# Patient Record
Sex: FEMALE | Race: WHITE | Employment: FULL TIME | URBAN - METROPOLITAN AREA
[De-identification: names, ages, dates, MRNs, and addresses within clinical notes are randomized per-mention and may not be internally consistent; named-entity substitution may affect disease eponyms.]

---

## 2017-03-12 PROBLEM — K21.9 GASTROESOPHAGEAL REFLUX DISEASE WITHOUT ESOPHAGITIS: Status: ACTIVE | Noted: 2017-03-12

## 2018-03-27 ENCOUNTER — HOSPITAL ENCOUNTER (OUTPATIENT)
Age: 63
Discharge: HOME | End: 2018-03-27
Payer: COMMERCIAL

## 2018-03-27 LAB
ALBUMIN SERPL-MCNC: 4.4 G/DL (ref 3.5–5.2)
ALP BLD-CCNC: 65 U/L (ref 35–104)
ALT SERPL-CCNC: 19 U/L (ref 0–32)
ANION GAP SERPL CALCULATED.3IONS-SCNC: 14 MMOL/L (ref 7–16)
AST SERPL-CCNC: 22 U/L (ref 0–31)
BASOPHILS ABSOLUTE: 0.04 E9/L (ref 0–0.2)
BASOPHILS RELATIVE PERCENT: 0.8 % (ref 0–2)
BILIRUB SERPL-MCNC: 1.1 MG/DL (ref 0–1.2)
BUN BLDV-MCNC: 21 MG/DL (ref 8–23)
CALCIUM SERPL-MCNC: 10.3 MG/DL (ref 8.6–10.2)
CHLORIDE BLD-SCNC: 102 MMOL/L (ref 98–107)
CHOLESTEROL, TOTAL: 310 MG/DL (ref 0–199)
CO2: 26 MMOL/L (ref 22–29)
CREAT SERPL-MCNC: 0.9 MG/DL (ref 0.5–1)
EOSINOPHILS ABSOLUTE: 0.12 E9/L (ref 0.05–0.5)
EOSINOPHILS RELATIVE PERCENT: 2.5 % (ref 0–6)
GFR AFRICAN AMERICAN: >60
GFR NON-AFRICAN AMERICAN: >60 ML/MIN/1.73
GLUCOSE BLD-MCNC: 161 MG/DL (ref 74–109)
HBA1C MFR BLD: 7.6 % (ref 4.8–5.9)
HCT VFR BLD CALC: 49.5 % (ref 34–48)
HDLC SERPL-MCNC: 37 MG/DL
HEMOGLOBIN: 16 G/DL (ref 11.5–15.5)
IMMATURE GRANULOCYTES #: 0.01 E9/L
IMMATURE GRANULOCYTES %: 0.2 % (ref 0–5)
LDL CHOLESTEROL CALCULATED: 214 MG/DL (ref 0–99)
LYMPHOCYTES ABSOLUTE: 2.12 E9/L (ref 1.5–4)
LYMPHOCYTES RELATIVE PERCENT: 44 % (ref 20–42)
MCH RBC QN AUTO: 26.6 PG (ref 26–35)
MCHC RBC AUTO-ENTMCNC: 32.3 % (ref 32–34.5)
MCV RBC AUTO: 82.2 FL (ref 80–99.9)
MONOCYTES ABSOLUTE: 0.41 E9/L (ref 0.1–0.95)
MONOCYTES RELATIVE PERCENT: 8.5 % (ref 2–12)
NEUTROPHILS ABSOLUTE: 2.12 E9/L (ref 1.8–7.3)
NEUTROPHILS RELATIVE PERCENT: 44 % (ref 43–80)
PDW BLD-RTO: 14.6 FL (ref 11.5–15)
PLATELET # BLD: 276 E9/L (ref 130–450)
PMV BLD AUTO: 11 FL (ref 7–12)
POTASSIUM SERPL-SCNC: 4.9 MMOL/L (ref 3.5–5)
RBC # BLD: 6.02 E12/L (ref 3.5–5.5)
SODIUM BLD-SCNC: 142 MMOL/L (ref 132–146)
TOTAL PROTEIN: 7.8 G/DL (ref 6.4–8.3)
TRIGL SERPL-MCNC: 296 MG/DL (ref 0–149)
TSH SERPL DL<=0.05 MIU/L-ACNC: 3.86 UIU/ML (ref 0.27–4.2)
VLDLC SERPL CALC-MCNC: 59 MG/DL
WBC # BLD: 4.8 E9/L (ref 4.5–11.5)

## 2018-03-27 PROCEDURE — 80061 LIPID PANEL: CPT

## 2018-03-27 PROCEDURE — 83036 HEMOGLOBIN GLYCOSYLATED A1C: CPT

## 2018-03-27 PROCEDURE — 80053 COMPREHEN METABOLIC PANEL: CPT

## 2018-03-27 PROCEDURE — 36415 COLL VENOUS BLD VENIPUNCTURE: CPT

## 2018-03-27 PROCEDURE — 84443 ASSAY THYROID STIM HORMONE: CPT

## 2018-03-27 PROCEDURE — 85025 COMPLETE CBC W/AUTO DIFF WBC: CPT

## 2018-03-28 ENCOUNTER — OFFICE VISIT (OUTPATIENT)
Dept: FAMILY MEDICINE CLINIC | Age: 63
End: 2018-03-28
Payer: COMMERCIAL

## 2018-03-28 VITALS
WEIGHT: 183 LBS | HEART RATE: 73 BPM | BODY MASS INDEX: 30.45 KG/M2 | SYSTOLIC BLOOD PRESSURE: 118 MMHG | DIASTOLIC BLOOD PRESSURE: 72 MMHG | OXYGEN SATURATION: 96 % | RESPIRATION RATE: 15 BRPM

## 2018-03-28 DIAGNOSIS — M19.90 ARTHRITIS: ICD-10-CM

## 2018-03-28 DIAGNOSIS — M25.511 ACUTE PAIN OF RIGHT SHOULDER: ICD-10-CM

## 2018-03-28 DIAGNOSIS — G47.9 SLEEP DISORDER: ICD-10-CM

## 2018-03-28 DIAGNOSIS — E11.9 TYPE 2 DIABETES MELLITUS WITHOUT COMPLICATION, WITHOUT LONG-TERM CURRENT USE OF INSULIN (HCC): Primary | ICD-10-CM

## 2018-03-28 DIAGNOSIS — F51.01 PRIMARY INSOMNIA: ICD-10-CM

## 2018-03-28 PROCEDURE — 99214 OFFICE O/P EST MOD 30 MIN: CPT | Performed by: FAMILY MEDICINE

## 2018-03-30 RX ORDER — CETIRIZINE HYDROCHLORIDE 10 MG/1
10 TABLET ORAL DAILY
Qty: 30 TABLET | Refills: 3 | Status: SHIPPED | OUTPATIENT
Start: 2018-03-30 | End: 2018-08-14 | Stop reason: ALTCHOICE

## 2018-03-30 RX ORDER — FLUTICASONE PROPIONATE 50 MCG
1 SPRAY, SUSPENSION (ML) NASAL 2 TIMES DAILY
Qty: 1 BOTTLE | Refills: 3 | Status: SHIPPED | OUTPATIENT
Start: 2018-03-30 | End: 2018-08-14 | Stop reason: ALTCHOICE

## 2018-03-30 RX ORDER — ZOLPIDEM TARTRATE 12.5 MG/1
12.5 TABLET, FILM COATED, EXTENDED RELEASE ORAL NIGHTLY PRN
Qty: 30 TABLET | Refills: 5 | Status: SHIPPED | OUTPATIENT
Start: 2018-03-30 | End: 2018-04-29

## 2018-03-30 RX ORDER — MELOXICAM 7.5 MG/1
7.5 TABLET ORAL DAILY PRN
Qty: 30 TABLET | Refills: 5 | Status: SHIPPED | OUTPATIENT
Start: 2018-03-30 | End: 2018-07-30 | Stop reason: SDUPTHER

## 2018-04-06 ASSESSMENT — ENCOUNTER SYMPTOMS
COLOR CHANGE: 0
WHEEZING: 0
BLOOD IN STOOL: 0
VOMITING: 0
SHORTNESS OF BREATH: 0
NAUSEA: 0
EYE REDNESS: 0
CHEST TIGHTNESS: 0
ABDOMINAL PAIN: 0
COUGH: 0
DIARRHEA: 0
CONSTIPATION: 0
BACK PAIN: 0

## 2018-05-01 ENCOUNTER — TELEPHONE (OUTPATIENT)
Dept: FAMILY MEDICINE CLINIC | Age: 63
End: 2018-05-01

## 2018-07-30 ENCOUNTER — OFFICE VISIT (OUTPATIENT)
Dept: FAMILY MEDICINE CLINIC | Age: 63
End: 2018-07-30
Payer: COMMERCIAL

## 2018-07-30 VITALS
HEIGHT: 65 IN | SYSTOLIC BLOOD PRESSURE: 112 MMHG | OXYGEN SATURATION: 98 % | HEART RATE: 72 BPM | RESPIRATION RATE: 14 BRPM | BODY MASS INDEX: 28.16 KG/M2 | DIASTOLIC BLOOD PRESSURE: 76 MMHG | WEIGHT: 169 LBS

## 2018-07-30 DIAGNOSIS — F51.01 PRIMARY INSOMNIA: ICD-10-CM

## 2018-07-30 DIAGNOSIS — M25.511 CHRONIC RIGHT SHOULDER PAIN: ICD-10-CM

## 2018-07-30 DIAGNOSIS — E11.9 TYPE 2 DIABETES MELLITUS WITHOUT COMPLICATION, WITHOUT LONG-TERM CURRENT USE OF INSULIN (HCC): Primary | ICD-10-CM

## 2018-07-30 DIAGNOSIS — K21.9 GASTROESOPHAGEAL REFLUX DISEASE WITHOUT ESOPHAGITIS: ICD-10-CM

## 2018-07-30 DIAGNOSIS — G89.29 CHRONIC RIGHT SHOULDER PAIN: ICD-10-CM

## 2018-07-30 DIAGNOSIS — Z12.11 SCREENING FOR COLON CANCER: ICD-10-CM

## 2018-07-30 LAB
CREATININE URINE POCT: 100
HBA1C MFR BLD: 7.3 %
MICROALBUMIN/CREAT 24H UR: 10 MG/G{CREAT}
MICROALBUMIN/CREAT UR-RTO: <30

## 2018-07-30 PROCEDURE — 99214 OFFICE O/P EST MOD 30 MIN: CPT | Performed by: FAMILY MEDICINE

## 2018-07-30 PROCEDURE — 82044 UR ALBUMIN SEMIQUANTITATIVE: CPT | Performed by: FAMILY MEDICINE

## 2018-07-30 PROCEDURE — 83036 HEMOGLOBIN GLYCOSYLATED A1C: CPT | Performed by: FAMILY MEDICINE

## 2018-07-30 RX ORDER — ZOLPIDEM TARTRATE 12.5 MG/1
TABLET, FILM COATED, EXTENDED RELEASE ORAL
Qty: 30 TABLET | Refills: 5 | Status: SHIPPED | OUTPATIENT
Start: 2018-07-30 | End: 2018-08-29

## 2018-07-30 RX ORDER — MELOXICAM 7.5 MG/1
7.5 TABLET ORAL DAILY PRN
Qty: 30 TABLET | Refills: 5 | Status: ON HOLD | OUTPATIENT
Start: 2018-07-30 | End: 2018-08-15 | Stop reason: HOSPADM

## 2018-07-30 RX ORDER — ZOLPIDEM TARTRATE 12.5 MG/1
TABLET, FILM COATED, EXTENDED RELEASE ORAL
Refills: 5 | COMMUNITY
Start: 2018-07-26 | End: 2018-07-30 | Stop reason: SDUPTHER

## 2018-07-30 ASSESSMENT — ENCOUNTER SYMPTOMS
VOMITING: 0
DIARRHEA: 0
ABDOMINAL PAIN: 0
NAUSEA: 0
EYE REDNESS: 0
COLOR CHANGE: 0
BLOOD IN STOOL: 0
WHEEZING: 0
SHORTNESS OF BREATH: 0
COUGH: 0
CONSTIPATION: 0
BACK PAIN: 0
CHEST TIGHTNESS: 0

## 2018-07-30 ASSESSMENT — PATIENT HEALTH QUESTIONNAIRE - PHQ9
1. LITTLE INTEREST OR PLEASURE IN DOING THINGS: 0
2. FEELING DOWN, DEPRESSED OR HOPELESS: 0
SUM OF ALL RESPONSES TO PHQ QUESTIONS 1-9: 0
SUM OF ALL RESPONSES TO PHQ9 QUESTIONS 1 & 2: 0

## 2018-08-14 ENCOUNTER — HOSPITAL ENCOUNTER (OUTPATIENT)
Age: 63
Discharge: HOME | End: 2018-08-14
Payer: COMMERCIAL

## 2018-08-14 ENCOUNTER — HOSPITAL ENCOUNTER (OUTPATIENT)
Age: 63
Setting detail: OBSERVATION
LOS: 1 days | Discharge: HOME | End: 2018-08-15
Attending: EMERGENCY MEDICINE | Admitting: FAMILY MEDICINE
Payer: COMMERCIAL

## 2018-08-14 ENCOUNTER — TELEPHONE (OUTPATIENT)
Dept: FAMILY MEDICINE CLINIC | Age: 63
End: 2018-08-14

## 2018-08-14 ENCOUNTER — APPOINTMENT (OUTPATIENT)
Dept: GENERAL RADIOLOGY | Age: 63
End: 2018-08-14
Payer: COMMERCIAL

## 2018-08-14 DIAGNOSIS — R07.9 CHEST PAIN, UNSPECIFIED TYPE: Primary | ICD-10-CM

## 2018-08-14 DIAGNOSIS — Z12.11 SCREENING FOR COLON CANCER: ICD-10-CM

## 2018-08-14 LAB
ALBUMIN SERPL-MCNC: 4.1 G/DL (ref 3.5–5.2)
ALP BLD-CCNC: 33 U/L (ref 35–104)
ALT SERPL-CCNC: 18 U/L (ref 0–32)
ANION GAP SERPL CALCULATED.3IONS-SCNC: 6 MMOL/L (ref 7–16)
AST SERPL-CCNC: 16 U/L (ref 0–31)
BILIRUB SERPL-MCNC: 1.1 MG/DL (ref 0–1.2)
BUN BLDV-MCNC: 19 MG/DL (ref 8–23)
CALCIUM SERPL-MCNC: 10.1 MG/DL (ref 8.6–10.2)
CHLORIDE BLD-SCNC: 99 MMOL/L (ref 98–107)
CK MB: 5 NG/ML (ref 0–4.3)
CO2: 26 MMOL/L (ref 22–29)
CONTROL: POSITIVE
CREAT SERPL-MCNC: 0.7 MG/DL (ref 0.5–1)
D DIMER: 214 NG/ML DDU
GFR AFRICAN AMERICAN: >60
GFR NON-AFRICAN AMERICAN: >60 ML/MIN/1.73
GLUCOSE BLD-MCNC: 179 MG/DL (ref 74–109)
HCT VFR BLD CALC: 47.9 % (ref 34–48)
HEMOCCULT STL QL: NEGATIVE
HEMOGLOBIN: 15.6 G/DL (ref 11.5–15.5)
MCH RBC QN AUTO: 26.9 PG (ref 26–35)
MCHC RBC AUTO-ENTMCNC: 32.6 % (ref 32–34.5)
MCV RBC AUTO: 82.6 FL (ref 80–99.9)
PDW BLD-RTO: 14.6 FL (ref 11.5–15)
PLATELET # BLD: 239 E9/L (ref 130–450)
PMV BLD AUTO: 11.5 FL (ref 7–12)
POTASSIUM SERPL-SCNC: 4.8 MMOL/L (ref 3.5–5)
RBC # BLD: 5.8 E12/L (ref 3.5–5.5)
SODIUM BLD-SCNC: 131 MMOL/L (ref 132–146)
TOTAL PROTEIN: 8.1 G/DL (ref 6.4–8.3)
TROPONIN: <0.01 NG/ML (ref 0–0.03)
WBC # BLD: 4.7 E9/L (ref 4.5–11.5)

## 2018-08-14 PROCEDURE — 80053 COMPREHEN METABOLIC PANEL: CPT

## 2018-08-14 PROCEDURE — G0378 HOSPITAL OBSERVATION PER HR: HCPCS

## 2018-08-14 PROCEDURE — 82274 ASSAY TEST FOR BLOOD FECAL: CPT | Performed by: FAMILY MEDICINE

## 2018-08-14 PROCEDURE — 6370000000 HC RX 637 (ALT 250 FOR IP): Performed by: STUDENT IN AN ORGANIZED HEALTH CARE EDUCATION/TRAINING PROGRAM

## 2018-08-14 PROCEDURE — 36415 COLL VENOUS BLD VENIPUNCTURE: CPT

## 2018-08-14 PROCEDURE — 85378 FIBRIN DEGRADE SEMIQUANT: CPT

## 2018-08-14 PROCEDURE — 71046 X-RAY EXAM CHEST 2 VIEWS: CPT

## 2018-08-14 PROCEDURE — 93005 ELECTROCARDIOGRAM TRACING: CPT | Performed by: EMERGENCY MEDICINE

## 2018-08-14 PROCEDURE — A0426 ALS 1: HCPCS

## 2018-08-14 PROCEDURE — 84484 ASSAY OF TROPONIN QUANT: CPT

## 2018-08-14 PROCEDURE — A0425 GROUND MILEAGE: HCPCS

## 2018-08-14 PROCEDURE — 99285 EMERGENCY DEPT VISIT HI MDM: CPT

## 2018-08-14 PROCEDURE — 85027 COMPLETE CBC AUTOMATED: CPT

## 2018-08-14 PROCEDURE — 82553 CREATINE MB FRACTION: CPT

## 2018-08-14 PROCEDURE — 2580000003 HC RX 258: Performed by: STUDENT IN AN ORGANIZED HEALTH CARE EDUCATION/TRAINING PROGRAM

## 2018-08-14 RX ORDER — ASPIRIN 81 MG/1
TABLET, CHEWABLE ORAL
Status: DISPENSED
Start: 2018-08-14 | End: 2018-08-14

## 2018-08-14 RX ORDER — MELOXICAM 7.5 MG/1
7.5 TABLET ORAL DAILY
Status: DISCONTINUED | OUTPATIENT
Start: 2018-08-14 | End: 2018-08-15 | Stop reason: HOSPADM

## 2018-08-14 RX ORDER — ONDANSETRON 2 MG/ML
4 INJECTION INTRAMUSCULAR; INTRAVENOUS EVERY 6 HOURS PRN
Status: DISCONTINUED | OUTPATIENT
Start: 2018-08-14 | End: 2018-08-15 | Stop reason: HOSPADM

## 2018-08-14 RX ORDER — LORAZEPAM 0.5 MG/1
0.5 TABLET ORAL NIGHTLY
Status: DISCONTINUED | OUTPATIENT
Start: 2018-08-14 | End: 2018-08-15 | Stop reason: HOSPADM

## 2018-08-14 RX ORDER — ASPIRIN 81 MG/1
243 TABLET, CHEWABLE ORAL ONCE
Status: COMPLETED | OUTPATIENT
Start: 2018-08-14 | End: 2018-08-14

## 2018-08-14 RX ORDER — NITROGLYCERIN 0.4 MG/1
0.4 TABLET SUBLINGUAL ONCE
Status: COMPLETED | OUTPATIENT
Start: 2018-08-14 | End: 2018-08-14

## 2018-08-14 RX ORDER — NITROGLYCERIN 0.4 MG/1
TABLET SUBLINGUAL
Status: DISPENSED
Start: 2018-08-14 | End: 2018-08-14

## 2018-08-14 RX ORDER — ASPIRIN 81 MG/1
81 TABLET ORAL DAILY
Status: DISCONTINUED | OUTPATIENT
Start: 2018-08-14 | End: 2018-08-15 | Stop reason: HOSPADM

## 2018-08-14 RX ORDER — SODIUM CHLORIDE 0.9 % (FLUSH) 0.9 %
10 SYRINGE (ML) INJECTION EVERY 12 HOURS SCHEDULED
Status: DISCONTINUED | OUTPATIENT
Start: 2018-08-14 | End: 2018-08-15 | Stop reason: HOSPADM

## 2018-08-14 RX ORDER — SODIUM CHLORIDE 0.9 % (FLUSH) 0.9 %
10 SYRINGE (ML) INJECTION PRN
Status: DISCONTINUED | OUTPATIENT
Start: 2018-08-14 | End: 2018-08-15 | Stop reason: HOSPADM

## 2018-08-14 RX ADMIN — LORAZEPAM 0.5 MG: 0.5 TABLET ORAL at 22:01

## 2018-08-14 RX ADMIN — NITROGLYCERIN 0.4 MG: 0.4 TABLET SUBLINGUAL at 14:23

## 2018-08-14 RX ADMIN — Medication 10 ML: at 22:02

## 2018-08-14 RX ADMIN — ASPIRIN 81 MG 243 MG: 81 TABLET ORAL at 14:23

## 2018-08-14 ASSESSMENT — PAIN DESCRIPTION - DESCRIPTORS
DESCRIPTORS: SQUEEZING
DESCRIPTORS: ACHING;SHOOTING;SHARP

## 2018-08-14 ASSESSMENT — PAIN DESCRIPTION - ORIENTATION
ORIENTATION: MID
ORIENTATION: LEFT;MID

## 2018-08-14 ASSESSMENT — PAIN DESCRIPTION - PROGRESSION: CLINICAL_PROGRESSION: GRADUALLY WORSENING

## 2018-08-14 ASSESSMENT — PAIN DESCRIPTION - PAIN TYPE: TYPE: ACUTE PAIN

## 2018-08-14 ASSESSMENT — PAIN SCALES - GENERAL
PAINLEVEL_OUTOF10: 6
PAINLEVEL_OUTOF10: 6

## 2018-08-14 ASSESSMENT — PAIN DESCRIPTION - DIRECTION: RADIATING_TOWARDS: BACK

## 2018-08-14 ASSESSMENT — PAIN DESCRIPTION - FREQUENCY: FREQUENCY: CONTINUOUS

## 2018-08-14 ASSESSMENT — PAIN DESCRIPTION - LOCATION
LOCATION: CHEST
LOCATION: CHEST

## 2018-08-14 ASSESSMENT — PAIN DESCRIPTION - ONSET: ONSET: ON-GOING

## 2018-08-14 NOTE — H&P
[address] [facility]  Department of Family Medicine  Family Medicine Residency Program  History and Physical    Patient:  Jose Daniel Milner 63 y.o. female MRN: [id number]     Date of Service: 8/14/2018      Chief complaint: chest pain non exertional    History of Present Illness   The patient is a 63 y.o. female with past medical history of type 2 Diabetes, GERD and primary insomnia presented with sudden onset pressure like chest pain of 8/10 intensity at onset, at admission it is of 4/10 in intensity, located in middle of chest and epigastrium radiating towards left side of chest it started today around 6 am. Nothing relieves or aggregates the pain. Patient took one tab baby aspirin at home. She denies any racing of heart, shortness of breath or diaphoresis. She also denies similar complaints in the past. She states her nose is stuffy since 3 days, denies any discharge from eye or nose or any sinus pain. She states she has a right shoulder pain when she moves it, she has it since many months and is taking meloxicam for it. She also has a history of pulmonary embolus after lumbar laminectomy surgery in 2003, was on warfarin. She denies any pain anywhere else in the body. Past Medical History:       Diagnosis Date    Embolism ([address] 2003    pulmonary post op    SHANTANU (obstructive sleep apnea)     Unable to tolerate CPAP    Type 2 diabetes mellitus without complication ([facility] [address] 8/35/8797       Past Surgical History:        Procedure Laterality Date    BACK SURGERY  2003    Lumbar, laminectomy, Dr. Richie Hurst  2002    Adhesions, menorrhagia    INGUINAL HERNIA REPAIR  1961, 1962    NOSE SURGERY  2008    Nasal ablation    TONSILLECTOMY  1975       Medications Prior to Admission:    Prior to Admission medications    Medication Sig Start Date End Date Taking? Authorizing Provider   zolpidem (AMBIEN CR) 12.5 MG extended release tablet TK 1 T PO Q NIGHT PRF SLP. Pulse 75   Temp 97.7 °F (36.5 °C) (Temporal)   Resp 18   Ht 5' 5\" (1.651 m)   Wt 177 lb (80.3 kg)   SpO2 97%   BMI 29.45 kg/m²   · General Appearance: Alert, cooperative, no acute distress. · HEENT: Normocephalic, atraumatic. ORTIZ, conjunctiva/corneas clear, no pallor  or icterus. · Neck: Supple, symmetrical, trachea midline, no cervical LAD. No thyroid enlargement/tenderness/nodules. No carotid bruit or JVD  · Chest wall: No tenderness or deformity, full & symmetric excursion  · Lung: Clear to auscultation bilaterally,  respirations unlabored. No rales/wheezing/rubs  · Heart: Regular rate and rhythm, S1 and S2 normal, no murmur, rub or   gallop. no bruits (carotid/femoral/abd), pedal pulses 2+,  no edema  · Abdomen: Soft, non-tender, bowel sounds active all four quadrants, no masses, no organomegaly  · Genital and rectal exam: deferred  · Extremities:  Extremities normal, atraumatic, no cyanosis or edema. Pulses equal bilaterally  · Skin: Skin color, texture, turgor normal, no rashes or lesions  · Musculokeletal: No joint swelling, no muscle tenderness. ROM normal in all joints of extremities. · Lymph nodes: no lymph node enlargement apprciated  · Neurologic:   Alert&Oriented. CNII-XII intact. Normal and symmetric  strength in UEs and LEs,   DTRs 2+ and symmetric, Babinski sign is absent (flexor)  normal gait, coordination with finger to nose, heel to shin and repetitive movement is intact.  Romberg negative  sensation intact     Labs and Imaging Studies   Basic Labs  Results for orders placed or performed during the hospital encounter of 08/14/18   CBC   Result Value Ref Range    WBC 4.7 4.5 - 11.5 E9/L    RBC 5.80 (H) 3.50 - 5.50 E12/L    Hemoglobin 15.6 (H) 11.5 - 15.5 g/dL    Hematocrit 47.9 34.0 - 48.0 %    MCV 82.6 80.0 - 99.9 fL    MCH 26.9 26.0 - 35.0 pg    MCHC 32.6 32.0 - 34.5 %    RDW 14.6 11.5 - 15.0 fL    Platelets 286 681 - 061 E9/L    MPV 11.5 7.0 - 12.0 fL   Comprehensive Metabolic

## 2018-08-14 NOTE — PROGRESS NOTES
Sarina Wilkes was ordered jardiance 25 which is a nonformulary medication. The patient has indicated that the home supply of this medication will be brought in to the hospital for inpatient use. If the medication has not been administered by 1400 on the following day from the time the order was placed, a pharmacist will follow-up with the nurse of the patient to assess the capability of the patient to bring in the medication. If it is determined that the patient cannot supply the medication and it is not available to be dispensed from the pharmacy, a call will be placed to the ordering provider to discuss alternative options.        Vandana Mullins, PharmD 8/14/2018 4:28 PM

## 2018-08-14 NOTE — TELEPHONE ENCOUNTER
Shelton Jenkins [address] phoned for Dr Panfilo Watkins, Pato Ackerman would like to speak with you in regards to Hamzah who is currently at [address] [facility] please phone, [phone]

## 2018-08-14 NOTE — ED TRIAGE NOTES
Patient states she woke up this am with midsternal pain that feels like it goes straight thru to back, patient states she has had a hard time getting  Comfortable ,

## 2018-08-15 ENCOUNTER — APPOINTMENT (OUTPATIENT)
Dept: NUCLEAR MEDICINE | Age: 63
End: 2018-08-15
Payer: COMMERCIAL

## 2018-08-15 VITALS
SYSTOLIC BLOOD PRESSURE: 130 MMHG | DIASTOLIC BLOOD PRESSURE: 80 MMHG | HEIGHT: 65 IN | HEART RATE: 70 BPM | BODY MASS INDEX: 29.49 KG/M2 | OXYGEN SATURATION: 96 % | RESPIRATION RATE: 18 BRPM | TEMPERATURE: 97.5 F | WEIGHT: 177 LBS

## 2018-08-15 PROBLEM — R07.9 CHEST PAIN: Status: RESOLVED | Noted: 2018-08-14 | Resolved: 2018-08-15

## 2018-08-15 LAB
ALBUMIN SERPL-MCNC: 4 G/DL (ref 3.5–5.2)
ALP BLD-CCNC: 65 U/L (ref 35–104)
ALT SERPL-CCNC: 13 U/L (ref 0–32)
ANION GAP SERPL CALCULATED.3IONS-SCNC: 20 MMOL/L (ref 7–16)
AST SERPL-CCNC: 18 U/L (ref 0–31)
BASOPHILS ABSOLUTE: 0.06 E9/L (ref 0–0.2)
BASOPHILS RELATIVE PERCENT: 1.2 % (ref 0–2)
BILIRUB SERPL-MCNC: 1.4 MG/DL (ref 0–1.2)
BUN BLDV-MCNC: 20 MG/DL (ref 8–23)
CALCIUM SERPL-MCNC: 9.5 MG/DL (ref 8.6–10.2)
CHLORIDE BLD-SCNC: 97 MMOL/L (ref 98–107)
CO2: 21 MMOL/L (ref 22–29)
CREAT SERPL-MCNC: 0.8 MG/DL (ref 0.5–1)
EOSINOPHILS ABSOLUTE: 0.11 E9/L (ref 0.05–0.5)
EOSINOPHILS RELATIVE PERCENT: 2.1 % (ref 0–6)
GFR AFRICAN AMERICAN: >60
GFR NON-AFRICAN AMERICAN: >60 ML/MIN/1.73
GLUCOSE BLD-MCNC: 119 MG/DL (ref 74–109)
HCT VFR BLD CALC: 46.6 % (ref 34–48)
HEMOGLOBIN: 15.9 G/DL (ref 11.5–15.5)
IMMATURE GRANULOCYTES #: 0.01 E9/L
IMMATURE GRANULOCYTES %: 0.2 % (ref 0–5)
LV EF: 73 %
LVEF MODALITY: NORMAL
LYMPHOCYTES ABSOLUTE: 2.31 E9/L (ref 1.5–4)
LYMPHOCYTES RELATIVE PERCENT: 44.7 % (ref 20–42)
MCH RBC QN AUTO: 27.3 PG (ref 26–35)
MCHC RBC AUTO-ENTMCNC: 34.1 % (ref 32–34.5)
MCV RBC AUTO: 80.1 FL (ref 80–99.9)
MONOCYTES ABSOLUTE: 0.52 E9/L (ref 0.1–0.95)
MONOCYTES RELATIVE PERCENT: 10.1 % (ref 2–12)
NEUTROPHILS ABSOLUTE: 2.16 E9/L (ref 1.8–7.3)
NEUTROPHILS RELATIVE PERCENT: 41.7 % (ref 43–80)
PDW BLD-RTO: 14.2 FL (ref 11.5–15)
PLATELET # BLD: 260 E9/L (ref 130–450)
PMV BLD AUTO: 10.9 FL (ref 7–12)
POTASSIUM REFLEX MAGNESIUM: 4 MMOL/L (ref 3.5–5)
RBC # BLD: 5.82 E12/L (ref 3.5–5.5)
SODIUM BLD-SCNC: 138 MMOL/L (ref 132–146)
TOTAL PROTEIN: 7.7 G/DL (ref 6.4–8.3)
WBC # BLD: 5.2 E9/L (ref 4.5–11.5)

## 2018-08-15 PROCEDURE — G0378 HOSPITAL OBSERVATION PER HR: HCPCS

## 2018-08-15 PROCEDURE — 78452 HT MUSCLE IMAGE SPECT MULT: CPT

## 2018-08-15 PROCEDURE — 93005 ELECTROCARDIOGRAM TRACING: CPT | Performed by: STUDENT IN AN ORGANIZED HEALTH CARE EDUCATION/TRAINING PROGRAM

## 2018-08-15 PROCEDURE — 3430000000 HC RX DIAGNOSTIC RADIOPHARMACEUTICAL: Performed by: RADIOLOGY

## 2018-08-15 PROCEDURE — A9500 TC99M SESTAMIBI: HCPCS | Performed by: RADIOLOGY

## 2018-08-15 PROCEDURE — 80053 COMPREHEN METABOLIC PANEL: CPT

## 2018-08-15 PROCEDURE — 2580000003 HC RX 258: Performed by: STUDENT IN AN ORGANIZED HEALTH CARE EDUCATION/TRAINING PROGRAM

## 2018-08-15 PROCEDURE — 85025 COMPLETE CBC W/AUTO DIFF WBC: CPT

## 2018-08-15 PROCEDURE — 99219 PR INITIAL OBSERVATION CARE/DAY 50 MINUTES: CPT | Performed by: STUDENT IN AN ORGANIZED HEALTH CARE EDUCATION/TRAINING PROGRAM

## 2018-08-15 PROCEDURE — 36415 COLL VENOUS BLD VENIPUNCTURE: CPT

## 2018-08-15 PROCEDURE — 93017 CV STRESS TEST TRACING ONLY: CPT

## 2018-08-15 RX ADMIN — Medication 10 MILLICURIE: at 12:30

## 2018-08-15 RX ADMIN — Medication 32 MILLICURIE: at 14:27

## 2018-08-15 RX ADMIN — Medication 10 ML: at 10:00

## 2018-08-15 ASSESSMENT — PAIN SCALES - GENERAL: PAINLEVEL_OUTOF10: 0

## 2018-08-15 NOTE — DISCHARGE SUMMARY
as needed for Pain     zolpidem 12.5 MG extended release tablet  Commonly known as:  AMBIEN CR  TK 1 T PO Q NIGHT PRF SLP. Consults:  none    Significant Diagnostic Studies:    Labs:  Na/K/Cl/CO2:  138/4.0/97/21 (08/15 0805)  BUN/Cr/glu/ALT/AST/amyl/lip:  20/0.8/--/13/18/--/-- (08/15 0805)  WBC/Hgb/Hct/Plts:  5.2/15.9/46.6/260 (08/15 0805)  [unfilled]  estimated creatinine clearance is 78 mL/min (based on SCr of 0.8 mg/dL). New Imaging:  Xr Chest Standard (2 Vw)    Result Date: 2018  Patient MRN:  [id number] : 1955 Age: 63 years Gender: Female Order Date:  2018 7:45 AM EXAM: XR CHEST (2 VW) NUMBER OF IMAGES:  2 INDICATION: Chest pain Technique: PA and lateral views of the chest obtained on 2018 7:45 AM Comparison:  Comparison is made to PA and lateral views the chest dated 2017. Findings: The cardiomediastinal silhouette is within normal limits. The hilar and mediastinal contours are normal. The lungs are without focal consolidation or pleural effusion. There is no pneumothorax. The visualized osseous structures demonstrate degenerative changes. Lines and Tubes: None. No focal consolidation. Treatments:   Aspirin, Nitroglycerin. Discharge Exam:  Physical Exam:  · Vitals: /88   Pulse 80   Temp 98 °F (36.7 °C)   Resp 16   Ht 5' 5\" (1.651 m)   Wt 177 lb (80.3 kg)   SpO2 97%   BMI 29.45 kg/m²     · General Appearance: AAOX3  · HEENT:  NC/AT. · Neck: Trachea midline. No thyromegaly. No LAD. · Lung: Clear breath sounds B/l  · Heart: RRR, normal S1, S2. Late systolic murmur. · Abdomen: Soft, NT, ND. BS +rafia. · Extremities: No leg edema. Pedal pulses 2+ symmetric b/l. · Musculokeletal: No joint swelling, no muscle tenderness. ROM normal in all joints of extremities.    · Neurologic: Mental status: AAOX3     Disposition:   home    Future Appointments  Date Time Provider Janice Subramanian   8/15/2018 1:30 PM [address] [facility] [facility]

## 2018-08-15 NOTE — PROGRESS NOTES
[facility], Family Medicine Residency Program  Resident Progress  Note      Patient:  Toby Bello 63 y.o. female MRN: [id number]     Date of Service: 8/15/2018      [facility] day 1  ACS Rule out admission  Cyclic troponin -ve, admission EKG normal, Stress test scheduled for today noon. Subjective      Patient was seen and examined this am. She denies any chest pain. She denies any kind of complaints. Objective     Physical Exam:  · Vitals: /88   Pulse 80   Temp 98 °F (36.7 °C)   Resp 16   Ht 5' 5\" (1.651 m)   Wt 177 lb (80.3 kg)   SpO2 97%   BMI 29.45 kg/m²     · General Appearance: AAOX3  · HEENT:  NC/AT. · Neck: Trachea midline. No thyromegaly. No LAD. · Lung: Clear breath sounds B/l  · Heart: RRR, normal S1, S2. Late systolic murmur. · Abdomen: Soft, NT, ND. BS +rafia. · Extremities: No leg edema. Pedal pulses 2+ symmetric b/l. · Musculokeletal: No joint swelling, no muscle tenderness. ROM normal in all joints of extremities.    · Neurologic: Mental status: AAOX3     Pertinent/ New Labs and Imaging Studies     CBC:   Lab Results   Component Value Date    WBC 4.7 08/14/2018    RBC 5.80 08/14/2018    HGB 15.6 08/14/2018    HCT 47.9 08/14/2018     08/14/2018    MCV 82.6 08/14/2018     BMP:    Lab Results   Component Value Date     08/14/2018    K 4.8 08/14/2018    CL 99 08/14/2018    CO2 26 08/14/2018    BUN 19 08/14/2018    CREATININE 0.7 08/14/2018    GLUCOSE 179 08/14/2018    GLUCOSE 235 10/01/2011    CALCIUM 10.1 08/14/2018       Resident's Assessment and PLan   ACS rule out  Symptoms, age and risk factors concerning for ACS  Troponin cyclic normal   Stress test today noon     Diabetes type 2  Continue home empagliflozin 25 mg one tablet daily(home medication)  Continue aspirin 81 mg daily  HbA1C - 7.6 march/2018     Shoulder pain  Since many months  Continue meloxicam      History of pulmonary embolism  Wells score at admission is 1.5  D-Dimer -214 ng/mL, Low probability of PE/DVT    DVT ppx - Enoxaparin  Suze Reynaga M.D., PGY1  Family Medicine   Attending physician: Dr. Ramon Victor

## 2018-08-15 NOTE — PROGRESS NOTES
Patient here for ACS rule out. Currently complains of no chest pain. Remitted at approximately 6pm on day of admission.        Chest Pain

## 2018-08-17 LAB
EKG ATRIAL RATE: 67 BPM
EKG ATRIAL RATE: 73 BPM
EKG P AXIS: -7 DEGREES
EKG P AXIS: 9 DEGREES
EKG P-R INTERVAL: 158 MS
EKG P-R INTERVAL: 162 MS
EKG Q-T INTERVAL: 406 MS
EKG Q-T INTERVAL: 410 MS
EKG QRS DURATION: 70 MS
EKG QRS DURATION: 74 MS
EKG QTC CALCULATION (BAZETT): 433 MS
EKG QTC CALCULATION (BAZETT): 447 MS
EKG R AXIS: -14 DEGREES
EKG R AXIS: 1 DEGREES
EKG T AXIS: 19 DEGREES
EKG T AXIS: 36 DEGREES
EKG VENTRICULAR RATE: 67 BPM
EKG VENTRICULAR RATE: 73 BPM

## 2018-08-17 PROCEDURE — 93010 ELECTROCARDIOGRAM REPORT: CPT | Performed by: FAMILY MEDICINE

## 2018-08-22 ENCOUNTER — OFFICE VISIT (OUTPATIENT)
Dept: FAMILY MEDICINE CLINIC | Age: 63
End: 2018-08-22
Payer: COMMERCIAL

## 2018-08-22 VITALS
HEART RATE: 79 BPM | HEIGHT: 65 IN | BODY MASS INDEX: 23.82 KG/M2 | WEIGHT: 143 LBS | DIASTOLIC BLOOD PRESSURE: 82 MMHG | RESPIRATION RATE: 20 BRPM | SYSTOLIC BLOOD PRESSURE: 124 MMHG | TEMPERATURE: 97.5 F

## 2018-08-22 DIAGNOSIS — R07.89 OTHER CHEST PAIN: Primary | ICD-10-CM

## 2018-08-22 DIAGNOSIS — K21.9 GASTROESOPHAGEAL REFLUX DISEASE WITHOUT ESOPHAGITIS: ICD-10-CM

## 2018-08-22 DIAGNOSIS — E11.9 TYPE 2 DIABETES MELLITUS WITHOUT COMPLICATION, WITHOUT LONG-TERM CURRENT USE OF INSULIN (HCC): ICD-10-CM

## 2018-08-22 PROCEDURE — 99214 OFFICE O/P EST MOD 30 MIN: CPT | Performed by: FAMILY MEDICINE

## 2018-08-22 PROCEDURE — 1111F DSCHRG MED/CURRENT MED MERGE: CPT | Performed by: FAMILY MEDICINE

## 2018-08-22 NOTE — PROGRESS NOTES
Post-Discharge Transitional Care Management Services or Hospital Follow Up      Sigrid Nunn   YOB: 1955    Date of Office Visit:  2018  Date of Hospital Admission: 18  Date of Hospital Discharge: 8/15/18  Readmission Risk Score(high >=14%. Medium >=10%):Readmission Risk Score: 5    Care management risk score Rising risk (score 2-5) and Complex Care (Scores >=6): 2     Non face to face  following discharge, date last encounter closed (first attempt may have been earlier): *No documented post hospital discharge outreach found in the last 14 days *No documented post hospital discharge outreach found in the last 14 days    Call initiated 2 business days of discharge: *No response recorded in the last 14 days     Patient Active Problem List   Diagnosis    Type 2 diabetes mellitus without complication ([facility] [address]    Primary insomnia    Sleep disorder    Gastroesophageal reflux disease without esophagitis       Allergies   Allergen Reactions    Statins Other (See Comments)     Unable to tolerate GI symptoms  Lipitor and Crestor       Medications listed as ordered at the time of discharge from [facility] Medication Instructions MARYBETH:    Printed on:18 6520   Medication Information                      aspirin 81 MG tablet  Take 81 mg by mouth daily             empagliflozin (JARDIANCE) 25 MG tablet  Take 25 mg by mouth daily                   Medications marked \"taking\" at this time  Outpatient Prescriptions Marked as Taking for the 18 encounter (Office Visit) with Evert Baca MD   Medication Sig Dispense Refill    [] zolpidem (AMBIEN CR) 12.5 MG extended release tablet TK 1 T PO Q NIGHT PRF SLP.  30 tablet 5    empagliflozin (JARDIANCE) 25 MG tablet Take 25 mg by mouth daily 30 tablet 5    aspirin 81 MG tablet Take 81 mg by mouth daily          Medications patient taking as of now reconciled against medications ordered at time of hospital discharge: Yes    Chief Complaint   Patient presents with    Follow-Up from Hospital       HPI    Inpatient course: Discharge summary reviewed- see chart. Interval history/Current status: No further CP. Likely mobic per cardiology. BP has been OK, monitoring. Testing in hospital negative. Reviewed consult, testing, stress, labs, imaging. Not taking NSAIDs. Different from GERD pain. Review of Systems   Constitutional: Negative for chills, diaphoresis and fever. Eyes: Negative for redness and visual disturbance. Respiratory: Negative for cough, chest tightness, shortness of breath and wheezing. Cardiovascular: Negative for chest pain, palpitations and leg swelling. Gastrointestinal: Negative for abdominal pain, blood in stool, constipation, diarrhea, nausea and vomiting. Musculoskeletal: Negative for arthralgias, back pain and myalgias. Skin: Negative for color change, pallor and rash. Neurological: Negative for dizziness, syncope, light-headedness, numbness and headaches. Psychiatric/Behavioral: Negative for confusion and dysphoric mood. The patient is not nervous/anxious. All other systems reviewed and are negative. Vitals:    08/22/18 1545 08/22/18 1547 08/22/18 1612   BP: (!) 143/87 (!) 150/90 124/82   Pulse: 79     Resp: 20     Temp: 97.5 °F (36.4 °C)     TempSrc: Oral     Weight: 143 lb (64.9 kg)     Height: 5' 5\" (1.651 m)       Body mass index is 23.8 kg/m². Wt Readings from Last 3 Encounters:   08/22/18 143 lb (64.9 kg)   08/14/18 177 lb (80.3 kg)   07/30/18 169 lb (76.7 kg)     BP Readings from Last 3 Encounters:   08/22/18 124/82   08/15/18 130/80   07/30/18 112/76       Physical Exam   Constitutional: She is oriented to person, place, and time. She appears well-developed and well-nourished. No distress. Neck: Neck supple. No JVD present. No thyromegaly present. Cardiovascular: Normal rate and regular rhythm. Exam reveals no gallop and no friction rub.     No murmur heard.  Pulmonary/Chest: Effort normal and breath sounds normal. No respiratory distress. She has no wheezes. She has no rales. Abdominal: Soft. Bowel sounds are normal. She exhibits no distension. There is no tenderness. Musculoskeletal: She exhibits no edema or tenderness. Lymphadenopathy:     She has no cervical adenopathy. Neurological: She is alert and oriented to person, place, and time. Skin: Skin is warm and dry. No rash noted. Vitals reviewed. Assessment/Plan:  1. Other chest pain  No further, hold mobic  - PA DISCHARGE MEDS RECONCILED W/ CURRENT OUTPATIENT MED LIST    2. Type 2 diabetes mellitus without complication, without long-term current use of insulin (HCC)  OK control, continue same, reduce starch intake    3.  Gastroesophageal reflux disease without esophagitis  Controlled        Medical Decision Making: moderate complexity

## 2018-09-03 ASSESSMENT — ENCOUNTER SYMPTOMS
VOMITING: 0
COLOR CHANGE: 0
EYE REDNESS: 0
NAUSEA: 0
COUGH: 0
DIARRHEA: 0
CONSTIPATION: 0
ABDOMINAL PAIN: 0
BLOOD IN STOOL: 0
BACK PAIN: 0
SHORTNESS OF BREATH: 0
WHEEZING: 0
CHEST TIGHTNESS: 0

## 2018-09-30 ENCOUNTER — PATIENT MESSAGE (OUTPATIENT)
Dept: FAMILY MEDICINE CLINIC | Age: 63
End: 2018-09-30

## 2018-09-30 DIAGNOSIS — F51.01 PRIMARY INSOMNIA: Primary | ICD-10-CM

## 2018-10-02 RX ORDER — ZOLPIDEM TARTRATE 12.5 MG/1
12.5 TABLET, FILM COATED, EXTENDED RELEASE ORAL NIGHTLY PRN
Qty: 30 TABLET | Refills: 2 | Status: SHIPPED | OUTPATIENT
Start: 2018-10-02 | End: 2018-11-01

## 2018-12-12 ENCOUNTER — HOSPITAL ENCOUNTER (EMERGENCY)
Age: 63
Discharge: HOME | End: 2018-12-12
Payer: COMMERCIAL

## 2018-12-12 ENCOUNTER — APPOINTMENT (OUTPATIENT)
Dept: GENERAL RADIOLOGY | Age: 63
End: 2018-12-12
Payer: COMMERCIAL

## 2018-12-12 VITALS
BODY MASS INDEX: 28.96 KG/M2 | WEIGHT: 174 LBS | SYSTOLIC BLOOD PRESSURE: 135 MMHG | OXYGEN SATURATION: 99 % | RESPIRATION RATE: 16 BRPM | TEMPERATURE: 98.2 F | DIASTOLIC BLOOD PRESSURE: 76 MMHG | HEART RATE: 78 BPM

## 2018-12-12 DIAGNOSIS — M54.5 LOW BACK PAIN, UNSPECIFIED BACK PAIN LATERALITY, UNSPECIFIED CHRONICITY, WITH SCIATICA PRESENCE UNSPECIFIED: Primary | ICD-10-CM

## 2018-12-12 PROCEDURE — 72170 X-RAY EXAM OF PELVIS: CPT

## 2018-12-12 PROCEDURE — 72110 X-RAY EXAM L-2 SPINE 4/>VWS: CPT

## 2018-12-12 PROCEDURE — 99212 OFFICE O/P EST SF 10 MIN: CPT

## 2018-12-12 RX ORDER — IBUPROFEN 800 MG/1
800 TABLET ORAL EVERY 8 HOURS PRN
Qty: 21 TABLET | Refills: 0 | Status: SHIPPED | OUTPATIENT
Start: 2018-12-12 | End: 2019-02-19 | Stop reason: ALTCHOICE

## 2018-12-12 RX ORDER — HYDROCODONE BITARTRATE AND ACETAMINOPHEN 5; 325 MG/1; MG/1
1 TABLET ORAL EVERY 6 HOURS PRN
Qty: 12 TABLET | Refills: 0 | Status: SHIPPED | OUTPATIENT
Start: 2018-12-12 | End: 2018-12-15

## 2018-12-12 RX ORDER — ZOLPIDEM TARTRATE 5 MG/1
5 TABLET ORAL NIGHTLY PRN
COMMUNITY
End: 2018-12-19 | Stop reason: SDUPTHER

## 2018-12-19 ENCOUNTER — OFFICE VISIT (OUTPATIENT)
Dept: FAMILY MEDICINE CLINIC | Age: 63
End: 2018-12-19
Payer: COMMERCIAL

## 2018-12-19 VITALS
WEIGHT: 177 LBS | HEART RATE: 73 BPM | TEMPERATURE: 98.2 F | RESPIRATION RATE: 16 BRPM | BODY MASS INDEX: 29.45 KG/M2 | OXYGEN SATURATION: 98 % | SYSTOLIC BLOOD PRESSURE: 122 MMHG | DIASTOLIC BLOOD PRESSURE: 76 MMHG

## 2018-12-19 DIAGNOSIS — R20.2 LEFT LEG PARESTHESIAS: ICD-10-CM

## 2018-12-19 DIAGNOSIS — R29.898 WEAKNESS OF LEFT LOWER EXTREMITY: Primary | ICD-10-CM

## 2018-12-19 DIAGNOSIS — F51.01 PRIMARY INSOMNIA: ICD-10-CM

## 2018-12-19 DIAGNOSIS — M79.605 LUMBAR PAIN WITH RADIATION DOWN LEFT LEG: ICD-10-CM

## 2018-12-19 DIAGNOSIS — J06.9 VIRAL URI: ICD-10-CM

## 2018-12-19 DIAGNOSIS — M54.50 LUMBAR PAIN WITH RADIATION DOWN LEFT LEG: ICD-10-CM

## 2018-12-19 PROCEDURE — 99214 OFFICE O/P EST MOD 30 MIN: CPT | Performed by: FAMILY MEDICINE

## 2018-12-19 RX ORDER — ZOLPIDEM TARTRATE 5 MG/1
5 TABLET ORAL NIGHTLY PRN
Qty: 30 TABLET | Refills: 3 | Status: SHIPPED | OUTPATIENT
Start: 2018-12-19 | End: 2019-01-18

## 2018-12-19 RX ORDER — METHYLPREDNISOLONE 4 MG/1
TABLET ORAL
Qty: 1 KIT | Refills: 0 | Status: SHIPPED | OUTPATIENT
Start: 2018-12-19 | End: 2018-12-25

## 2018-12-26 ENCOUNTER — HOSPITAL ENCOUNTER (OUTPATIENT)
Dept: MRI IMAGING | Age: 63
Discharge: HOME | End: 2018-12-28
Payer: COMMERCIAL

## 2018-12-26 DIAGNOSIS — R20.2 LEFT LEG PARESTHESIAS: ICD-10-CM

## 2018-12-26 DIAGNOSIS — R29.898 WEAKNESS OF LEFT LOWER EXTREMITY: ICD-10-CM

## 2018-12-26 DIAGNOSIS — M54.50 LUMBAR PAIN WITH RADIATION DOWN LEFT LEG: ICD-10-CM

## 2018-12-26 DIAGNOSIS — M79.605 LUMBAR PAIN WITH RADIATION DOWN LEFT LEG: ICD-10-CM

## 2018-12-26 PROCEDURE — 72148 MRI LUMBAR SPINE W/O DYE: CPT

## 2018-12-26 RX ORDER — DOXYCYCLINE HYCLATE 100 MG
100 TABLET ORAL 2 TIMES DAILY WITH MEALS
Qty: 20 TABLET | Refills: 0 | Status: SHIPPED | OUTPATIENT
Start: 2018-12-26 | End: 2019-01-05

## 2018-12-27 ENCOUNTER — TELEPHONE (OUTPATIENT)
Dept: FAMILY MEDICINE CLINIC | Age: 63
End: 2018-12-27

## 2018-12-27 DIAGNOSIS — M48.061 FORAMINAL STENOSIS OF LUMBAR REGION: Primary | ICD-10-CM

## 2018-12-27 DIAGNOSIS — M54.42 ACUTE LEFT-SIDED LOW BACK PAIN WITH LEFT-SIDED SCIATICA: ICD-10-CM

## 2018-12-28 ASSESSMENT — ENCOUNTER SYMPTOMS
CHEST TIGHTNESS: 0
VOMITING: 0
SHORTNESS OF BREATH: 0
NAUSEA: 0
COLOR CHANGE: 0
WHEEZING: 0
DIARRHEA: 0
ABDOMINAL PAIN: 0
BLOOD IN STOOL: 0
COUGH: 0
BACK PAIN: 1
CONSTIPATION: 0

## 2019-01-03 ENCOUNTER — OFFICE VISIT (OUTPATIENT)
Dept: FAMILY MEDICINE CLINIC | Age: 64
End: 2019-01-03
Payer: COMMERCIAL

## 2019-01-03 VITALS
DIASTOLIC BLOOD PRESSURE: 85 MMHG | RESPIRATION RATE: 20 BRPM | SYSTOLIC BLOOD PRESSURE: 134 MMHG | HEIGHT: 65 IN | BODY MASS INDEX: 29.16 KG/M2 | HEART RATE: 66 BPM | TEMPERATURE: 97.8 F | WEIGHT: 175 LBS

## 2019-01-03 DIAGNOSIS — M79.605 LUMBAR PAIN WITH RADIATION DOWN LEFT LEG: Primary | ICD-10-CM

## 2019-01-03 DIAGNOSIS — M54.50 LUMBAR PAIN WITH RADIATION DOWN LEFT LEG: Primary | ICD-10-CM

## 2019-01-03 PROCEDURE — 99213 OFFICE O/P EST LOW 20 MIN: CPT | Performed by: FAMILY MEDICINE

## 2019-01-08 ENCOUNTER — EVALUATION (OUTPATIENT)
Dept: PHYSICAL THERAPY | Age: 64
End: 2019-01-08
Payer: COMMERCIAL

## 2019-01-08 DIAGNOSIS — M79.605 LUMBAR PAIN WITH RADIATION DOWN LEFT LEG: Primary | ICD-10-CM

## 2019-01-08 DIAGNOSIS — M54.50 LUMBAR PAIN WITH RADIATION DOWN LEFT LEG: Primary | ICD-10-CM

## 2019-01-08 PROCEDURE — G8982 BODY POS GOAL STATUS: HCPCS | Performed by: PHYSICAL THERAPIST

## 2019-01-08 PROCEDURE — G8981 BODY POS CURRENT STATUS: HCPCS | Performed by: PHYSICAL THERAPIST

## 2019-01-08 PROCEDURE — 97162 PT EVAL MOD COMPLEX 30 MIN: CPT | Performed by: PHYSICAL THERAPIST

## 2019-01-11 ENCOUNTER — TREATMENT (OUTPATIENT)
Dept: PHYSICAL THERAPY | Age: 64
End: 2019-01-11
Payer: COMMERCIAL

## 2019-01-11 DIAGNOSIS — M79.605 LUMBAR PAIN WITH RADIATION DOWN LEFT LEG: Primary | ICD-10-CM

## 2019-01-11 DIAGNOSIS — M54.50 LUMBAR PAIN WITH RADIATION DOWN LEFT LEG: Primary | ICD-10-CM

## 2019-01-11 PROCEDURE — G0283 ELEC STIM OTHER THAN WOUND: HCPCS

## 2019-01-11 PROCEDURE — 97110 THERAPEUTIC EXERCISES: CPT

## 2019-01-14 ENCOUNTER — TREATMENT (OUTPATIENT)
Dept: PHYSICAL THERAPY | Age: 64
End: 2019-01-14
Payer: COMMERCIAL

## 2019-01-14 DIAGNOSIS — M79.605 LUMBAR PAIN WITH RADIATION DOWN LEFT LEG: Primary | ICD-10-CM

## 2019-01-14 DIAGNOSIS — M54.50 LUMBAR PAIN WITH RADIATION DOWN LEFT LEG: Primary | ICD-10-CM

## 2019-01-14 PROCEDURE — G0283 ELEC STIM OTHER THAN WOUND: HCPCS

## 2019-01-14 PROCEDURE — 97110 THERAPEUTIC EXERCISES: CPT

## 2019-01-16 ENCOUNTER — TREATMENT (OUTPATIENT)
Dept: PHYSICAL THERAPY | Age: 64
End: 2019-01-16
Payer: COMMERCIAL

## 2019-01-16 DIAGNOSIS — M54.50 LUMBAR PAIN WITH RADIATION DOWN LEFT LEG: Primary | ICD-10-CM

## 2019-01-16 DIAGNOSIS — M79.605 LUMBAR PAIN WITH RADIATION DOWN LEFT LEG: Primary | ICD-10-CM

## 2019-01-16 PROCEDURE — 97110 THERAPEUTIC EXERCISES: CPT

## 2019-01-16 PROCEDURE — G0283 ELEC STIM OTHER THAN WOUND: HCPCS

## 2019-01-19 ASSESSMENT — ENCOUNTER SYMPTOMS
BACK PAIN: 1
VOMITING: 0
DIARRHEA: 0
NAUSEA: 0
ABDOMINAL PAIN: 0
COUGH: 0
BLOOD IN STOOL: 0
EYE REDNESS: 0
SHORTNESS OF BREATH: 0
CHEST TIGHTNESS: 0
COLOR CHANGE: 0
CONSTIPATION: 0
WHEEZING: 0

## 2019-01-21 ENCOUNTER — TREATMENT (OUTPATIENT)
Dept: PHYSICAL THERAPY | Age: 64
End: 2019-01-21
Payer: COMMERCIAL

## 2019-01-21 DIAGNOSIS — M54.50 LUMBAR PAIN WITH RADIATION DOWN LEFT LEG: Primary | ICD-10-CM

## 2019-01-21 DIAGNOSIS — M79.605 LUMBAR PAIN WITH RADIATION DOWN LEFT LEG: Primary | ICD-10-CM

## 2019-01-21 PROCEDURE — 97014 ELECTRIC STIMULATION THERAPY: CPT | Performed by: PHYSICAL THERAPIST

## 2019-01-21 PROCEDURE — 97110 THERAPEUTIC EXERCISES: CPT | Performed by: PHYSICAL THERAPIST

## 2019-01-24 ENCOUNTER — TREATMENT (OUTPATIENT)
Dept: PHYSICAL THERAPY | Age: 64
End: 2019-01-24
Payer: COMMERCIAL

## 2019-01-24 ENCOUNTER — OFFICE VISIT (OUTPATIENT)
Dept: PHYSICAL MEDICINE AND REHAB | Age: 64
End: 2019-01-24
Payer: COMMERCIAL

## 2019-01-24 VITALS
DIASTOLIC BLOOD PRESSURE: 83 MMHG | SYSTOLIC BLOOD PRESSURE: 151 MMHG | WEIGHT: 174 LBS | HEART RATE: 67 BPM | BODY MASS INDEX: 28.99 KG/M2 | HEIGHT: 65 IN

## 2019-01-24 DIAGNOSIS — M54.17 RADICULOPATHY, LUMBOSACRAL REGION: Primary | ICD-10-CM

## 2019-01-24 DIAGNOSIS — M79.605 LUMBAR PAIN WITH RADIATION DOWN LEFT LEG: Primary | ICD-10-CM

## 2019-01-24 DIAGNOSIS — M54.50 LUMBAR PAIN WITH RADIATION DOWN LEFT LEG: Primary | ICD-10-CM

## 2019-01-24 DIAGNOSIS — M48.061 NEURAL FORAMINAL STENOSIS OF LUMBAR SPINE: ICD-10-CM

## 2019-01-24 PROCEDURE — 99204 OFFICE O/P NEW MOD 45 MIN: CPT | Performed by: PHYSICAL MEDICINE & REHABILITATION

## 2019-01-24 PROCEDURE — 97110 THERAPEUTIC EXERCISES: CPT

## 2019-01-24 PROCEDURE — G0283 ELEC STIM OTHER THAN WOUND: HCPCS

## 2019-01-24 RX ORDER — ZOLPIDEM TARTRATE 10 MG/1
TABLET ORAL NIGHTLY PRN
COMMUNITY
End: 2019-04-23 | Stop reason: SDUPTHER

## 2019-01-24 RX ORDER — GABAPENTIN 300 MG/1
300 CAPSULE ORAL 3 TIMES DAILY
Qty: 90 CAPSULE | Refills: 2 | Status: SHIPPED | OUTPATIENT
Start: 2019-01-24 | End: 2019-05-28 | Stop reason: DRUGHIGH

## 2019-01-29 ENCOUNTER — TREATMENT (OUTPATIENT)
Dept: PHYSICAL THERAPY | Age: 64
End: 2019-01-29
Payer: COMMERCIAL

## 2019-01-29 DIAGNOSIS — M79.605 LUMBAR PAIN WITH RADIATION DOWN LEFT LEG: Primary | ICD-10-CM

## 2019-01-29 DIAGNOSIS — M54.50 LUMBAR PAIN WITH RADIATION DOWN LEFT LEG: Primary | ICD-10-CM

## 2019-01-29 PROCEDURE — 97110 THERAPEUTIC EXERCISES: CPT

## 2019-01-29 PROCEDURE — G0283 ELEC STIM OTHER THAN WOUND: HCPCS

## 2019-01-31 ENCOUNTER — TREATMENT (OUTPATIENT)
Dept: PHYSICAL THERAPY | Age: 64
End: 2019-01-31
Payer: COMMERCIAL

## 2019-01-31 DIAGNOSIS — M54.50 LUMBAR PAIN WITH RADIATION DOWN LEFT LEG: Primary | ICD-10-CM

## 2019-01-31 DIAGNOSIS — M79.605 LUMBAR PAIN WITH RADIATION DOWN LEFT LEG: Primary | ICD-10-CM

## 2019-01-31 PROCEDURE — G0283 ELEC STIM OTHER THAN WOUND: HCPCS

## 2019-01-31 PROCEDURE — 97110 THERAPEUTIC EXERCISES: CPT

## 2019-02-05 ENCOUNTER — TREATMENT (OUTPATIENT)
Dept: PHYSICAL THERAPY | Age: 64
End: 2019-02-05
Payer: COMMERCIAL

## 2019-02-05 DIAGNOSIS — M54.50 LUMBAR PAIN WITH RADIATION DOWN LEFT LEG: Primary | ICD-10-CM

## 2019-02-05 DIAGNOSIS — M79.605 LUMBAR PAIN WITH RADIATION DOWN LEFT LEG: Primary | ICD-10-CM

## 2019-02-05 PROCEDURE — G0283 ELEC STIM OTHER THAN WOUND: HCPCS

## 2019-02-05 PROCEDURE — 97110 THERAPEUTIC EXERCISES: CPT

## 2019-02-06 ENCOUNTER — OFFICE VISIT (OUTPATIENT)
Dept: FAMILY MEDICINE CLINIC | Age: 64
End: 2019-02-06
Payer: COMMERCIAL

## 2019-02-06 VITALS
RESPIRATION RATE: 14 BRPM | BODY MASS INDEX: 28.96 KG/M2 | OXYGEN SATURATION: 97 % | SYSTOLIC BLOOD PRESSURE: 114 MMHG | DIASTOLIC BLOOD PRESSURE: 84 MMHG | WEIGHT: 174 LBS | HEART RATE: 95 BPM

## 2019-02-06 DIAGNOSIS — M48.061 NEURAL FORAMINAL STENOSIS OF LUMBAR SPINE: ICD-10-CM

## 2019-02-06 DIAGNOSIS — F51.01 PRIMARY INSOMNIA: ICD-10-CM

## 2019-02-06 DIAGNOSIS — E11.9 TYPE 2 DIABETES MELLITUS WITHOUT COMPLICATION, WITHOUT LONG-TERM CURRENT USE OF INSULIN (HCC): Primary | ICD-10-CM

## 2019-02-06 DIAGNOSIS — M54.50 LUMBAR PAIN WITH RADIATION DOWN LEFT LEG: ICD-10-CM

## 2019-02-06 DIAGNOSIS — M79.605 LUMBAR PAIN WITH RADIATION DOWN LEFT LEG: ICD-10-CM

## 2019-02-06 DIAGNOSIS — K21.9 GASTROESOPHAGEAL REFLUX DISEASE WITHOUT ESOPHAGITIS: ICD-10-CM

## 2019-02-06 LAB
CREATININE URINE POCT: 200
HBA1C MFR BLD: 7.2 %
MICROALBUMIN/CREAT 24H UR: 150 MG/G{CREAT}
MICROALBUMIN/CREAT UR-RTO: NORMAL

## 2019-02-06 PROCEDURE — 83036 HEMOGLOBIN GLYCOSYLATED A1C: CPT | Performed by: FAMILY MEDICINE

## 2019-02-06 PROCEDURE — 99214 OFFICE O/P EST MOD 30 MIN: CPT | Performed by: FAMILY MEDICINE

## 2019-02-06 PROCEDURE — 82044 UR ALBUMIN SEMIQUANTITATIVE: CPT | Performed by: FAMILY MEDICINE

## 2019-02-06 ASSESSMENT — ENCOUNTER SYMPTOMS
COLOR CHANGE: 0
CONSTIPATION: 0
NAUSEA: 0
EYE REDNESS: 0
COUGH: 0
VOMITING: 0
CHEST TIGHTNESS: 0
SHORTNESS OF BREATH: 0
BACK PAIN: 0
DIARRHEA: 0
BLOOD IN STOOL: 0
WHEEZING: 0
ABDOMINAL PAIN: 0

## 2019-02-06 ASSESSMENT — PATIENT HEALTH QUESTIONNAIRE - PHQ9
SUM OF ALL RESPONSES TO PHQ QUESTIONS 1-9: 0
2. FEELING DOWN, DEPRESSED OR HOPELESS: 0
SUM OF ALL RESPONSES TO PHQ QUESTIONS 1-9: 0
1. LITTLE INTEREST OR PLEASURE IN DOING THINGS: 0
SUM OF ALL RESPONSES TO PHQ9 QUESTIONS 1 & 2: 0

## 2019-02-12 ENCOUNTER — TREATMENT (OUTPATIENT)
Dept: PHYSICAL THERAPY | Age: 64
End: 2019-02-12
Payer: COMMERCIAL

## 2019-02-12 DIAGNOSIS — M79.605 LUMBAR PAIN WITH RADIATION DOWN LEFT LEG: Primary | ICD-10-CM

## 2019-02-12 DIAGNOSIS — M54.50 LUMBAR PAIN WITH RADIATION DOWN LEFT LEG: Primary | ICD-10-CM

## 2019-02-12 PROCEDURE — G0283 ELEC STIM OTHER THAN WOUND: HCPCS

## 2019-02-12 PROCEDURE — 97110 THERAPEUTIC EXERCISES: CPT

## 2019-02-14 ENCOUNTER — TREATMENT (OUTPATIENT)
Dept: PHYSICAL THERAPY | Age: 64
End: 2019-02-14
Payer: COMMERCIAL

## 2019-02-14 DIAGNOSIS — M79.605 LUMBAR PAIN WITH RADIATION DOWN LEFT LEG: Primary | ICD-10-CM

## 2019-02-14 DIAGNOSIS — M54.50 LUMBAR PAIN WITH RADIATION DOWN LEFT LEG: Primary | ICD-10-CM

## 2019-02-14 PROCEDURE — 97110 THERAPEUTIC EXERCISES: CPT

## 2019-02-14 PROCEDURE — G0283 ELEC STIM OTHER THAN WOUND: HCPCS

## 2019-02-19 ENCOUNTER — HOSPITAL ENCOUNTER (EMERGENCY)
Age: 64
Discharge: HOME | End: 2019-02-19
Payer: COMMERCIAL

## 2019-02-19 VITALS
OXYGEN SATURATION: 98 % | TEMPERATURE: 97.8 F | BODY MASS INDEX: 28.99 KG/M2 | HEART RATE: 74 BPM | SYSTOLIC BLOOD PRESSURE: 144 MMHG | RESPIRATION RATE: 20 BRPM | DIASTOLIC BLOOD PRESSURE: 74 MMHG | WEIGHT: 174 LBS | HEIGHT: 65 IN

## 2019-02-19 DIAGNOSIS — J01.90 ACUTE SINUSITIS, RECURRENCE NOT SPECIFIED, UNSPECIFIED LOCATION: Primary | ICD-10-CM

## 2019-02-19 PROCEDURE — 99212 OFFICE O/P EST SF 10 MIN: CPT

## 2019-02-19 RX ORDER — AMOXICILLIN AND CLAVULANATE POTASSIUM 875; 125 MG/1; MG/1
1 TABLET, FILM COATED ORAL 2 TIMES DAILY
Qty: 20 TABLET | Refills: 0 | Status: SHIPPED | OUTPATIENT
Start: 2019-02-19 | End: 2019-02-28 | Stop reason: ALTCHOICE

## 2019-02-19 RX ORDER — BROMPHENIRAMINE MALEATE, PSEUDOEPHEDRINE HYDROCHLORIDE, AND DEXTROMETHORPHAN HYDROBROMIDE 2; 30; 10 MG/5ML; MG/5ML; MG/5ML
10 SYRUP ORAL 4 TIMES DAILY PRN
Qty: 140 ML | Refills: 0 | Status: SHIPPED | OUTPATIENT
Start: 2019-02-19 | End: 2019-02-26

## 2019-02-25 ENCOUNTER — OFFICE VISIT (OUTPATIENT)
Dept: PHYSICAL MEDICINE AND REHAB | Age: 64
End: 2019-02-25
Payer: COMMERCIAL

## 2019-02-25 ENCOUNTER — PREP FOR PROCEDURE (OUTPATIENT)
Dept: PHYSICAL MEDICINE AND REHAB | Age: 64
End: 2019-02-25

## 2019-02-25 VITALS
HEIGHT: 65 IN | BODY MASS INDEX: 29.82 KG/M2 | HEART RATE: 85 BPM | SYSTOLIC BLOOD PRESSURE: 127 MMHG | DIASTOLIC BLOOD PRESSURE: 76 MMHG | WEIGHT: 179 LBS

## 2019-02-25 DIAGNOSIS — M54.16 LUMBAR RADICULITIS: Primary | ICD-10-CM

## 2019-02-25 DIAGNOSIS — M54.17 RADICULOPATHY, LUMBOSACRAL REGION: Primary | ICD-10-CM

## 2019-02-25 PROCEDURE — 99214 OFFICE O/P EST MOD 30 MIN: CPT | Performed by: PHYSICAL MEDICINE & REHABILITATION

## 2019-02-27 ENCOUNTER — TREATMENT (OUTPATIENT)
Dept: PHYSICAL THERAPY | Age: 64
End: 2019-02-27
Payer: COMMERCIAL

## 2019-02-27 ENCOUNTER — TELEPHONE (OUTPATIENT)
Dept: PHYSICAL MEDICINE AND REHAB | Age: 64
End: 2019-02-27

## 2019-02-27 DIAGNOSIS — M79.605 LUMBAR PAIN WITH RADIATION DOWN LEFT LEG: Primary | ICD-10-CM

## 2019-02-27 DIAGNOSIS — M54.50 LUMBAR PAIN WITH RADIATION DOWN LEFT LEG: Primary | ICD-10-CM

## 2019-02-27 PROCEDURE — 97110 THERAPEUTIC EXERCISES: CPT

## 2019-02-27 PROCEDURE — G0283 ELEC STIM OTHER THAN WOUND: HCPCS

## 2019-03-01 ENCOUNTER — TREATMENT (OUTPATIENT)
Dept: PHYSICAL THERAPY | Age: 64
End: 2019-03-01
Payer: COMMERCIAL

## 2019-03-01 DIAGNOSIS — M54.50 LUMBAR PAIN WITH RADIATION DOWN LEFT LEG: Primary | ICD-10-CM

## 2019-03-01 DIAGNOSIS — M79.605 LUMBAR PAIN WITH RADIATION DOWN LEFT LEG: Primary | ICD-10-CM

## 2019-03-01 PROCEDURE — G0283 ELEC STIM OTHER THAN WOUND: HCPCS | Performed by: PHYSICAL THERAPIST

## 2019-03-01 PROCEDURE — 97110 THERAPEUTIC EXERCISES: CPT | Performed by: PHYSICAL THERAPIST

## 2019-03-07 ENCOUNTER — HOSPITAL ENCOUNTER (OUTPATIENT)
Age: 64
Setting detail: OUTPATIENT SURGERY
Discharge: HOME | End: 2019-03-07
Attending: PHYSICAL MEDICINE & REHABILITATION | Admitting: PHYSICAL MEDICINE & REHABILITATION
Payer: COMMERCIAL

## 2019-03-07 ENCOUNTER — HOSPITAL ENCOUNTER (OUTPATIENT)
Dept: OPERATING ROOM | Age: 64
Discharge: HOME | End: 2019-03-07
Payer: COMMERCIAL

## 2019-03-07 VITALS
RESPIRATION RATE: 16 BRPM | OXYGEN SATURATION: 98 % | DIASTOLIC BLOOD PRESSURE: 82 MMHG | HEART RATE: 74 BPM | SYSTOLIC BLOOD PRESSURE: 154 MMHG

## 2019-03-07 DIAGNOSIS — M51.36 DDD (DEGENERATIVE DISC DISEASE), LUMBAR: ICD-10-CM

## 2019-03-07 PROCEDURE — 6360000004 HC RX CONTRAST MEDICATION: Performed by: PHYSICAL MEDICINE & REHABILITATION

## 2019-03-07 PROCEDURE — 6360000002 HC RX W HCPCS: Performed by: PHYSICAL MEDICINE & REHABILITATION

## 2019-03-07 PROCEDURE — 2500000003 HC RX 250 WO HCPCS: Performed by: PHYSICAL MEDICINE & REHABILITATION

## 2019-03-07 PROCEDURE — 2709999900 HC NON-CHARGEABLE SUPPLY: Performed by: PHYSICAL MEDICINE & REHABILITATION

## 2019-03-07 PROCEDURE — 7100000010 HC PHASE II RECOVERY - FIRST 15 MIN: Performed by: PHYSICAL MEDICINE & REHABILITATION

## 2019-03-07 PROCEDURE — 3209999900 FLUORO FOR SURGICAL PROCEDURES

## 2019-03-07 PROCEDURE — 64483 NJX AA&/STRD TFRM EPI L/S 1: CPT | Performed by: PHYSICAL MEDICINE & REHABILITATION

## 2019-03-07 PROCEDURE — 7100000011 HC PHASE II RECOVERY - ADDTL 15 MIN: Performed by: PHYSICAL MEDICINE & REHABILITATION

## 2019-03-07 PROCEDURE — 2580000003 HC RX 258: Performed by: PHYSICAL MEDICINE & REHABILITATION

## 2019-03-07 PROCEDURE — 3600000005 HC SURGERY LEVEL 5 BASE: Performed by: PHYSICAL MEDICINE & REHABILITATION

## 2019-03-07 RX ORDER — LIDOCAINE HYDROCHLORIDE 10 MG/ML
INJECTION, SOLUTION EPIDURAL; INFILTRATION; INTRACAUDAL; PERINEURAL PRN
Status: DISCONTINUED | OUTPATIENT
Start: 2019-03-07 | End: 2019-03-07 | Stop reason: ALTCHOICE

## 2019-03-07 ASSESSMENT — PAIN SCALES - GENERAL
PAINLEVEL_OUTOF10: 0
PAINLEVEL_OUTOF10: 0

## 2019-03-07 ASSESSMENT — PAIN - FUNCTIONAL ASSESSMENT: PAIN_FUNCTIONAL_ASSESSMENT: 0-10

## 2019-03-07 ASSESSMENT — PAIN DESCRIPTION - DESCRIPTORS: DESCRIPTORS: NUMBNESS

## 2019-03-25 ENCOUNTER — PREP FOR PROCEDURE (OUTPATIENT)
Dept: PHYSICAL MEDICINE AND REHAB | Age: 64
End: 2019-03-25

## 2019-03-25 ENCOUNTER — OFFICE VISIT (OUTPATIENT)
Dept: PHYSICAL MEDICINE AND REHAB | Age: 64
End: 2019-03-25
Payer: COMMERCIAL

## 2019-03-25 VITALS
DIASTOLIC BLOOD PRESSURE: 74 MMHG | SYSTOLIC BLOOD PRESSURE: 116 MMHG | HEART RATE: 68 BPM | WEIGHT: 181 LBS | BODY MASS INDEX: 30.16 KG/M2 | HEIGHT: 65 IN

## 2019-03-25 DIAGNOSIS — M54.17 LUMBOSACRAL RADICULITIS: Primary | ICD-10-CM

## 2019-03-25 PROCEDURE — 99213 OFFICE O/P EST LOW 20 MIN: CPT | Performed by: PHYSICAL MEDICINE & REHABILITATION

## 2019-03-28 ENCOUNTER — HOSPITAL ENCOUNTER (OUTPATIENT)
Age: 64
Setting detail: OUTPATIENT SURGERY
Discharge: HOME | End: 2019-03-28
Attending: PHYSICAL MEDICINE & REHABILITATION | Admitting: PHYSICAL MEDICINE & REHABILITATION
Payer: COMMERCIAL

## 2019-03-28 ENCOUNTER — HOSPITAL ENCOUNTER (OUTPATIENT)
Dept: OPERATING ROOM | Age: 64
Discharge: HOME | End: 2019-03-28
Payer: COMMERCIAL

## 2019-03-28 VITALS
SYSTOLIC BLOOD PRESSURE: 136 MMHG | RESPIRATION RATE: 16 BRPM | HEART RATE: 69 BPM | DIASTOLIC BLOOD PRESSURE: 70 MMHG | OXYGEN SATURATION: 99 % | TEMPERATURE: 98 F

## 2019-03-28 DIAGNOSIS — M54.16 LUMBAR RADICULOPATHY: ICD-10-CM

## 2019-03-28 PROCEDURE — 3209999900 FLUORO FOR SURGICAL PROCEDURES

## 2019-03-28 PROCEDURE — 7100000011 HC PHASE II RECOVERY - ADDTL 15 MIN: Performed by: PHYSICAL MEDICINE & REHABILITATION

## 2019-03-28 PROCEDURE — 6360000002 HC RX W HCPCS: Performed by: PHYSICAL MEDICINE & REHABILITATION

## 2019-03-28 PROCEDURE — 3600000005 HC SURGERY LEVEL 5 BASE: Performed by: PHYSICAL MEDICINE & REHABILITATION

## 2019-03-28 PROCEDURE — 64483 NJX AA&/STRD TFRM EPI L/S 1: CPT | Performed by: PHYSICAL MEDICINE & REHABILITATION

## 2019-03-28 PROCEDURE — 2580000003 HC RX 258: Performed by: PHYSICAL MEDICINE & REHABILITATION

## 2019-03-28 PROCEDURE — 6360000004 HC RX CONTRAST MEDICATION: Performed by: PHYSICAL MEDICINE & REHABILITATION

## 2019-03-28 PROCEDURE — 7100000010 HC PHASE II RECOVERY - FIRST 15 MIN: Performed by: PHYSICAL MEDICINE & REHABILITATION

## 2019-03-28 PROCEDURE — 2709999900 HC NON-CHARGEABLE SUPPLY: Performed by: PHYSICAL MEDICINE & REHABILITATION

## 2019-03-28 PROCEDURE — 2500000003 HC RX 250 WO HCPCS: Performed by: PHYSICAL MEDICINE & REHABILITATION

## 2019-03-28 RX ORDER — LIDOCAINE HYDROCHLORIDE 10 MG/ML
INJECTION, SOLUTION EPIDURAL; INFILTRATION; INTRACAUDAL; PERINEURAL PRN
Status: DISCONTINUED | OUTPATIENT
Start: 2019-03-28 | End: 2019-03-28 | Stop reason: ALTCHOICE

## 2019-03-28 ASSESSMENT — PAIN - FUNCTIONAL ASSESSMENT: PAIN_FUNCTIONAL_ASSESSMENT: 0-10

## 2019-03-28 ASSESSMENT — PAIN SCALES - GENERAL
PAINLEVEL_OUTOF10: 0
PAINLEVEL_OUTOF10: 0

## 2019-04-22 ENCOUNTER — OFFICE VISIT (OUTPATIENT)
Dept: PHYSICAL MEDICINE AND REHAB | Age: 64
End: 2019-04-22
Payer: COMMERCIAL

## 2019-04-22 VITALS
HEART RATE: 65 BPM | WEIGHT: 182 LBS | BODY MASS INDEX: 30.32 KG/M2 | DIASTOLIC BLOOD PRESSURE: 89 MMHG | SYSTOLIC BLOOD PRESSURE: 146 MMHG | HEIGHT: 65 IN

## 2019-04-22 DIAGNOSIS — M47.816 LUMBAR SPONDYLOSIS: ICD-10-CM

## 2019-04-22 DIAGNOSIS — M25.562 CHRONIC PAIN OF LEFT KNEE: ICD-10-CM

## 2019-04-22 DIAGNOSIS — M54.17 RADICULOPATHY, LUMBOSACRAL REGION: Primary | ICD-10-CM

## 2019-04-22 DIAGNOSIS — G89.29 CHRONIC PAIN OF LEFT KNEE: ICD-10-CM

## 2019-04-22 DIAGNOSIS — M17.12 OSTEOARTHRITIS OF LEFT KNEE, UNSPECIFIED OSTEOARTHRITIS TYPE: ICD-10-CM

## 2019-04-22 DIAGNOSIS — F51.01 PRIMARY INSOMNIA: Primary | ICD-10-CM

## 2019-04-22 PROCEDURE — 20610 DRAIN/INJ JOINT/BURSA W/O US: CPT | Performed by: PHYSICAL MEDICINE & REHABILITATION

## 2019-04-22 PROCEDURE — 99214 OFFICE O/P EST MOD 30 MIN: CPT | Performed by: PHYSICAL MEDICINE & REHABILITATION

## 2019-04-22 RX ORDER — TRIAMCINOLONE ACETONIDE 40 MG/ML
20 INJECTION, SUSPENSION INTRA-ARTICULAR; INTRAMUSCULAR ONCE
Status: COMPLETED | OUTPATIENT
Start: 2019-04-22 | End: 2019-04-22

## 2019-04-22 RX ORDER — LIDOCAINE HYDROCHLORIDE 10 MG/ML
4 INJECTION, SOLUTION INFILTRATION; PERINEURAL ONCE
Status: COMPLETED | OUTPATIENT
Start: 2019-04-22 | End: 2019-04-22

## 2019-04-22 RX ADMIN — LIDOCAINE HYDROCHLORIDE 4 ML: 10 INJECTION, SOLUTION INFILTRATION; PERINEURAL at 09:36

## 2019-04-22 RX ADMIN — TRIAMCINOLONE ACETONIDE 20 MG: 40 INJECTION, SUSPENSION INTRA-ARTICULAR; INTRAMUSCULAR at 09:36

## 2019-04-22 NOTE — PATIENT INSTRUCTIONS
We appreciate that you chose [facility] to provide your healthcare needs today. We took great pleasure in caring for you. You may receive a survey to help us learn how to make your next visit to a [facility] facility better than the last.   Your feedback is important to help us continually improve the service we can provide you. Please take the time to complete it thoughtfully if you receive one as we value the feedback in every survey. In this survey we would appreciate that you answer \"always\" or \"yes\" for as many questions as possible (this is the answer we get credit for doing a good job). If you feel there is a question you cannot answer \"always\" or \"yes\", please let us know before you leave today so we can remedy the situation right away. We look forward to continuing to provide you with excellent care. Considering the survey questions related to access to care, please keep in mind the urgency of your problem (i.e. was it an emergent or urgent visit or more routine)  and whether the urgency of that problem was handled appropriately by our office. We always do our best to prioritize the patients who need the care most urgently given our specialty is in short supply and there is a high demand for visits. Thank you for your time and consideration.

## 2019-04-22 NOTE — PROGRESS NOTES
[place] Cool, [address] [facility]  [address] [place]  Phone: [phone]  Fax: [phone]    PCP: Stella Gregorio MD  Date of visit: 4/22/19    Chief Complaint   Patient presents with    Back Pain     3 week follow up     Interval:   Patient presents today for injection follow up. She underwent second left L4-5 TFESI and reports 75 % relief in her symptoms. She still feels soreness in her ankle after use and pain in the lateral thigh and knee. Most pain is in the knee The pain in the thigh and knee is worse with stairs, weight bearing. Located medial knee. Today, the pain is rated Pain Score:   0 - No pain. The pain is better with PT, neurontin. There is associated numbness/tingling--left medial ankle. There is no weakness. . There is no bowel/bladder changes. The prior workup has included: Xray, MRI    The prior treatment has included:  PT: yes  Chiropractic: none    Modalities: ice with no relief   OTC Tylenol: none   NSAIDS: yes with no relief, voltaren    Opioids: norco with no relief   Membrane stabilizers: neurontin    Muscle relaxers: none   Previous injections: left L4 TFESI x 2   Previous surgery at this site: 2003- laminectomy      Allergies   Allergen Reactions    Statins Other (See Comments)     Unable to tolerate GI symptoms  Lipitor and Crestor       Current Outpatient Medications   Medication Sig Dispense Refill    empagliflozin (JARDIANCE) 25 MG tablet Take 25 mg by mouth daily 30 tablet 5    zolpidem (AMBIEN) 10 MG tablet Take by mouth nightly as needed for Sleep. Radha Harris diclofenac (VOLTAREN) 50 MG EC tablet Take 1 tablet PO BID x 10 days and then BID PRN 60 tablet 2    gabapentin (NEURONTIN) 300 MG capsule Take 1 capsule by mouth 3 times daily for 30 days. . 90 capsule 2     Current Facility-Administered Medications   Medication Dose Route Frequency Provider Last Rate Last Dose    lidocaine 1 % injection 4 12/26/18:  FINDINGS: Multiplanar, multisequence MR images of the lumbar spine.       Intact lumbar vertebral height. Oval focus of hyperintense signal on   T1 and T2-weighted images within the L5 vertebra probably a   hemangioma. Disc space narrowing with reactive endplate changes at   U6-H6. Conus medullaris T12. Nerve roots normally distributed within   the thecal sac.       T12-L1, no disc herniation or central spinal canal or foraminal   stenosis. Similar findings L1-2.       L2-3, small disc bulge with mild bilateral neural foraminal stenosis. No high-grade central spinal canal stenosis. Facet arthritis.       L3-4, small disc bulge, facet arthritis and ligamentous hypertrophy   with borderline central spinal canal stenosis and mild bilateral   neural foraminal stenosis.       L4-5, disc bulge, facet arthritis and ligamentous hypertrophy with   mild central spinal canal stenosis. Mild right and mild to moderate   left neural foraminal stenosis.       L5-S1, no disc herniation or high-grade central spinal canal or   foraminal stenosis.         Impression   1. No evidence of lumbar disc herniation or high-grade spinal   stenosis. 2. Neural foraminal stenosis at multiple levels most severe on the   left at L4-5. Impression:   Matthew Neal is a 66 y.o. female     1. Radiculopathy, lumbosacral region    2. Chronic pain of left knee    3. Osteoarthritis of left knee, unspecified osteoarthritis type    4. Lumbar spondylosis      L4 radiculopathy    Plan:   · Did well with second epidural.   · Now with complaints possibly MSK in nature. Will inject left knee today and monitor response. · Obtain left knee x-ray   · Can increase neurontin to 600mg qhs      The patient was educated about the diagnosis, prognosis, indications, risks and benefits of treatment. An opportunity to ask questions was given to the patient and questions were answered.   The patient agreed to proceed with the recommended treatment as described above. Follow up 4-6 weeks       Funmilayo Smith DO, FAAPMR   Board Certified Physical Medicine and Rehabilitation      PROCEDURE NOTE:  Pre-procedure Verification: verified patient, procedure and site  Site Marked: Yes  Timeout: completed prior to procedure    After having explained the potential risks (including but not limited to infection, bleeding, skin color change, post-injection soreness, hyperglycemia) and benefits of the left knee steroid injection, the patient gave verbal and written consent to proceed. The area was prepped in aseptic fashion with Betadine. A 1.5 inch 22g needle was directed into the above area. The injection was completed with 1 mL of Kenalog 20mg/mL mixed with 4 mL of 1% lidocaine after no blood was aspirated on pull back. The patient tolerated the procedure without difficulty and was instructed on post-injection care including the use of ice and elevation for 10-15 minutes at a time for post-injection soreness. If the patient develops severe pain, fevers, redness, swelling, they should call our office or go to ED for evaluation.

## 2019-04-23 ENCOUNTER — TELEPHONE (OUTPATIENT)
Dept: PHYSICAL MEDICINE AND REHAB | Age: 64
End: 2019-04-23

## 2019-04-23 ENCOUNTER — TELEPHONE (OUTPATIENT)
Dept: FAMILY MEDICINE CLINIC | Age: 64
End: 2019-04-23

## 2019-04-23 DIAGNOSIS — G47.9 SLEEP DISORDER: Primary | ICD-10-CM

## 2019-04-23 NOTE — TELEPHONE ENCOUNTER
Called and spoke with the patient to inform her that her xray of her knee showed arthritis. Patient aware and voiced understanding.

## 2019-04-25 ENCOUNTER — PATIENT MESSAGE (OUTPATIENT)
Dept: FAMILY MEDICINE CLINIC | Age: 64
End: 2019-04-25

## 2019-04-25 DIAGNOSIS — F51.01 PRIMARY INSOMNIA: ICD-10-CM

## 2019-04-25 DIAGNOSIS — E11.9 TYPE 2 DIABETES MELLITUS WITHOUT COMPLICATION, WITHOUT LONG-TERM CURRENT USE OF INSULIN (HCC): ICD-10-CM

## 2019-04-25 RX ORDER — ZOLPIDEM TARTRATE 10 MG/1
10 TABLET ORAL NIGHTLY PRN
Qty: 30 TABLET | Refills: 2 | Status: SHIPPED | OUTPATIENT
Start: 2019-04-25 | End: 2019-04-25 | Stop reason: SDUPTHER

## 2019-04-25 RX ORDER — ZOLPIDEM TARTRATE 12.5 MG/1
12.5 TABLET, FILM COATED, EXTENDED RELEASE ORAL NIGHTLY PRN
Qty: 30 TABLET | Refills: 2 | Status: SHIPPED | OUTPATIENT
Start: 2019-04-25 | End: 2019-04-25 | Stop reason: SDUPTHER

## 2019-04-25 RX ORDER — ZOLPIDEM TARTRATE 5 MG/1
TABLET ORAL
Qty: 30 TABLET | Refills: 0 | OUTPATIENT
Start: 2019-04-25

## 2019-04-25 RX ORDER — ZOLPIDEM TARTRATE 12.5 MG/1
12.5 TABLET, FILM COATED, EXTENDED RELEASE ORAL NIGHTLY PRN
Qty: 90 TABLET | Refills: 0 | Status: SHIPPED | OUTPATIENT
Start: 2019-04-25 | End: 2019-08-07 | Stop reason: SDUPTHER

## 2019-04-25 NOTE — TELEPHONE ENCOUNTER
From: Rojas Gao  To: Timmy Govea MD  Sent: 4/25/2019 9:32 AM EDT  Subject: Prescription Question    I am waiting onZolpidem refill to be sent in to [facility] today. However we have changed insurance companies and next month I need to switch pharmacy to [facility] in [address]. The insurance requires prescriptions for 90 days with refills. I believe that I will need paper prescriptions for the first time. May I please get these for both the Zolpidem and the Jardiance? You can either mail them or I can pick them up.     Thanks    Royce Lovett 1955

## 2019-05-21 ENCOUNTER — TELEPHONE (OUTPATIENT)
Dept: PHYSICAL MEDICINE AND REHAB | Age: 64
End: 2019-05-21

## 2019-05-21 NOTE — TELEPHONE ENCOUNTER
Called patient to follow up on left knee injection. No answer, left message for a second time. Will await call back.

## 2019-05-28 ENCOUNTER — OFFICE VISIT (OUTPATIENT)
Dept: PHYSICAL MEDICINE AND REHAB | Age: 64
End: 2019-05-28
Payer: COMMERCIAL

## 2019-05-28 VITALS
HEIGHT: 65 IN | WEIGHT: 178 LBS | RESPIRATION RATE: 16 BRPM | HEART RATE: 78 BPM | OXYGEN SATURATION: 98 % | BODY MASS INDEX: 29.66 KG/M2 | DIASTOLIC BLOOD PRESSURE: 80 MMHG | SYSTOLIC BLOOD PRESSURE: 128 MMHG

## 2019-05-28 DIAGNOSIS — M47.816 LUMBAR SPONDYLOSIS: ICD-10-CM

## 2019-05-28 DIAGNOSIS — M54.16 LUMBAR RADICULITIS: Primary | ICD-10-CM

## 2019-05-28 DIAGNOSIS — M17.12 OSTEOARTHRITIS OF LEFT KNEE, UNSPECIFIED OSTEOARTHRITIS TYPE: ICD-10-CM

## 2019-05-28 DIAGNOSIS — M54.17 RADICULOPATHY, LUMBOSACRAL REGION: ICD-10-CM

## 2019-05-28 PROCEDURE — 99213 OFFICE O/P EST LOW 20 MIN: CPT | Performed by: PHYSICAL MEDICINE & REHABILITATION

## 2019-05-28 RX ORDER — GABAPENTIN 600 MG/1
600 TABLET ORAL NIGHTLY
Qty: 90 TABLET | Refills: 2 | Status: SHIPPED | OUTPATIENT
Start: 2019-05-28 | End: 2019-08-30

## 2019-05-28 NOTE — PROGRESS NOTES
[employer], [address] [facility] Physical Medicine and Rehabilitation  [address] [facility] [place]  Phone: [phone]  Fax: [phone]    PCP: Vishal Sahni MD  Date of visit: 5/28/19    Chief Complaint   Patient presents with    Back Pain    Knee Pain     Interval:   Patient presents today for follow up visit regarding back and left leg and knee pain. Last visit, I did a left knee steroid injection. She reports 70% relief in her knee pain. Still reports pain, worse at night after being on her feet all day and with going up and down stairs. Located medial knee. Today, the pain is rated Pain Score:   1. The pain is better with PT, neurontin which was increased to 600mg qhs last visit. There is associated numbness/tingling--left medial ankle. There is no weakness. . There is no bowel/bladder changes. The prior workup has included: Xray, MRI    The prior treatment has included:  PT: yes  Chiropractic: none    Modalities: ice with no relief   OTC Tylenol: none   NSAIDS: yes with no relief, voltaren    Opioids: norco with no relief   Membrane stabilizers: neurontin    Muscle relaxers: none   Previous injections: left L4 TFESI x 2  Left knee steroid    Previous surgery at this site: 2003- laminectomy      Allergies   Allergen Reactions    Statins Other (See Comments)     Unable to tolerate GI symptoms  Lipitor and Crestor       Current Outpatient Medications   Medication Sig Dispense Refill    gabapentin (NEURONTIN) 600 MG tablet Take 1 tablet by mouth nightly for 30 days. 90 tablet 2    zolpidem (AMBIEN CR) 12.5 MG extended release tablet Take 1 tablet by mouth nightly as needed for Sleep for up to 90 days. 90 tablet 0    empagliflozin (JARDIANCE) 25 MG tablet Take 25 mg by mouth daily 90 tablet 3    diclofenac (VOLTAREN) 50 MG EC tablet Take 1 tablet PO BID x 10 days and then BID PRN 60 tablet 2     No current facility-administered medications for this visit. Past Medical History:   Diagnosis Date    Embolism ([address] 2003    pulmonary post op    Hx of blood clots     SHANTANU (obstructive sleep apnea)     Unable to tolerate CPAP    Type 2 diabetes mellitus without complication ([address] 4/29/2465       Past Surgical History:   Procedure Laterality Date    ANESTHESIA NERVE BLOCK Left 3/7/2019    LEFT L4-5 TRANSFORAMINAL EPIDURAL STEROID INJECTION performed by Joe Salguero DO at [address] Left 3/28/2019    LEFT L4-5 TRANSFORAMINAL EPIDURAL STEROID INJECTION performed by Joe Salguero DO at [address]  2003    Lumbar, laminectomy, Dr. Justin Luis  2002    Adhesions, menorrhagia    INGUINAL HERNIA REPAIR  1961, 1962    NERVE BLOCK Left 03/07/2019    transforaminal epidural    NERVE BLOCK Left 03/28/2019    NOSE SURGERY  2008    Nasal ablation    TONSILLECTOMY  1975       Family History   Problem Relation Age of Onset    Cancer Brother         Lung    Diabetes Father     Heart Disease Paternal Grandfather      Social History     Tobacco Use    Smoking status: Never Smoker    Smokeless tobacco: Never Used   Substance Use Topics    Alcohol use: Yes     Alcohol/week: 0.0 oz     Comment: occassionally    Drug use: No       Functional Status: The patient is able to ambulate and perform activities of daily living without the use of an assistive device. ROS:    Constitutional: Denies fevers, chills, night sweats, unintentional weight loss     Skin: Denies rash or skin changes     Eyes: Denies vision changes    Ears/Nose/Throat: Denies nasal congestion or sore throat     Respiratory: Denies SOB or cough     Cardiovascular: Denies CP, palpitations, edema      Gastrointestinal: Denies abdominal pain,  N/V, constipation, or diarrhea    Genitourinary: Denies urinary symptoms    Neurologic: See HPI.     MSK: See HPI.      Psychiatric: Denies sleep disturbance, anxiety, depression    Hematologic/Lymphatic/Immunologic: Denies bruising       Physical Exam:   Blood pressure 128/80, pulse 78, resp. rate 16, height 5' 5\" (1.651 m), weight 178 lb (80.7 kg), SpO2 98 %, not currently breastfeeding. General: well developed and well nourished in no acute distress. Body habitus is normal.   HEENT: No rhinorrhea, sneezing, yawning, or lacrimation. No scleral icterus or conjunctival injection. Resp: symmetrical chest expansion, unlabored breathing, respirations unlabored. CV: Heart rate is regular. Peripheral pulses are palpable  Lymph: No visible regional lymphadenopathy. Skin: No rashes or ecchymosis. Normal turgor. Psych: Mood is calm. Affect is normal.   Ext: No edema noted     MSK:   Back/Hip Exam:   Inspection: Pelvis was symmetric. Lumbar lordotic curvature was normal. There was no scoliosis. No ecchymoses or erythema. Palpation: Palpatory exam revealed tenderness along left L4-S1 lumbosacral paraspinals and midline spine, no ttpSI joint sulcus, mild ttp left greater trochanter. ttp left TFL. There was paraspinal spasms. There were no trigger points. LeftKnee:  No edema, warmth, erythema, ecchymosis, effusion, instability, mass or deformity of the left knee. Full AROM left. +crepitus. TTP medial joint line. Negative patellofemoral grind. No medial or lateral collateral ligament tenderness. Negative Vivi. Negative Lachman. No varus or valgus instability. Imaging: (personally reviewed by me 05/28/19)  MRI L Spine 12/26/18:  FINDINGS: Multiplanar, multisequence MR images of the lumbar spine.       Intact lumbar vertebral height. Oval focus of hyperintense signal on   T1 and T2-weighted images within the L5 vertebra probably a   hemangioma. Disc space narrowing with reactive endplate changes at   R6-Y5. Conus medullaris T12.  Nerve roots normally distributed within   the thecal sac.       T12-L1, no disc herniation or central spinal canal or foraminal   stenosis. Similar findings L1-2.       L2-3, small disc bulge with mild bilateral neural foraminal stenosis. No high-grade central spinal canal stenosis. Facet arthritis.       L3-4, small disc bulge, facet arthritis and ligamentous hypertrophy   with borderline central spinal canal stenosis and mild bilateral   neural foraminal stenosis.       L4-5, disc bulge, facet arthritis and ligamentous hypertrophy with   mild central spinal canal stenosis. Mild right and mild to moderate   left neural foraminal stenosis.       L5-S1, no disc herniation or high-grade central spinal canal or   foraminal stenosis.         Impression   1. No evidence of lumbar disc herniation or high-grade spinal   stenosis. 2. Neural foraminal stenosis at multiple levels most severe on the   left at L4-5. Impression:   Brien Coleman is a 66 y.o. female     1. Lumbar radiculitis    2. Lumbar spondylosis    3. Radiculopathy, lumbosacral region    4. Osteoarthritis of left knee, unspecified osteoarthritis type        Plan:   · Doing well overall. Pain is manageable. Refilled neurontin 600mg qhs. · Discussed treatment options for knee pain including PT, bracing, steroid/viscosupplementation. The patient was educated about the diagnosis, prognosis, indications, risks and benefits of treatment. An opportunity to ask questions was given to the patient and questions were answered. The patient agreed to proceed with the recommended treatment as described above. Follow up in 2 months.        Diana Garcia DO, [facility]   Board Certified Physical Medicine and Rehabilitation

## 2019-07-29 ENCOUNTER — OFFICE VISIT (OUTPATIENT)
Dept: PHYSICAL MEDICINE AND REHAB | Age: 64
End: 2019-07-29
Payer: COMMERCIAL

## 2019-07-29 VITALS
HEIGHT: 65 IN | SYSTOLIC BLOOD PRESSURE: 120 MMHG | BODY MASS INDEX: 29.82 KG/M2 | HEART RATE: 74 BPM | DIASTOLIC BLOOD PRESSURE: 70 MMHG | WEIGHT: 179 LBS

## 2019-07-29 DIAGNOSIS — M47.816 LUMBAR SPONDYLOSIS: ICD-10-CM

## 2019-07-29 DIAGNOSIS — M54.16 LUMBAR RADICULITIS: Primary | ICD-10-CM

## 2019-07-29 DIAGNOSIS — M54.17 RADICULOPATHY, LUMBOSACRAL REGION: ICD-10-CM

## 2019-07-29 PROCEDURE — 99214 OFFICE O/P EST MOD 30 MIN: CPT | Performed by: PHYSICAL MEDICINE & REHABILITATION

## 2019-07-29 NOTE — PROGRESS NOTES
Bubba Wilcox, [address] [facility] Physical Medicine and Rehabilitation  [address] [facility] [place]  Phone: [phone]  Fax: [phone]    PCP: Mattie Lopez MD  Date of visit: 7/29/19    Chief Complaint   Patient presents with    Back Pain     Follow up    Knee Pain     left leg and knee pain     Interval:   Patient presents today for follow up visit regarding back and left leg and knee pain. Since last visit, she has been doing better, but has noticed her pain coming back. Her last epidural was in March. She had over 50% relief in her pain until recently. Her pain is located in the left low back with radiation into left leg and into the shin where there is numbness. Today, the pain is rated Pain Score:   2. The pain is better with PT, neurontin 600mg qhs. She tried stopping neurontin and her pain was worse. There is associated numbness/tingling--left medial ankle. There is no weakness. . There is no bowel/bladder changes. The prior workup has included: Xray, MRI    The prior treatment has included:  PT: yes  Chiropractic: none    Modalities: ice with no relief   OTC Tylenol: none   NSAIDS: yes with no relief, voltaren    Opioids: norco with no relief   Membrane stabilizers: neurontin    Muscle relaxers: none   Previous injections: left L4 TFESI x 2  Left knee steroid    Previous surgery at this site: 2003- laminectomy      Allergies   Allergen Reactions    Statins Other (See Comments)     Unable to tolerate GI symptoms  Lipitor and Crestor       Current Outpatient Medications   Medication Sig Dispense Refill    gabapentin (NEURONTIN) 600 MG tablet Take 1 tablet by mouth nightly for 30 days.  90 tablet 2    empagliflozin (JARDIANCE) 25 MG tablet Take 25 mg by mouth daily 90 tablet 3    diclofenac (VOLTAREN) 50 MG EC tablet Take 1 tablet PO BID x 10 days and then BID PRN (Patient not taking: Reported on 7/29/2019) 60 tablet 2     No current facility-administered medications for this visit. Past Medical History:   Diagnosis Date    Embolism [address] 2003    pulmonary post op    Hx of blood clots     SHANTANU (obstructive sleep apnea)     Unable to tolerate CPAP    Type 2 diabetes mellitus without complication ([facility] [address] 6/95/3135       Past Surgical History:   Procedure Laterality Date    ANESTHESIA NERVE BLOCK Left 3/7/2019    LEFT L4-5 TRANSFORAMINAL EPIDURAL STEROID INJECTION performed by Juan J Levine DO at [address] Left 3/28/2019    LEFT L4-5 TRANSFORAMINAL EPIDURAL STEROID INJECTION performed by Juan J Levine DO at [address]   2003    Lumbar, laminectomy, Dr. Jesse Wakefield  2002    Adhesions, menorrhagia    INGUINAL HERNIA REPAIR  1961, 1962    NERVE BLOCK Left 03/07/2019    transforaminal epidural    NERVE BLOCK Left 03/28/2019    NOSE SURGERY  2008    Nasal ablation    TONSILLECTOMY  1975       Family History   Problem Relation Age of Onset    Cancer Brother         Lung    Diabetes Father     Heart Disease Paternal Grandfather      Social History     Tobacco Use    Smoking status: Never Smoker    Smokeless tobacco: Never Used   Substance Use Topics    Alcohol use: Yes     Alcohol/week: 0.0 standard drinks     Comment: occassionally    Drug use: No       Functional Status: The patient is able to ambulate and perform activities of daily living without the use of an assistive device.            ROS:    Constitutional: Denies fevers, chills, night sweats, unintentional weight loss     Skin: Denies rash or skin changes     Eyes: Denies vision changes    Ears/Nose/Throat: Denies nasal congestion or sore throat     Respiratory: Denies SOB or cough     Cardiovascular: Denies CP, palpitations, edema      Gastrointestinal: Denies abdominal pain,  N/V, constipation, or diarrhea    Genitourinary: Denies urinary symptoms    Neurologic: See narrowing with reactive endplate changes at   M2-P4. Conus medullaris T12. Nerve roots normally distributed within   the thecal sac.       T12-L1, no disc herniation or central spinal canal or foraminal   stenosis. Similar findings L1-2.       L2-3, small disc bulge with mild bilateral neural foraminal stenosis. No high-grade central spinal canal stenosis. Facet arthritis.       L3-4, small disc bulge, facet arthritis and ligamentous hypertrophy   with borderline central spinal canal stenosis and mild bilateral   neural foraminal stenosis.       L4-5, disc bulge, facet arthritis and ligamentous hypertrophy with   mild central spinal canal stenosis. Mild right and mild to moderate   left neural foraminal stenosis.       L5-S1, no disc herniation or high-grade central spinal canal or   foraminal stenosis.         Impression   1. No evidence of lumbar disc herniation or high-grade spinal   stenosis. 2. Neural foraminal stenosis at multiple levels most severe on the   left at L4-5. L knee x-ray   Findings: There is no evidence of acute fracture or dislocation. There is mild   lateral and patellofemoral compartment arthrosis. There is no large   joint effusion. Overlying soft tissues are grossly unremarkable.           Impression   Mild lateral and patellofemoral compartment osteoarthrosis. Impression:   Rena Rausch is a 66 y.o. female     1. Lumbar radiculitis    2. Lumbar spondylosis    3. Radiculopathy, lumbosacral region        Plan:   · Patient would benefit from left L4-5 TFESI. Procedure risks, benefits and alternatives were discussed. Patient would like to proceed. · Continue neurontin 600mg qhs. The patient was educated about the diagnosis, prognosis, indications, risks and benefits of treatment. An opportunity to ask questions was given to the patient and questions were answered. The patient agreed to proceed with the recommended treatment as described above.      Follow up after

## 2019-08-07 ENCOUNTER — OFFICE VISIT (OUTPATIENT)
Dept: FAMILY MEDICINE CLINIC | Age: 64
End: 2019-08-07
Payer: COMMERCIAL

## 2019-08-07 ENCOUNTER — HOSPITAL ENCOUNTER (OUTPATIENT)
Age: 64
Discharge: HOME | End: 2019-08-09
Payer: COMMERCIAL

## 2019-08-07 VITALS
BODY MASS INDEX: 29.62 KG/M2 | OXYGEN SATURATION: 98 % | HEART RATE: 83 BPM | RESPIRATION RATE: 14 BRPM | SYSTOLIC BLOOD PRESSURE: 110 MMHG | WEIGHT: 178 LBS | DIASTOLIC BLOOD PRESSURE: 68 MMHG

## 2019-08-07 DIAGNOSIS — Z12.11 SCREENING FOR COLON CANCER: ICD-10-CM

## 2019-08-07 DIAGNOSIS — E11.9 TYPE 2 DIABETES MELLITUS WITHOUT COMPLICATION, WITHOUT LONG-TERM CURRENT USE OF INSULIN (HCC): ICD-10-CM

## 2019-08-07 DIAGNOSIS — F51.01 PRIMARY INSOMNIA: ICD-10-CM

## 2019-08-07 DIAGNOSIS — E11.9 TYPE 2 DIABETES MELLITUS WITHOUT COMPLICATION, WITHOUT LONG-TERM CURRENT USE OF INSULIN (HCC): Primary | ICD-10-CM

## 2019-08-07 DIAGNOSIS — Z23 NEED FOR PNEUMOCOCCAL VACCINE: ICD-10-CM

## 2019-08-07 LAB
ALBUMIN SERPL-MCNC: 4.3 G/DL (ref 3.5–5.2)
ALP BLD-CCNC: 80 U/L (ref 35–104)
ALT SERPL-CCNC: 18 U/L (ref 0–32)
ANION GAP SERPL CALCULATED.3IONS-SCNC: 18 MMOL/L (ref 7–16)
AST SERPL-CCNC: 23 U/L (ref 0–31)
BASOPHILS ABSOLUTE: 0.05 E9/L (ref 0–0.2)
BASOPHILS RELATIVE PERCENT: 1.1 % (ref 0–2)
BILIRUB SERPL-MCNC: 0.8 MG/DL (ref 0–1.2)
BUN BLDV-MCNC: 19 MG/DL (ref 8–23)
CALCIUM SERPL-MCNC: 10 MG/DL (ref 8.6–10.2)
CHLORIDE BLD-SCNC: 104 MMOL/L (ref 98–107)
CHOLESTEROL, TOTAL: 312 MG/DL (ref 0–199)
CO2: 18 MMOL/L (ref 22–29)
CREAT SERPL-MCNC: 0.9 MG/DL (ref 0.5–1)
EOSINOPHILS ABSOLUTE: 0.1 E9/L (ref 0.05–0.5)
EOSINOPHILS RELATIVE PERCENT: 2.2 % (ref 0–6)
GFR AFRICAN AMERICAN: >60
GFR NON-AFRICAN AMERICAN: >60 ML/MIN/1.73
GLUCOSE BLD-MCNC: 174 MG/DL (ref 74–99)
HBA1C MFR BLD: 7.8 %
HCT VFR BLD CALC: 51.5 % (ref 34–48)
HDLC SERPL-MCNC: 40 MG/DL
HEMOGLOBIN: 16.2 G/DL (ref 11.5–15.5)
IMMATURE GRANULOCYTES #: 0.01 E9/L
IMMATURE GRANULOCYTES %: 0.2 % (ref 0–5)
LDL CHOLESTEROL CALCULATED: 205 MG/DL (ref 0–99)
LYMPHOCYTES ABSOLUTE: 2.16 E9/L (ref 1.5–4)
LYMPHOCYTES RELATIVE PERCENT: 47.9 % (ref 20–42)
MCH RBC QN AUTO: 27.1 PG (ref 26–35)
MCHC RBC AUTO-ENTMCNC: 31.5 % (ref 32–34.5)
MCV RBC AUTO: 86.3 FL (ref 80–99.9)
MONOCYTES ABSOLUTE: 0.54 E9/L (ref 0.1–0.95)
MONOCYTES RELATIVE PERCENT: 12 % (ref 2–12)
NEUTROPHILS ABSOLUTE: 1.65 E9/L (ref 1.8–7.3)
NEUTROPHILS RELATIVE PERCENT: 36.6 % (ref 43–80)
PDW BLD-RTO: 14.5 FL (ref 11.5–15)
PLATELET # BLD: 249 E9/L (ref 130–450)
PMV BLD AUTO: 11.8 FL (ref 7–12)
POTASSIUM SERPL-SCNC: 4.9 MMOL/L (ref 3.5–5)
RBC # BLD: 5.97 E12/L (ref 3.5–5.5)
SODIUM BLD-SCNC: 140 MMOL/L (ref 132–146)
TOTAL PROTEIN: 7.7 G/DL (ref 6.4–8.3)
TRIGL SERPL-MCNC: 334 MG/DL (ref 0–149)
TSH SERPL DL<=0.05 MIU/L-ACNC: 2.81 UIU/ML (ref 0.27–4.2)
VLDLC SERPL CALC-MCNC: 67 MG/DL
WBC # BLD: 4.5 E9/L (ref 4.5–11.5)

## 2019-08-07 PROCEDURE — 84443 ASSAY THYROID STIM HORMONE: CPT

## 2019-08-07 PROCEDURE — 90471 IMMUNIZATION ADMIN: CPT | Performed by: FAMILY MEDICINE

## 2019-08-07 PROCEDURE — 80053 COMPREHEN METABOLIC PANEL: CPT

## 2019-08-07 PROCEDURE — 80061 LIPID PANEL: CPT

## 2019-08-07 PROCEDURE — 83036 HEMOGLOBIN GLYCOSYLATED A1C: CPT | Performed by: FAMILY MEDICINE

## 2019-08-07 PROCEDURE — 99214 OFFICE O/P EST MOD 30 MIN: CPT | Performed by: FAMILY MEDICINE

## 2019-08-07 PROCEDURE — 85025 COMPLETE CBC W/AUTO DIFF WBC: CPT

## 2019-08-07 PROCEDURE — 90732 PPSV23 VACC 2 YRS+ SUBQ/IM: CPT | Performed by: FAMILY MEDICINE

## 2019-08-07 RX ORDER — METFORMIN HYDROCHLORIDE 500 MG/1
500 TABLET, EXTENDED RELEASE ORAL
Qty: 90 TABLET | Refills: 1 | Status: SHIPPED | OUTPATIENT
Start: 2019-08-07

## 2019-08-07 RX ORDER — ZOLPIDEM TARTRATE 12.5 MG/1
12.5 TABLET, FILM COATED, EXTENDED RELEASE ORAL NIGHTLY PRN
Qty: 90 TABLET | Refills: 0 | Status: SHIPPED | OUTPATIENT
Start: 2019-08-07 | End: 2019-09-17 | Stop reason: SDUPTHER

## 2019-08-07 ASSESSMENT — ENCOUNTER SYMPTOMS
BLOOD IN STOOL: 0
ABDOMINAL PAIN: 0
CONSTIPATION: 0
SHORTNESS OF BREATH: 0
EYE REDNESS: 0
VOMITING: 0
COLOR CHANGE: 0
CHEST TIGHTNESS: 0
BACK PAIN: 0
DIARRHEA: 0
NAUSEA: 0
COUGH: 0
WHEEZING: 0

## 2019-08-20 ENCOUNTER — TELEPHONE (OUTPATIENT)
Dept: PRIMARY CARE CLINIC | Age: 64
End: 2019-08-20

## 2019-08-20 NOTE — TELEPHONE ENCOUNTER
Cholesterol is well above where she should be, needs to trial zetia if she is agreeable to lower her risk for MI/CVA

## 2019-08-22 ENCOUNTER — TELEPHONE (OUTPATIENT)
Dept: FAMILY MEDICINE CLINIC | Age: 64
End: 2019-08-22

## 2019-08-22 NOTE — TELEPHONE ENCOUNTER
Patient called states PCP wanted to start patient on a cholesterol medication, patient is ok to start on medication please send to patients pharmacy

## 2019-08-23 RX ORDER — EZETIMIBE 10 MG/1
10 TABLET ORAL DAILY
Qty: 30 TABLET | Refills: 3 | Status: SHIPPED | OUTPATIENT
Start: 2019-08-23

## 2019-09-05 ENCOUNTER — HOSPITAL ENCOUNTER (OUTPATIENT)
Age: 64
Setting detail: OUTPATIENT SURGERY
Discharge: HOME | End: 2019-09-05
Attending: PHYSICAL MEDICINE & REHABILITATION | Admitting: PHYSICAL MEDICINE & REHABILITATION
Payer: COMMERCIAL

## 2019-09-05 ENCOUNTER — HOSPITAL ENCOUNTER (OUTPATIENT)
Dept: OPERATING ROOM | Age: 64
Setting detail: OUTPATIENT SURGERY
Discharge: HOME | End: 2019-09-05
Attending: PHYSICAL MEDICINE & REHABILITATION
Payer: COMMERCIAL

## 2019-09-05 VITALS
OXYGEN SATURATION: 95 % | RESPIRATION RATE: 16 BRPM | SYSTOLIC BLOOD PRESSURE: 154 MMHG | DIASTOLIC BLOOD PRESSURE: 85 MMHG | HEART RATE: 83 BPM

## 2019-09-05 DIAGNOSIS — M51.9 LUMBAR DISC DISEASE: ICD-10-CM

## 2019-09-05 PROCEDURE — 6360000002 HC RX W HCPCS: Performed by: PHYSICAL MEDICINE & REHABILITATION

## 2019-09-05 PROCEDURE — 3209999900 FLUORO FOR SURGICAL PROCEDURES

## 2019-09-05 PROCEDURE — 7100000010 HC PHASE II RECOVERY - FIRST 15 MIN: Performed by: PHYSICAL MEDICINE & REHABILITATION

## 2019-09-05 PROCEDURE — 6360000004 HC RX CONTRAST MEDICATION: Performed by: PHYSICAL MEDICINE & REHABILITATION

## 2019-09-05 PROCEDURE — 7100000011 HC PHASE II RECOVERY - ADDTL 15 MIN: Performed by: PHYSICAL MEDICINE & REHABILITATION

## 2019-09-05 PROCEDURE — 64483 NJX AA&/STRD TFRM EPI L/S 1: CPT | Performed by: PHYSICAL MEDICINE & REHABILITATION

## 2019-09-05 PROCEDURE — 3600000005 HC SURGERY LEVEL 5 BASE: Performed by: PHYSICAL MEDICINE & REHABILITATION

## 2019-09-05 PROCEDURE — 2709999900 HC NON-CHARGEABLE SUPPLY: Performed by: PHYSICAL MEDICINE & REHABILITATION

## 2019-09-05 PROCEDURE — 2500000003 HC RX 250 WO HCPCS: Performed by: PHYSICAL MEDICINE & REHABILITATION

## 2019-09-05 PROCEDURE — 2580000003 HC RX 258: Performed by: PHYSICAL MEDICINE & REHABILITATION

## 2019-09-05 RX ORDER — LIDOCAINE HYDROCHLORIDE 10 MG/ML
INJECTION, SOLUTION EPIDURAL; INFILTRATION; INTRACAUDAL; PERINEURAL PRN
Status: DISCONTINUED | OUTPATIENT
Start: 2019-09-05 | End: 2019-09-05 | Stop reason: ALTCHOICE

## 2019-09-05 ASSESSMENT — PAIN SCALES - GENERAL
PAINLEVEL_OUTOF10: 0
PAINLEVEL_OUTOF10: 0

## 2019-09-17 DIAGNOSIS — F51.01 PRIMARY INSOMNIA: ICD-10-CM

## 2019-09-20 RX ORDER — ZOLPIDEM TARTRATE 12.5 MG/1
12.5 TABLET, FILM COATED, EXTENDED RELEASE ORAL NIGHTLY PRN
Qty: 90 TABLET | Refills: 1 | Status: SHIPPED | OUTPATIENT
Start: 2019-09-20 | End: 2019-12-19

## 2019-12-13 ENCOUNTER — HOSPITAL ENCOUNTER (OUTPATIENT)
Age: 64
Discharge: HOME | End: 2019-12-15

## 2019-12-13 PROCEDURE — 88305 TISSUE EXAM BY PATHOLOGIST: CPT

## 2020-02-13 ENCOUNTER — APPOINTMENT (OUTPATIENT)
Dept: CT IMAGING | Age: 65
DRG: 069 | End: 2020-02-13
Payer: COMMERCIAL

## 2020-02-13 ENCOUNTER — APPOINTMENT (OUTPATIENT)
Dept: ULTRASOUND IMAGING | Age: 65
DRG: 069 | End: 2020-02-13
Payer: COMMERCIAL

## 2020-02-13 ENCOUNTER — HOSPITAL ENCOUNTER (INPATIENT)
Age: 65
LOS: 1 days | Discharge: HOME | DRG: 069 | End: 2020-02-14
Attending: FAMILY MEDICINE | Admitting: FAMILY MEDICINE
Payer: COMMERCIAL

## 2020-02-13 ENCOUNTER — APPOINTMENT (OUTPATIENT)
Dept: MRI IMAGING | Age: 65
DRG: 069 | End: 2020-02-13
Payer: COMMERCIAL

## 2020-02-13 PROBLEM — R41.82 AMS (ALTERED MENTAL STATUS): Status: ACTIVE | Noted: 2020-02-13

## 2020-02-13 LAB
ALBUMIN SERPL-MCNC: 4.2 G/DL (ref 3.5–5.2)
ALP BLD-CCNC: 75 U/L (ref 35–104)
ALT SERPL-CCNC: 20 U/L (ref 0–32)
ANION GAP SERPL CALCULATED.3IONS-SCNC: 11 MMOL/L (ref 7–16)
APTT: 27.2 SEC (ref 24.5–35.1)
AST SERPL-CCNC: 20 U/L (ref 0–31)
BASOPHILS ABSOLUTE: 0.04 E9/L (ref 0–0.2)
BASOPHILS RELATIVE PERCENT: 1 % (ref 0–2)
BILIRUB SERPL-MCNC: 0.7 MG/DL (ref 0–1.2)
BILIRUBIN URINE: NEGATIVE
BLOOD, URINE: NEGATIVE
BUN BLDV-MCNC: 20 MG/DL (ref 8–23)
CALCIUM SERPL-MCNC: 9.3 MG/DL (ref 8.6–10.2)
CHLORIDE BLD-SCNC: 105 MMOL/L (ref 98–107)
CHP ED QC CHECK: YES
CLARITY: CLEAR
CO2: 25 MMOL/L (ref 22–29)
COLOR: YELLOW
CREAT SERPL-MCNC: 0.8 MG/DL (ref 0.5–1)
D DIMER: <200 NG/ML DDU
EKG ATRIAL RATE: 79 BPM
EKG P AXIS: 44 DEGREES
EKG P-R INTERVAL: 172 MS
EKG Q-T INTERVAL: 382 MS
EKG QRS DURATION: 90 MS
EKG QTC CALCULATION (BAZETT): 438 MS
EKG R AXIS: -20 DEGREES
EKG T AXIS: 32 DEGREES
EKG VENTRICULAR RATE: 79 BPM
EOSINOPHILS ABSOLUTE: 0.11 E9/L (ref 0.05–0.5)
EOSINOPHILS RELATIVE PERCENT: 2.9 % (ref 0–6)
GFR AFRICAN AMERICAN: >60
GFR NON-AFRICAN AMERICAN: >60 ML/MIN/1.73
GLUCOSE BLD-MCNC: 206 MG/DL
GLUCOSE BLD-MCNC: 217 MG/DL (ref 74–99)
GLUCOSE URINE: >=1000 MG/DL
HCT VFR BLD CALC: 46 % (ref 34–48)
HEMOGLOBIN: 14.5 G/DL (ref 11.5–15.5)
IMMATURE GRANULOCYTES #: 0.01 E9/L
IMMATURE GRANULOCYTES %: 0.3 % (ref 0–5)
INR BLD: 0.9
KETONES, URINE: NEGATIVE MG/DL
LEUKOCYTE ESTERASE, URINE: NEGATIVE
LYMPHOCYTES ABSOLUTE: 1.71 E9/L (ref 1.5–4)
LYMPHOCYTES RELATIVE PERCENT: 44.5 % (ref 20–42)
MCH RBC QN AUTO: 27.2 PG (ref 26–35)
MCHC RBC AUTO-ENTMCNC: 31.5 % (ref 32–34.5)
MCV RBC AUTO: 86.1 FL (ref 80–99.9)
METER GLUCOSE: 131 MG/DL (ref 74–99)
METER GLUCOSE: 149 MG/DL (ref 74–99)
METER GLUCOSE: 206 MG/DL (ref 74–99)
MONOCYTES ABSOLUTE: 0.38 E9/L (ref 0.1–0.95)
MONOCYTES RELATIVE PERCENT: 9.9 % (ref 2–12)
NEUTROPHILS ABSOLUTE: 1.59 E9/L (ref 1.8–7.3)
NEUTROPHILS RELATIVE PERCENT: 41.4 % (ref 43–80)
NITRITE, URINE: NEGATIVE
PDW BLD-RTO: 14.4 FL (ref 11.5–15)
PH UA: 5 (ref 5–9)
PLATELET # BLD: 232 E9/L (ref 130–450)
PMV BLD AUTO: 10.9 FL (ref 7–12)
POTASSIUM SERPL-SCNC: 4.5 MMOL/L (ref 3.5–5)
PROTEIN UA: NEGATIVE MG/DL
PROTHROMBIN TIME: 9.7 SEC (ref 9.3–12.4)
RBC # BLD: 5.34 E12/L (ref 3.5–5.5)
SODIUM BLD-SCNC: 141 MMOL/L (ref 132–146)
SPECIFIC GRAVITY UA: 1.02 (ref 1–1.03)
TOTAL PROTEIN: 6.9 G/DL (ref 6.4–8.3)
TROPONIN: <0.01 NG/ML (ref 0–0.03)
UROBILINOGEN, URINE: 0.2 E.U./DL
WBC # BLD: 3.8 E9/L (ref 4.5–11.5)

## 2020-02-13 PROCEDURE — 80053 COMPREHEN METABOLIC PANEL: CPT

## 2020-02-13 PROCEDURE — G0378 HOSPITAL OBSERVATION PER HR: HCPCS

## 2020-02-13 PROCEDURE — 87040 BLOOD CULTURE FOR BACTERIA: CPT

## 2020-02-13 PROCEDURE — 87088 URINE BACTERIA CULTURE: CPT

## 2020-02-13 PROCEDURE — 36415 COLL VENOUS BLD VENIPUNCTURE: CPT

## 2020-02-13 PROCEDURE — 70450 CT HEAD/BRAIN W/O DYE: CPT

## 2020-02-13 PROCEDURE — 85025 COMPLETE CBC W/AUTO DIFF WBC: CPT

## 2020-02-13 PROCEDURE — 85378 FIBRIN DEGRADE SEMIQUANT: CPT

## 2020-02-13 PROCEDURE — 6370000000 HC RX 637 (ALT 250 FOR IP): Performed by: FAMILY MEDICINE

## 2020-02-13 PROCEDURE — 93005 ELECTROCARDIOGRAM TRACING: CPT | Performed by: FAMILY MEDICINE

## 2020-02-13 PROCEDURE — 82962 GLUCOSE BLOOD TEST: CPT

## 2020-02-13 PROCEDURE — 84484 ASSAY OF TROPONIN QUANT: CPT

## 2020-02-13 PROCEDURE — 70551 MRI BRAIN STEM W/O DYE: CPT

## 2020-02-13 PROCEDURE — 2060000000 HC ICU INTERMEDIATE R&B

## 2020-02-13 PROCEDURE — 93880 EXTRACRANIAL BILAT STUDY: CPT

## 2020-02-13 PROCEDURE — 99285 EMERGENCY DEPT VISIT HI MDM: CPT

## 2020-02-13 PROCEDURE — 85730 THROMBOPLASTIN TIME PARTIAL: CPT

## 2020-02-13 PROCEDURE — 81003 URINALYSIS AUTO W/O SCOPE: CPT

## 2020-02-13 PROCEDURE — 85610 PROTHROMBIN TIME: CPT

## 2020-02-13 RX ORDER — EZETIMIBE 10 MG/1
10 TABLET ORAL DAILY
Status: DISCONTINUED | OUTPATIENT
Start: 2020-02-13 | End: 2020-02-14 | Stop reason: HOSPADM

## 2020-02-13 RX ORDER — GABAPENTIN 600 MG/1
TABLET ORAL
Qty: 90 TABLET | Refills: 2 | Status: SHIPPED
Start: 2020-02-13 | End: 2020-11-02

## 2020-02-13 RX ORDER — DEXTROSE MONOHYDRATE 50 MG/ML
100 INJECTION, SOLUTION INTRAVENOUS PRN
Status: DISCONTINUED | OUTPATIENT
Start: 2020-02-13 | End: 2020-02-14 | Stop reason: HOSPADM

## 2020-02-13 RX ORDER — ZOLPIDEM TARTRATE 12.5 MG/1
12.5 TABLET, FILM COATED, EXTENDED RELEASE ORAL NIGHTLY PRN
COMMUNITY

## 2020-02-13 RX ORDER — NICOTINE POLACRILEX 4 MG
15 LOZENGE BUCCAL PRN
Status: DISCONTINUED | OUTPATIENT
Start: 2020-02-13 | End: 2020-02-14 | Stop reason: HOSPADM

## 2020-02-13 RX ORDER — GABAPENTIN 600 MG/1
600 TABLET ORAL 3 TIMES DAILY
COMMUNITY
End: 2020-02-13

## 2020-02-13 RX ORDER — ZOLPIDEM TARTRATE 12.5 MG/1
12.5 TABLET, FILM COATED, EXTENDED RELEASE ORAL NIGHTLY PRN
Status: DISCONTINUED | OUTPATIENT
Start: 2020-02-13 | End: 2020-02-14 | Stop reason: HOSPADM

## 2020-02-13 RX ORDER — ZOLPIDEM TARTRATE 5 MG/1
12.5 TABLET ORAL NIGHTLY
Status: DISCONTINUED | OUTPATIENT
Start: 2020-02-13 | End: 2020-02-13 | Stop reason: CLARIF

## 2020-02-13 RX ORDER — LORAZEPAM 0.5 MG/1
0.5 TABLET ORAL NIGHTLY
Status: DISCONTINUED | OUTPATIENT
Start: 2020-02-13 | End: 2020-02-13

## 2020-02-13 RX ORDER — GABAPENTIN 300 MG/1
600 CAPSULE ORAL DAILY
Status: DISCONTINUED | OUTPATIENT
Start: 2020-02-13 | End: 2020-02-14 | Stop reason: HOSPADM

## 2020-02-13 RX ORDER — DEXTROSE MONOHYDRATE 25 G/50ML
12.5 INJECTION, SOLUTION INTRAVENOUS PRN
Status: DISCONTINUED | OUTPATIENT
Start: 2020-02-13 | End: 2020-02-14 | Stop reason: HOSPADM

## 2020-02-13 RX ADMIN — INSULIN LISPRO 1 UNITS: 100 INJECTION, SOLUTION INTRAVENOUS; SUBCUTANEOUS at 20:17

## 2020-02-13 RX ADMIN — ZOLPIDEM TARTRATE 12.5 MG: 12.5 TABLET, FILM COATED, EXTENDED RELEASE ORAL at 21:26

## 2020-02-13 ASSESSMENT — PAIN SCALES - GENERAL
PAINLEVEL_OUTOF10: 0
PAINLEVEL_OUTOF10: 0

## 2020-02-13 NOTE — ED NOTES
Skin prepped with alcohol for 30 seconds. The top of blood culture bottles cleaned with alcohol,  Patients skin scrubbed for 30 seconds using clor-prep, aseptic technique utilized to obtain cultures. Patient tolerated well. Blood cultures sent to lab for processing. All blood and urine collected and sent to lab.      Svetlana Barker RN  02/13/20 0598

## 2020-02-13 NOTE — ED PROVIDER NOTES
Normocephalic and atraumatic  Eyes: PERRL, EOMI  Mouth: Oropharynx clear, handling secretions, no trismus  Neck: Supple, full ROM, non tender to palpation in the midline, no stridor, no crepitus, no meningeal signs  Pulmonary: Lungs clear to auscultation bilaterally, no wheezes, rales, or rhonchi. Not in respiratory distress  Cardiovascular:  Regular rate. Regular rhythm. No murmurs, gallops, or rubs. 2+ distal pulses  Chest: no chest wall tenderness  Abdomen: Soft. Non tender. Non distended. +BS. No rebound, guarding, or rigidity. No pulsatile masses appreciated. Musculoskeletal: Moves all extremities x 4. Warm and well perfused, no clubbing, cyanosis, or edema. Capillary refill <3 seconds  Skin: warm and dry. No rashes. Neurologic: GCS 15, CN 2-12 grossly intact, no focal deficits, symmetric strength 5/5 in the upper and lower extremities bilaterally  Psych: Normal Affect    NIH Stroke Scale/Score at time of initial evaluation:  1A: Level of Consciousness 0 - alert; keenly responsive   1B: Ask Month and Age 0 - answers both questions correctly   1C:  Tell Patient To Open and Close Eyes, then Hand  Squeeze 0 - performs both tasks correctly   2: Test Horizontal Extraocular Movements 0 - normal   3: Test Visual Fields 0 - no visual loss   4: Test Facial Palsy 0 - normal symmetric movement   5A: Test Left Arm Motor Drift 0 - no drift, limb holds 90 (or 45) degrees for full 10 seconds   5B: Test Right Arm Motor Drift 0 - no drift, limb holds 90 (or 45) degrees for full 10 seconds   6A: Test Left Leg Motor Drift 0 - no drift; leg holds 30 degree position for full 5 seconds   6B: Test Right Leg Motor Drift 0 - no drift; leg holds 30 degree position for full 5 seconds   7: Test Limb Ataxia   (FNF/Heel-Shin) 0 - absent   8: Test Sensation 0 - normal; no sensory loss   9: Test Language/Aphasia 0 - no aphasia, normal   10: Test Dysarthria 0 - normal   11: Test Extinction/Inattention 0 - no abnormality   Total Score: nonspecific changes  T Waves: non specific changes    Clinical Impression: Normal sinus rhythm possible left atrial enlargement septal inferior changes undetermined  Comparison to prior EKG: changes compared to previous EKG      ------------------------- NURSING NOTES AND VITALS REVIEWED ---------------------------   The nursing notes within the ED encounter and vital signs as below have been reviewed by myself. /74   Pulse 76   Temp 97.7 °F (36.5 °C) (Oral)   Resp 18   Ht 5' 5\" (1.651 m)   Wt 173 lb (78.5 kg)   SpO2 98%   BMI 28.79 kg/m²   Oxygen Saturation Interpretation: Normal    The patients available past medical records and past encounters were reviewed. ------------------------------ ED COURSE/MEDICAL DECISION MAKING----------------------  Medications - No data to display          Medical Decision Making:    Patient is oriented cerebellar functions normal gait is normal however because of the situation symptoms and fall admission was advised may need MRI and neurology consult. Re-Evaluations:             Re-evaluation. 11:10 Patients symptoms resolved no confusion no ataxia no focal neurological findings      Consultations:             Hospitalist Dr. Marylene Esters            This patient's ED course included: re-evaluation prior to disposition, cardiac monitoring, continuous pulse oximetry, complex medical decision making and emergency management and a personal history and physicial eaxmination    This patient has remained hemodynamically stable, improved and been closely monitored during their ED course. Counseling: The emergency provider has spoken with the patient and spouse/SO and discussed todays results, in addition to providing specific details for the plan of care and counseling regarding the diagnosis and prognosis.   Questions are answered at this time and they are agreeable with the plan.       --------------------------------- IMPRESSION AND DISPOSITION

## 2020-02-13 NOTE — TELEPHONE ENCOUNTER
Called and spoke with the patient to inform her that her pharmacy sent a refill request for Gabapentin and the physician refill the script.

## 2020-02-14 VITALS
SYSTOLIC BLOOD PRESSURE: 126 MMHG | HEIGHT: 65 IN | RESPIRATION RATE: 18 BRPM | TEMPERATURE: 98.7 F | DIASTOLIC BLOOD PRESSURE: 71 MMHG | WEIGHT: 169.5 LBS | OXYGEN SATURATION: 98 % | HEART RATE: 58 BPM | BODY MASS INDEX: 28.24 KG/M2

## 2020-02-14 PROBLEM — R42 DIZZINESS: Status: ACTIVE | Noted: 2020-02-14

## 2020-02-14 LAB
METER GLUCOSE: 115 MG/DL (ref 74–99)
METER GLUCOSE: 126 MG/DL (ref 74–99)

## 2020-02-14 PROCEDURE — G0378 HOSPITAL OBSERVATION PER HR: HCPCS

## 2020-02-14 PROCEDURE — 6370000000 HC RX 637 (ALT 250 FOR IP): Performed by: FAMILY MEDICINE

## 2020-02-14 PROCEDURE — 97165 OT EVAL LOW COMPLEX 30 MIN: CPT

## 2020-02-14 PROCEDURE — 82962 GLUCOSE BLOOD TEST: CPT

## 2020-02-14 RX ORDER — ASPIRIN 81 MG/1
81 TABLET, CHEWABLE ORAL DAILY
Status: DISCONTINUED | OUTPATIENT
Start: 2020-02-14 | End: 2020-02-14 | Stop reason: HOSPADM

## 2020-02-14 RX ORDER — LIDOCAINE 4 G/G
1 PATCH TOPICAL DAILY
Status: DISCONTINUED | OUTPATIENT
Start: 2020-02-14 | End: 2020-02-14

## 2020-02-14 RX ORDER — ASPIRIN 81 MG/1
81 TABLET, CHEWABLE ORAL DAILY
Qty: 30 TABLET | Refills: 3 | Status: SHIPPED | OUTPATIENT
Start: 2020-02-15

## 2020-02-14 RX ADMIN — EZETIMIBE 10 MG: 10 TABLET ORAL at 09:00

## 2020-02-14 RX ADMIN — ASPIRIN 81 MG 81 MG: 81 TABLET ORAL at 09:00

## 2020-02-14 ASSESSMENT — ENCOUNTER SYMPTOMS
SHORTNESS OF BREATH: 0
ABDOMINAL PAIN: 0
COLOR CHANGE: 0

## 2020-02-14 ASSESSMENT — PAIN SCALES - GENERAL
PAINLEVEL_OUTOF10: 0

## 2020-02-14 NOTE — PLAN OF CARE
Problem: Falls - Risk of:  Goal: Will remain free from falls  Description  Will remain free from falls  2/14/2020 0237 by Katie Ndiaye RN  Outcome: Met This Shift  2/13/2020 1829 by Horace Barr RN  Outcome: Met This Shift  Goal: Absence of physical injury  Description  Absence of physical injury  2/14/2020 0237 by Katie Ndiaye RN  Outcome: Met This Shift  2/13/2020 1829 by Horace Barr RN  Outcome: Met This Shift

## 2020-02-14 NOTE — H&P
Wendy Black MD        ezetimibe (ZETIA) tablet 10 mg  10 mg Oral Daily Javy Boyce MD        gabapentin (NEURONTIN) capsule 600 mg  600 mg Oral Daily Javy Boyce MD        insulin lispro (HUMALOG) injection vial 0-6 Units  0-6 Units Subcutaneous TID [facility] Javy Boyce MD        insulin lispro (HUMALOG) injection vial 0-3 Units  0-3 Units Subcutaneous Nightly Javy Boyce MD   1 Units at 02/13/20 2017    enoxaparin (LOVENOX) injection 40 mg  40 mg Subcutaneous Daily Javy Boyce MD        glucose (GLUTOSE) 40 % oral gel 15 g  15 g Oral PRN Javy Boyce MD        dextrose 50 % IV solution  12.5 g Intravenous PRN Javy Boyce MD        glucagon (rDNA) injection 1 mg  1 mg Intramuscular PRN Javy Boyce MD        dextrose 5 % solution  100 mL/hr Intravenous PRSERGIO Boyce MD        zolpidem [facility] CR) extended release tablet 12.5 mg  12.5 mg Oral Nightly PRSERGIO Boyce MD   12.5 mg at 02/13/20 2126     Allergies: Allergies   Allergen Reactions    Statins Other (See Comments)     Unable to tolerate GI symptoms  Lipitor and Crestor       Principal Problem:    Dizziness  Active Problems:    AMS (altered mental status)  Resolved Problems:    * No resolved hospital problems. *    Blood pressure 132/63, pulse 58, temperature 97.4 °F (36.3 °C), temperature source Temporal, resp. rate 19, height 5' 5\" (1.651 m), weight 169 lb 8 oz (76.9 kg), SpO2 94 %, not currently breastfeeding. Review of Systems   Constitutional: Positive for activity change. HENT: Negative for congestion. Respiratory: Negative for shortness of breath. Cardiovascular: Negative for chest pain. Gastrointestinal: Negative for abdominal pain. Genitourinary: Negative for difficulty urinating. Musculoskeletal: Positive for gait problem. Skin: Negative for color change. Neurological: Negative for dizziness.    Hematological: Negative for adenopathy. Psychiatric/Behavioral: Negative for agitation. Physical Exam  Vitals signs reviewed. HENT:      Head: Normocephalic. Eyes:      Pupils: Pupils are equal, round, and reactive to light. Neck:      Musculoskeletal: Normal range of motion and neck supple. Cardiovascular:      Rate and Rhythm: Normal rate and regular rhythm. Pulses: Normal pulses. Heart sounds: Normal heart sounds. Pulmonary:      Effort: Pulmonary effort is normal. No respiratory distress. Breath sounds: Normal breath sounds. No wheezing. Abdominal:      General: Abdomen is flat. Bowel sounds are normal. There is no distension. Tenderness: There is no abdominal tenderness. Skin:     General: Skin is warm and dry. Capillary Refill: Capillary refill takes less than 2 seconds. Neurological:      General: No focal deficit present. Mental Status: She is alert and oriented to person, place, and time. Cranial Nerves: No cranial nerve deficit. Coordination: Coordination normal.   Psychiatric:         Mood and Affect: Mood normal.         Behavior: Behavior normal.         Assessment:  Ataxia  History of small infarct  DM    Plan:  Carotids ok. MRI no acute issues. Intolerant of statins, will start ASA. Neurology to see.     Yudi Cronin MD  2/14/2020

## 2020-02-14 NOTE — DISCHARGE SUMMARY
Activity: activity as tolerated  Diet: cardiac diet  Wound Care: none needed    Follow-up with PCP in 1-2 weeks.     Signed:  Audi Sanchez MD  2/14/2020  1:03 PM

## 2020-02-14 NOTE — PROGRESS NOTES
OCCUPATIONAL THERAPY INITIAL EVALUATION      Date:2020  Patient Name: Raffaele Pack  MRN: [id number]  : 1955  Room: [address] , OTR/L #[id number]    AM-PAC Daily Activity Raw Score:   Recommended Adaptive Equipment: none    Diagnosis: AMS (altered mental status) [R41.82]  AMS (altered mental status) [R41.82]     Referring physician: Shirlee Sandifer, MD    Pertinent Medical History: PE, SHANTANU, DMII     Home Living: Pt lives with spouse in 2 floor home. 7 [address], 1 handrail  Bath/bed on 2nd floor - 14 stairs, 1 handrail. Half bath on 1st floor. Bathroom setup: tub/shower with grab bars. Walk in shower also available   Equipment owned: n/a    Prior Level of Function: independent with ADLs , independent with IADLs; ambulated independently w/o AD  Driving: yes  Occupation: works at [employer]    Pain Level: Pt c/o no pain this session    Cognition: A&O: 4/4; Follows 1-2 step directions   Memory:  good    Sequencing:  good    Problem solving:  good    Judgement/safety:  good      Functional Assessment:   Initial Eval Status  Date: 20 Treatment Status  Date: Short Term Goals/LTG  Treatment frequency: Evaluation Only   Feeding Independent      Grooming Independent      UB Dressing Independent      LB Dressing Modified [place]      Bathing Modified [place]     Toileting Independent      Bed Mobility  Rolling: Independent   Supine to sit:  Independent   Sit to supine: Independent      Functional Transfers Independent     Functional Mobility Independent, no AD     Balance Sitting: Independent  Standing: Independent, no AD     Activity Tolerance Good     Visual/  Perceptual Glasses: readers                Hand dominance: R   Strength ROM Additional Info:    RUE  5/5  WFL   good  and wfl FMC/dexterity noted during ADL tasks       LUE 5/5  WFL   good  and wfl FMC/dexterity noted during ADL tasks       Hearing: [place]/[employer] [facility]  Sensation:  No c/o numbness or tingling completed    Time In:10:12           Time Out:10:25               Treatment Charges: Mins Units   Ther Ex  00444     Manual Therapy Jesús Shelley [phone] [id number]     ADL/Home Mgt [id number]     Neuro Re-ed [id number]     Group Therapy      Orthotic manage/training  [id number]     Non-Billable Time     Total Timed Treatment 0 0         [place], OTR/L #[id number]

## 2020-02-14 NOTE — CONSULTS
History Of Present Illness:  Ac Argueta is a 60 y.o. female presenting to the ED for episode of ataxia short-term aphasia dizziness and confusion, beginning 12 hours ago. The complaint has been Improving, moderate in severity, and worsened by nothing. Patient did fall today but did not hit head did scrape her lower left leg anteriorly at the time. She denies any weakness numbness difficulty swallowing there is been no cough fever chills nausea or vomiting no chest pain no abdominal pain no visual defects.      ROS:   Pertinent positives and negatives are stated within HPI, all other systems reviewed and are negative.  --------------------------------------------- PAST HISTORY ---------------------------------------------  Past Medical History:  has a past medical history of Embolism ([facility] [address] Hx of blood clots, MAHIN (obstructive sleep apnea), and Type 2 diabetes mellitus without complication ([facility] [address]    Past Surgical History:  has a past surgical history that includes back surgery (2003); Hysterectomy (2002); Inguinal hernia repair (1961, 1962); Tonsillectomy (1975); Nose surgery (2008); eye surgery; Cataract removal; Nerve Block (Left, 03/07/2019); Anesthesia Nerve Block (Left, 3/7/2019); Nerve Block (Left, 03/28/2019); Anesthesia Nerve Block (Left, 3/28/2019); Nerve Block (Left, 09/05/2019); and epidural steroid injection (Left, 9/5/2019).    Social History:  reports that she has never smoked. She has never used smokeless tobacco. She reports current alcohol use. She reports that she does not use drugs.   as above per ed staff. Patient interviewed and reports her  concerned when he witnessed her stumbling yesterday am; she reports having poor memory of details. The patient is a 60 y.o. female with significant past medical history of mahin, dyslipidemia and type 2 diabetes who presents with above.       The patient has the following symptoms:    Change in level of consciousness: alert    New Weakness: past medical, surgical, social and family histories were reviewed and updated as appropriate. Review of Systems  A comprehensive review of systems was negative except for:       Objective:     Neuro exam 134/62 p 64 t 98  General: normal orientation and alertness. Cranial nerve testing was normal.  Funduscopic eye exam revealed not testable. Motor exam: 5-/5. Deep tendon reflexes were absent bilaterally. Plantar responses were flexor bilaterally. Cerebellar exam noted finger to nose without dysmetria. Sensation was decreased in the lower extremities. Assessment:   Transient altered mental status multifactorial including possible toxic effects of ambien in patient with known SHANTANU who does not wear tolerate O2 mask  Dizziness also multifactorial (and transient) including probable diabetic neuropathy, medication effect with mri suggesting remote cerebellar lacune  Carotid us negative   EKG sinus rhythm      Plan:   Agree with asa 81 mg qd and recommend opinion with endocrinologist regarding treatment of dyslipidemia (\"intolerant of statins\")  No further neurologic tests suggested at this time.   Thank you for consultation

## 2020-02-15 LAB — URINE CULTURE, ROUTINE: NORMAL

## 2020-02-18 LAB
BLOOD CULTURE, ROUTINE: NORMAL
CULTURE, BLOOD 2: NORMAL

## 2020-11-02 RX ORDER — GABAPENTIN 600 MG/1
TABLET ORAL
Qty: 90 TABLET | Refills: 0 | Status: SHIPPED | OUTPATIENT
Start: 2020-11-02 | End: 2020-12-02

## 2020-11-02 NOTE — TELEPHONE ENCOUNTER
Will approve one month supply. She will need an office visit for any further refills or can ask PCP to continue.

## 2020-11-02 NOTE — TELEPHONE ENCOUNTER
Patient last visit 7-9-19 no return visit scheduled. Pharmacy requesting refill on Gabapentin 600 mg 1 tab qhs sent 2-13-20 #90 2 refills. Please advise.

## 2020-11-04 NOTE — TELEPHONE ENCOUNTER
Called and left message on patient voicemail that the physician called in 1 month supply and she will either need to make a follow up appointment or see if PCP will continue to write the script.

## 2023-06-08 ENCOUNTER — OFFICE VISIT (OUTPATIENT)
Dept: ENT CLINIC | Age: 68
End: 2023-06-08
Payer: COMMERCIAL

## 2023-06-08 VITALS
BODY MASS INDEX: 28.29 KG/M2 | WEIGHT: 170 LBS | DIASTOLIC BLOOD PRESSURE: 90 MMHG | SYSTOLIC BLOOD PRESSURE: 160 MMHG | HEART RATE: 69 BPM

## 2023-06-08 DIAGNOSIS — J30.9 ALLERGIC RHINITIS, UNSPECIFIED SEASONALITY, UNSPECIFIED TRIGGER: ICD-10-CM

## 2023-06-08 DIAGNOSIS — J34.89 SINUS PRESSURE: ICD-10-CM

## 2023-06-08 DIAGNOSIS — R09.82 POST-NASAL DRAINAGE: ICD-10-CM

## 2023-06-08 DIAGNOSIS — R09.81 NASAL CONGESTION: Primary | ICD-10-CM

## 2023-06-08 PROCEDURE — 1123F ACP DISCUSS/DSCN MKR DOCD: CPT | Performed by: NURSE PRACTITIONER

## 2023-06-08 PROCEDURE — 99203 OFFICE O/P NEW LOW 30 MIN: CPT | Performed by: NURSE PRACTITIONER

## 2023-06-08 RX ORDER — ECHINACEA PURPUREA EXTRACT 125 MG
2 TABLET ORAL 4 TIMES DAILY
Qty: 3 EACH | Refills: 3 | Status: SHIPPED | OUTPATIENT
Start: 2023-06-08

## 2023-06-08 RX ORDER — AZELASTINE 1 MG/ML
1-2 SPRAY, METERED NASAL 2 TIMES DAILY PRN
Qty: 30 ML | Refills: 1 | Status: SHIPPED | OUTPATIENT
Start: 2023-06-08

## 2023-06-08 ASSESSMENT — ENCOUNTER SYMPTOMS
RESPIRATORY NEGATIVE: 1
SHORTNESS OF BREATH: 0
SINUS PAIN: 1
EYES NEGATIVE: 1
STRIDOR: 0
RHINORRHEA: 1
SINUS PRESSURE: 1

## 2023-06-08 NOTE — PROGRESS NOTES
[facility]  Dr. Lesia Sims D.O. Ms.Ed. New Consult       Patient Name:  Nedra Sarabia  :  1955     CHIEF C/O:    Chief Complaint   Patient presents with    New Patient     Pt states she gets nasal congestions which builds until she has a sinus infection. Pt states she had a Ct scan sinus which showed a nasal deviation. Pt states she snores at night and does not sleep really. Pt has sleep apnea but cannot wear the mask. Cpap does not help. HISTORY OBTAINED FROM:  patient    HISTORY OF PRESENT ILLNESS:       Radha Woods is a 74y.o. year old female, here today for:       Nasal congestion and recurrent sinus infections.   Symptoms for several year, worsening for past 2 year  Persistent nasal congestion, rhinorrhea and PND  Sinus headaches with pain and pressure  Does get pressure into the ears  Currently seen Dr. Shaun Schmitz, using mucinex and flonase, been using nasal spray intermittently  No recent fevers, last antibiotics for sinuses 1 month ago  Has had previous sinus surgery, Dr. Koby Soto  Had recent ct of the sinuses, no results available today  Has had allergy testing, only mold         Past Medical History:   Diagnosis Date    Embolism ([address]     pulmonary post op    Hx of blood clots     SHANTANU (obstructive sleep apnea)     Unable to tolerate CPAP    Type 2 diabetes mellitus without complication ([address] 1170     Past Surgical History:   Procedure Laterality Date    ANESTHESIA NERVE BLOCK Left 3/7/2019    LEFT L4-5 TRANSFORAMINAL EPIDURAL STEROID INJECTION performed by Danni Acharya DO at [facility] Left 3/28/2019    LEFT L4-5 TRANSFORAMINAL EPIDURAL STEROID INJECTION performed by Danni Acharya DO at [facility]      Lumbar, laminectomy, Dr. Leena Parmar Left 2019    LEFT L4-5 TRANSFORAMINAL EPIDURAL STEROID INJECTION performed by Danni Acharya DO

## 2023-07-13 RX ORDER — AZELASTINE HYDROCHLORIDE 137 UG/1
SPRAY, METERED NASAL
Refills: 1 | OUTPATIENT
Start: 2023-07-13

## 2023-07-27 ENCOUNTER — OFFICE VISIT (OUTPATIENT)
Dept: ENT CLINIC | Age: 68
End: 2023-07-27
Payer: COMMERCIAL

## 2023-07-27 VITALS
WEIGHT: 170.4 LBS | HEIGHT: 65 IN | SYSTOLIC BLOOD PRESSURE: 115 MMHG | HEART RATE: 71 BPM | DIASTOLIC BLOOD PRESSURE: 78 MMHG | BODY MASS INDEX: 28.39 KG/M2

## 2023-07-27 DIAGNOSIS — J30.9 ALLERGIC RHINITIS, UNSPECIFIED SEASONALITY, UNSPECIFIED TRIGGER: Primary | ICD-10-CM

## 2023-07-27 DIAGNOSIS — R09.81 NASAL CONGESTION: ICD-10-CM

## 2023-07-27 DIAGNOSIS — R09.82 POST-NASAL DRAINAGE: ICD-10-CM

## 2023-07-27 DIAGNOSIS — J34.89 SINUS PRESSURE: ICD-10-CM

## 2023-07-27 PROCEDURE — 99213 OFFICE O/P EST LOW 20 MIN: CPT | Performed by: NURSE PRACTITIONER

## 2023-07-27 PROCEDURE — 1123F ACP DISCUSS/DSCN MKR DOCD: CPT | Performed by: NURSE PRACTITIONER

## 2023-07-27 RX ORDER — AZELASTINE 1 MG/ML
1-2 SPRAY, METERED NASAL 2 TIMES DAILY PRN
Qty: 90 ML | Refills: 1 | Status: SHIPPED | OUTPATIENT
Start: 2023-07-27

## 2023-07-27 ASSESSMENT — ENCOUNTER SYMPTOMS
EYES NEGATIVE: 1
RHINORRHEA: 0
SINUS PAIN: 0
STRIDOR: 0
SINUS PRESSURE: 0
SHORTNESS OF BREATH: 0
RESPIRATORY NEGATIVE: 1

## 2023-07-27 NOTE — PROGRESS NOTES
continue with her Astelin spray, 1 to 2 sprays each nostril twice daily as needed with nasal saline spray, 2 sprays each nostril 3-4 times daily. She will follow-up again in 6 months for reevaluation. She will call for any new or worsening symptoms prior to her next appointment.       Damaso Purvis, MSN, FNP-C  [address] [place] [address], Nose and Throat    The information contained in this note has been dictated using drug and medical speech recognition software and may contain errors

## 2024-01-22 RX ORDER — AZELASTINE HYDROCHLORIDE 137 UG/1
SPRAY, METERED NASAL
Qty: 90 ML | Refills: 1 | Status: SHIPPED | OUTPATIENT
Start: 2024-01-22

## 2024-01-29 ENCOUNTER — OFFICE VISIT (OUTPATIENT)
Dept: ENT CLINIC | Age: 69
End: 2024-01-29
Payer: COMMERCIAL

## 2024-01-29 VITALS
WEIGHT: 173 LBS | BODY MASS INDEX: 28.82 KG/M2 | HEART RATE: 73 BPM | SYSTOLIC BLOOD PRESSURE: 129 MMHG | DIASTOLIC BLOOD PRESSURE: 82 MMHG | HEIGHT: 65 IN

## 2024-01-29 DIAGNOSIS — J34.89 SINUS PRESSURE: ICD-10-CM

## 2024-01-29 DIAGNOSIS — M26.623 BILATERAL TEMPOROMANDIBULAR JOINT PAIN: ICD-10-CM

## 2024-01-29 DIAGNOSIS — J30.9 ALLERGIC RHINITIS, UNSPECIFIED SEASONALITY, UNSPECIFIED TRIGGER: Primary | ICD-10-CM

## 2024-01-29 DIAGNOSIS — R09.81 NASAL CONGESTION: ICD-10-CM

## 2024-01-29 PROCEDURE — 1123F ACP DISCUSS/DSCN MKR DOCD: CPT | Performed by: NURSE PRACTITIONER

## 2024-01-29 PROCEDURE — 99213 OFFICE O/P EST LOW 20 MIN: CPT | Performed by: NURSE PRACTITIONER

## 2024-01-29 RX ORDER — ORAL SEMAGLUTIDE 3 MG/1
1 TABLET ORAL DAILY
COMMUNITY
Start: 2023-12-19 | End: 2024-01-18

## 2024-01-29 RX ORDER — CETIRIZINE HYDROCHLORIDE 10 MG/1
10 TABLET ORAL DAILY
Qty: 30 TABLET | Refills: 1 | Status: SHIPPED | OUTPATIENT
Start: 2024-01-29 | End: 2024-03-29

## 2024-01-29 ASSESSMENT — ENCOUNTER SYMPTOMS
SINUS PAIN: 0
RESPIRATORY NEGATIVE: 1
EYES NEGATIVE: 1
STRIDOR: 0
SINUS PRESSURE: 1
VOICE CHANGE: 1
RHINORRHEA: 0
SHORTNESS OF BREATH: 0

## 2024-01-29 NOTE — PROGRESS NOTES
[facility]  Dr. Hay Sims D.O. Ms.Ed        Patient Name:  Sallie Gao  :  1955     CHIEF C/O:    Chief Complaint   Patient presents with    Follow-up     6 mo sinus, after astelin, mentions since 2023 she has had periodic hoarseness, loss of voice, and Lt ear pain,        HISTORY OBTAINED FROM:  patient    HISTORY OF PRESENT ILLNESS:       Sallie is a 68 y.o. year old female, here today for follow up of:       Chronic allergy symptoms, sinus pressure, ear fullness.  Patient was last seen 6 months ago and has been using Flonase and Astelin daily with fluctuations in her symptoms over the last several months.  She complains of maxillary sinus pressure with left ear fullness.  She denies any significant congestion or rhinorrhea but does have some postnasal drainage and hoarseness intermittently.  She denies any recent fevers or recent antibiotics.  She denies any teeth clenching or grinding but does state increased stress over the past several months with her  being ill.  She denies any changes to her hearing or new tinnitus.           Past Medical History:   Diagnosis Date    Embolism ([facility])     pulmonary post op    Hx of blood clots     SHANTANU (obstructive sleep apnea)     Unable to tolerate CPAP    Type 2 diabetes mellitus without complication ([facility]) 2016     Past Surgical History:   Procedure Laterality Date    ANESTHESIA NERVE BLOCK Left 3/7/2019    LEFT L4-5 TRANSFORAMINAL EPIDURAL STEROID INJECTION performed by Zohreh Cazares DO at [facility] OR    ANESTHESIA NERVE BLOCK Left 3/28/2019    LEFT L4-5 TRANSFORAMINAL EPIDURAL STEROID INJECTION performed by Zohreh Cazares DO at [facility] OR    BACK SURGERY      Lumbar, laminectomy, Dr. Ross    CATARACT REMOVAL      EPIDURAL STEROID INJECTION Left 2019    LEFT L4-5 TRANSFORAMINAL EPIDURAL STEROID INJECTION performed by Zohreh Cazares DO at [facility] OR    EYE SURGERY

## 2024-03-11 ENCOUNTER — TELEPHONE (OUTPATIENT)
Dept: ENT CLINIC | Age: 69
End: 2024-03-11

## 2024-03-11 NOTE — TELEPHONE ENCOUNTER
Pt lm at office during after hours stating she needs to cancel her 3/11/24 [address] as something came up; she stated she will call back to reschedule.

## 2024-03-26 RX ORDER — CETIRIZINE HYDROCHLORIDE 10 MG/1
10 TABLET ORAL DAILY
Qty: 30 TABLET | Refills: 1 | OUTPATIENT
Start: 2024-03-26

## 2024-03-29 NOTE — PROGRESS NOTES
[facility] PRE-ADMISSION TESTING INSTRUCTIONS    The Preadmission Testing patient is instructed accordingly using the following criteria (check applicable):    ARRIVAL INSTRUCTIONS:  [x] Parking the day of Surgery is located in the Main Entrance lot.  Upon entering the door, make an immediate right to the surgery reception desk    [x] Bring photo ID and insurance card    [x] Bring in a copy of Living will or Durable Power of  papers.    [x] Please be sure to arrange for a responsible adult to provide transportation to and from the hospital    [x] Please arrange for a responsible adult to be with you for the 24 hour period post procedure due to having anesthesia    [x] If you awake am of surgery not feeling well or have temperature >100 please call [phone]    GENERAL INSTRUCTIONS:    [x] No solid foods after midnight, may have up to 8oz of water until 4 hours prior to surgery. No gum, no candy, no mints. NPO time: 2 hours prior to arrival time       [x] You may brush your teeth, do not swallow any toothpaste    [] Take medications as instructed     [x] No herbal supplements and vitamins 5 days prior to procedure    [x] Follow preop dosing of blood thinners per physician instructions    [] Take 1/2 dose of evening insulin, but no insulin after midnight    [x] No oral diabetic medications after midnight    [x] If diabetic and have low blood sugar or feel symptomatic, take 1-2oz apple juice only    [] Bring inhalers day of surgery    [] Bring urine specimen day of surgery    [x] Shower or bath with soap, lather and rinse well, AM of Surgery, no lotion, powders or creams to surgical site    [] Follow bowel prep as instructed per surgeon    [x] No tobacco products within 24 hours of surgery     [x] No alcohol or illegal drug use, marijuana included, within 24 hours of surgery.    [x] Jewelry, body piercing's, eyeglasses, contact lenses and dentures are not permitted into surgery

## 2024-04-04 ENCOUNTER — HOSPITAL ENCOUNTER (OUTPATIENT)
Age: 69
Setting detail: OUTPATIENT SURGERY
Discharge: HOME | End: 2024-04-04
Attending: OPHTHALMOLOGY | Admitting: OPHTHALMOLOGY
Payer: COMMERCIAL

## 2024-04-04 ENCOUNTER — ANESTHESIA (OUTPATIENT)
Dept: OPERATING ROOM | Age: 69
End: 2024-04-04
Payer: COMMERCIAL

## 2024-04-04 ENCOUNTER — ANESTHESIA EVENT (OUTPATIENT)
Dept: OPERATING ROOM | Age: 69
End: 2024-04-04
Payer: COMMERCIAL

## 2024-04-04 VITALS
HEIGHT: 65 IN | TEMPERATURE: 97.4 F | HEART RATE: 56 BPM | RESPIRATION RATE: 16 BRPM | OXYGEN SATURATION: 100 % | DIASTOLIC BLOOD PRESSURE: 66 MMHG | WEIGHT: 165 LBS | SYSTOLIC BLOOD PRESSURE: 115 MMHG | BODY MASS INDEX: 27.49 KG/M2

## 2024-04-04 LAB — GLUCOSE BLD-MCNC: 128 MG/DL (ref 74–99)

## 2024-04-04 PROCEDURE — 2500000003 HC RX 250 WO HCPCS: Performed by: OPHTHALMOLOGY

## 2024-04-04 PROCEDURE — 2580000003 HC RX 258: Performed by: NURSE ANESTHETIST, CERTIFIED REGISTERED

## 2024-04-04 PROCEDURE — 6370000000 HC RX 637 (ALT 250 FOR IP): Performed by: OPHTHALMOLOGY

## 2024-04-04 PROCEDURE — 7100000010 HC PHASE II RECOVERY - FIRST 15 MIN: Performed by: OPHTHALMOLOGY

## 2024-04-04 PROCEDURE — 3600000013 HC SURGERY LEVEL 3 ADDTL 15MIN: Performed by: OPHTHALMOLOGY

## 2024-04-04 PROCEDURE — 6360000002 HC RX W HCPCS: Performed by: OPHTHALMOLOGY

## 2024-04-04 PROCEDURE — 82962 GLUCOSE BLOOD TEST: CPT

## 2024-04-04 PROCEDURE — 7100000011 HC PHASE II RECOVERY - ADDTL 15 MIN: Performed by: OPHTHALMOLOGY

## 2024-04-04 PROCEDURE — 2709999900 HC NON-CHARGEABLE SUPPLY: Performed by: OPHTHALMOLOGY

## 2024-04-04 PROCEDURE — 3600000003 HC SURGERY LEVEL 3 BASE: Performed by: OPHTHALMOLOGY

## 2024-04-04 PROCEDURE — 3700000001 HC ADD 15 MINUTES (ANESTHESIA): Performed by: OPHTHALMOLOGY

## 2024-04-04 PROCEDURE — 2580000003 HC RX 258: Performed by: OPHTHALMOLOGY

## 2024-04-04 PROCEDURE — 6360000002 HC RX W HCPCS: Performed by: NURSE ANESTHETIST, CERTIFIED REGISTERED

## 2024-04-04 PROCEDURE — 3700000000 HC ANESTHESIA ATTENDED CARE: Performed by: OPHTHALMOLOGY

## 2024-04-04 PROCEDURE — 2720000010 HC SURG SUPPLY STERILE: Performed by: OPHTHALMOLOGY

## 2024-04-04 RX ORDER — INDOCYANINE GREEN AND WATER 25 MG
KIT INJECTION
Status: DISCONTINUED
Start: 2024-04-04 | End: 2024-04-04 | Stop reason: HOSPADM

## 2024-04-04 RX ORDER — CYCLOPENTOLATE HYDROCHLORIDE 10 MG/ML
1 SOLUTION/ DROPS OPHTHALMIC PRN
Status: DISCONTINUED | OUTPATIENT
Start: 2024-04-04 | End: 2024-04-04 | Stop reason: HOSPADM

## 2024-04-04 RX ORDER — SODIUM CHLORIDE 0.9 % (FLUSH) 0.9 %
5-40 SYRINGE (ML) INJECTION PRN
Status: CANCELLED | OUTPATIENT
Start: 2024-04-04

## 2024-04-04 RX ORDER — SODIUM CHLORIDE 0.9 % (FLUSH) 0.9 %
5-40 SYRINGE (ML) INJECTION EVERY 12 HOURS SCHEDULED
Status: DISCONTINUED | OUTPATIENT
Start: 2024-04-04 | End: 2024-04-04 | Stop reason: HOSPADM

## 2024-04-04 RX ORDER — TRIAMCINOLONE ACETONIDE 40 MG/ML
40 INJECTION, SUSPENSION INTRA-ARTICULAR; INTRAMUSCULAR ONCE
Status: DISCONTINUED | OUTPATIENT
Start: 2024-04-04 | End: 2024-04-04 | Stop reason: HOSPADM

## 2024-04-04 RX ORDER — PHENYLEPHRINE HYDROCHLORIDE 25 MG/ML
1 SOLUTION/ DROPS OPHTHALMIC PRN
Status: COMPLETED | OUTPATIENT
Start: 2024-04-04 | End: 2024-04-04

## 2024-04-04 RX ORDER — SODIUM CHLORIDE 9 MG/ML
INJECTION, SOLUTION INTRAVENOUS PRN
Status: DISCONTINUED | OUTPATIENT
Start: 2024-04-04 | End: 2024-04-04 | Stop reason: HOSPADM

## 2024-04-04 RX ORDER — DEXAMETHASONE SODIUM PHOSPHATE 10 MG/ML
INJECTION INTRAMUSCULAR; INTRAVENOUS PRN
Status: DISCONTINUED | OUTPATIENT
Start: 2024-04-04 | End: 2024-04-04 | Stop reason: ALTCHOICE

## 2024-04-04 RX ORDER — MIDAZOLAM HYDROCHLORIDE 1 MG/ML
INJECTION INTRAMUSCULAR; INTRAVENOUS PRN
Status: DISCONTINUED | OUTPATIENT
Start: 2024-04-04 | End: 2024-04-04 | Stop reason: SDUPTHER

## 2024-04-04 RX ORDER — FENTANYL CITRATE 50 UG/ML
INJECTION, SOLUTION INTRAMUSCULAR; INTRAVENOUS PRN
Status: DISCONTINUED | OUTPATIENT
Start: 2024-04-04 | End: 2024-04-04 | Stop reason: SDUPTHER

## 2024-04-04 RX ORDER — PROPARACAINE HYDROCHLORIDE 5 MG/ML
1 SOLUTION/ DROPS OPHTHALMIC PRN
Status: DISCONTINUED | OUTPATIENT
Start: 2024-04-04 | End: 2024-04-04 | Stop reason: HOSPADM

## 2024-04-04 RX ORDER — TROPICAMIDE 10 MG/ML
1 SOLUTION/ DROPS OPHTHALMIC PRN
Status: COMPLETED | OUTPATIENT
Start: 2024-04-04 | End: 2024-04-04

## 2024-04-04 RX ORDER — SODIUM CHLORIDE 0.9 % (FLUSH) 0.9 %
5-40 SYRINGE (ML) INJECTION PRN
Status: DISCONTINUED | OUTPATIENT
Start: 2024-04-04 | End: 2024-04-04 | Stop reason: HOSPADM

## 2024-04-04 RX ORDER — GENTAMICIN SULFATE 40 MG/ML
INJECTION, SOLUTION INTRAMUSCULAR; INTRAVENOUS PRN
Status: DISCONTINUED | OUTPATIENT
Start: 2024-04-04 | End: 2024-04-04 | Stop reason: ALTCHOICE

## 2024-04-04 RX ORDER — PHENYLEPHRINE HYDROCHLORIDE 100 MG/ML
1 SOLUTION/ DROPS OPHTHALMIC PRN
Status: DISCONTINUED | OUTPATIENT
Start: 2024-04-04 | End: 2024-04-04 | Stop reason: HOSPADM

## 2024-04-04 RX ORDER — ONDANSETRON 2 MG/ML
INJECTION INTRAMUSCULAR; INTRAVENOUS PRN
Status: DISCONTINUED | OUTPATIENT
Start: 2024-04-04 | End: 2024-04-04 | Stop reason: SDUPTHER

## 2024-04-04 RX ORDER — SODIUM CHLORIDE 0.9 % (FLUSH) 0.9 %
5-40 SYRINGE (ML) INJECTION EVERY 12 HOURS SCHEDULED
Status: CANCELLED | OUTPATIENT
Start: 2024-04-04

## 2024-04-04 RX ORDER — SODIUM CHLORIDE 9 MG/ML
INJECTION, SOLUTION INTRAVENOUS PRN
Status: CANCELLED | OUTPATIENT
Start: 2024-04-04

## 2024-04-04 RX ORDER — SODIUM CHLORIDE 9 MG/ML
INJECTION, SOLUTION INTRAVENOUS CONTINUOUS PRN
Status: DISCONTINUED | OUTPATIENT
Start: 2024-04-04 | End: 2024-04-04 | Stop reason: SDUPTHER

## 2024-04-04 RX ADMIN — PHENYLEPHRINE HYDROCHLORIDE 1 DROP: 25 SOLUTION/ DROPS OPHTHALMIC at 10:58

## 2024-04-04 RX ADMIN — Medication 1 DROP: at 10:58

## 2024-04-04 RX ADMIN — Medication 1 DROP: at 10:57

## 2024-04-04 RX ADMIN — FENTANYL CITRATE 25 MCG: 50 INJECTION, SOLUTION INTRAMUSCULAR; INTRAVENOUS at 13:34

## 2024-04-04 RX ADMIN — SODIUM CHLORIDE: 9 INJECTION, SOLUTION INTRAVENOUS at 13:30

## 2024-04-04 RX ADMIN — FENTANYL CITRATE 25 MCG: 50 INJECTION, SOLUTION INTRAMUSCULAR; INTRAVENOUS at 13:38

## 2024-04-04 RX ADMIN — MIDAZOLAM 1 MG: 1 INJECTION INTRAMUSCULAR; INTRAVENOUS at 13:37

## 2024-04-04 RX ADMIN — Medication 1 DROP: at 11:08

## 2024-04-04 RX ADMIN — PHENYLEPHRINE HYDROCHLORIDE 1 DROP: 25 SOLUTION/ DROPS OPHTHALMIC at 11:08

## 2024-04-04 RX ADMIN — MIDAZOLAM 1 MG: 1 INJECTION INTRAMUSCULAR; INTRAVENOUS at 13:34

## 2024-04-04 RX ADMIN — ONDANSETRON 4 MG: 2 INJECTION INTRAMUSCULAR; INTRAVENOUS at 13:44

## 2024-04-04 RX ADMIN — PHENYLEPHRINE HYDROCHLORIDE 1 DROP: 25 SOLUTION/ DROPS OPHTHALMIC at 11:18

## 2024-04-04 RX ADMIN — Medication 1 DROP: at 11:18

## 2024-04-04 ASSESSMENT — PAIN - FUNCTIONAL ASSESSMENT
PAIN_FUNCTIONAL_ASSESSMENT: 0-10

## 2024-04-04 NOTE — ANESTHESIA PRE PROCEDURE
Department of Anesthesiology  Preprocedure Note       Name:  Sallie Gao   Age:  68 y.o.  :  1955                                          MRN:  [id number]         Date:  2024      Surgeon: Surgeon(s):  Feliberto Moreno MD    Procedure: Procedure(s):  PARS PLANA VITRECTOMY 25 GAUGE MEMBRANE PEEL POSSIBLE OZURDEX RIGHT EYE    Medications prior to admission:   Prior to Admission medications    Medication Sig Start Date End Date Taking? Authorizing Provider   Azelastine HCl 137 MCG/SPRAY SOLN INSTILL 1-2 SPRAYS INTO EACH NOSTRIL TWICE DAILY AS NEEDED FOR RHINITIS 24   Justo Salguero APRN - CNP   sodium chloride (ALTAMIST SPRAY) 0.65 % nasal spray 2 sprays by Nasal route 4 times daily 23   Justo Salguero APRN - CNP   aspirin 81 MG chewable tablet Take 1 tablet by mouth daily 2/15/20   Raulito Fitch MD   zolpidem (AMBIEN CR) 12.5 MG extended release tablet Take 1 tablet by mouth nightly as needed for Sleep.    Provider, MD Brayden   ezetimibe (ZETIA) 10 MG tablet Take 1 tablet by mouth daily generic 19   Brando Hinojosa MD   empagliflozin (JARDIANCE) 25 MG tablet Take 25 mg by mouth daily 19   Brando Hinojosa MD   gabapentin (NEURONTIN) 600 MG tablet Take 1 tablet by mouth nightly for 30 days. 19  Zohreh Cazares DO       Current medications:    Current Facility-Administered Medications   Medication Dose Route Frequency Provider Last Rate Last Admin    sodium chloride flush 0.9 % injection 5-40 mL  5-40 mL IntraVENous 2 times per day Feliberto Moreno MD        sodium chloride flush 0.9 % injection 5-40 mL  5-40 mL IntraVENous PRN Feliberto Moreno MD        0.9 % sodium chloride infusion   IntraVENous PRN Feliberto Moreno MD        proparacaine (ALCAINE) 0.5 % ophthalmic solution 1 drop  1 drop Right Eye PRN Feliberto Moreno MD   1 drop at 24 1057    cyclopentolate (CYCLOGYL) 1 % ophthalmic solution 1 drop  1 drop Right Eye PRN Josh

## 2024-04-04 NOTE — ANESTHESIA POSTPROCEDURE EVALUATION
Department of Anesthesiology  Postprocedure Note    Patient: Sallie Gao  MRN: [id number]  YOB: 1955  Date of evaluation: 4/4/2024    Procedure Summary       Date: 04/04/24 Room / Location: [address]    Anesthesia Start: 1335 Anesthesia Stop: 1423    Procedure: PARS PLANA VITRECTOMY 25 GAUGE MEMBRANE PEEL RIGHT EYE (Right: Eye) Diagnosis:       Right epiretinal membrane      (Right epiretinal membrane [H35.371])    Surgeons: Feliberto Moreno MD Responsible Provider: Edyta Keene DO    Anesthesia Type: MAC ASA Status: 3            Anesthesia Type: No value filed.    Jovan Phase I: Jovan Score: 10    Jovan Phase II:      Anesthesia Post Evaluation    No notable events documented.

## 2024-04-04 NOTE — OP NOTE
[place] [address]                            OPERATIVE REPORT      PATIENT NAME: JANNIE BURGER              : 1955  MED REC NO: [id number]                        ROOM: [facility]  ACCOUNT NO: [id number]                       ADMIT DATE: 2024  PROVIDER: Feliberto Moreno MD      DATE OF PROCEDURE:  2024    SURGEON:  Feliberto Moreno MD    ASSISTANT:  Joleen Blanc.    PREOPERATIVE DIAGNOSIS:  Epiretinal membrane of the right eye.    POSTOPERATIVE DIAGNOSIS:  Epiretinal membrane of the right eye.    OPERATION:  A 25-gauge vitrectomy with combined ERM-ILM peeling, partial air-fluid exchange.    COMPLICATIONS:  None    CLINICAL NOTE:  The patient is a 68-year-old female who had decreased vision in her right eye, was found to have an epiretinal membrane ***.  Risks and benefits of vitrectomy surgery with ERM peeling were discussed with the patient in layman's terms.  After understanding all the risks and benefits, she agreed to undergo the procedure.    DESCRIPTION OF PROCEDURE:  After proper informed consent was obtained, the patient was brought to the operating room, placed in supine position where some sedation was given.  Retrobulbar block was administered to the right retrobulbar space, approximately 5 mL of 50:50 mixture of 0.5% Marcaine with 2% lidocaine without epinephrine was used.  After adequate anesthesia and akinesia, the patient was then prepped and draped in the usual fashion for ophthalmic surgery of her right eye.  A George wire lid speculum was inserted into the conjunctival fornices.  Trocars were introduced inferotemporally, superotemporally, and superonasally each 3.5 mm posterior to the limbus with gentle conjunctival displacement.  Infusion cannula was opened under direct visualization.  A core vitrectomy was then carried out.  There already ***.  Then, contact lens was used.  Diluted

## 2024-07-25 RX ORDER — AZELASTINE HYDROCHLORIDE 137 UG/1
SPRAY, METERED NASAL
Refills: 1 | OUTPATIENT
Start: 2024-07-25

## 2024-09-11 ENCOUNTER — OFFICE VISIT (OUTPATIENT)
Dept: PODIATRY | Age: 69
End: 2024-09-11
Payer: COMMERCIAL

## 2024-09-11 VITALS — WEIGHT: 165 LBS | BODY MASS INDEX: 27.49 KG/M2 | HEIGHT: 65 IN

## 2024-09-11 DIAGNOSIS — M19.071 ARTHRITIS OF MIDTARSAL JOINT OF RIGHT FOOT: ICD-10-CM

## 2024-09-11 DIAGNOSIS — M65.9 TENOSYNOVITIS OF RIGHT FOOT: ICD-10-CM

## 2024-09-11 DIAGNOSIS — E11.9 TYPE 2 DIABETES MELLITUS WITHOUT COMPLICATION, WITHOUT LONG-TERM CURRENT USE OF INSULIN (HCC): Primary | ICD-10-CM

## 2024-09-11 PROCEDURE — 1123F ACP DISCUSS/DSCN MKR DOCD: CPT | Performed by: PODIATRIST

## 2024-09-11 PROCEDURE — 99204 OFFICE O/P NEW MOD 45 MIN: CPT | Performed by: PODIATRIST

## 2024-09-27 ENCOUNTER — TELEPHONE (OUTPATIENT)
Dept: PODIATRY | Age: 69
End: 2024-09-27

## 2024-09-27 DIAGNOSIS — M65.9 SYNOVITIS AND TENOSYNOVITIS, UNSPECIFIED: ICD-10-CM

## 2024-09-27 PROBLEM — M65.90 SYNOVITIS AND TENOSYNOVITIS, UNSPECIFIED: Status: ACTIVE | Noted: 2024-09-27

## 2024-10-17 NOTE — PROGRESS NOTES
Jennifer, at Dr. Hudson's office, notified pt will need to be rescheduled due to taking mounjaro yesterday

## 2024-10-17 NOTE — PROGRESS NOTES
Spoke to pt, she took her mounjaro yesterday 10/16/2024. Informed pt that mounjaro needs to be held 7days prior to surgery. Pt is also on jardiance. Informed pt that jardiance will need to be held 3 days prior to surgery. Left message at Dr. Hudson's office, regarding this.

## 2024-10-18 ENCOUNTER — ANESTHESIA EVENT (OUTPATIENT)
Dept: OPERATING ROOM | Age: 69
End: 2024-10-18
Payer: COMMERCIAL

## 2024-10-21 ENCOUNTER — ANESTHESIA (OUTPATIENT)
Dept: OPERATING ROOM | Age: 69
End: 2024-10-21
Payer: COMMERCIAL

## 2024-10-21 ENCOUNTER — APPOINTMENT (OUTPATIENT)
Dept: GENERAL RADIOLOGY | Age: 69
End: 2024-10-21
Payer: COMMERCIAL

## 2024-11-09 ENCOUNTER — HOSPITAL ENCOUNTER (EMERGENCY)
Age: 69
Discharge: ANOTHER HOSPITAL | End: 2024-11-09
Attending: EMERGENCY MEDICINE
Payer: COMMERCIAL

## 2024-11-09 ENCOUNTER — APPOINTMENT (OUTPATIENT)
Dept: CT IMAGING | Age: 69
End: 2024-11-09
Payer: COMMERCIAL

## 2024-11-09 ENCOUNTER — HOSPITAL ENCOUNTER (OUTPATIENT)
Age: 69
Setting detail: OBSERVATION
Discharge: HOME | End: 2024-11-10
Attending: STUDENT IN AN ORGANIZED HEALTH CARE EDUCATION/TRAINING PROGRAM | Admitting: STUDENT IN AN ORGANIZED HEALTH CARE EDUCATION/TRAINING PROGRAM
Payer: COMMERCIAL

## 2024-11-09 VITALS
HEART RATE: 71 BPM | RESPIRATION RATE: 16 BRPM | BODY MASS INDEX: 26.16 KG/M2 | DIASTOLIC BLOOD PRESSURE: 61 MMHG | TEMPERATURE: 98 F | SYSTOLIC BLOOD PRESSURE: 125 MMHG | WEIGHT: 157 LBS | OXYGEN SATURATION: 97 % | HEIGHT: 65 IN

## 2024-11-09 DIAGNOSIS — K57.92 DIVERTICULITIS: Primary | ICD-10-CM

## 2024-11-09 DIAGNOSIS — K57.20 COLONIC DIVERTICULAR ABSCESS: Primary | ICD-10-CM

## 2024-11-09 PROBLEM — K57.40 DIVERTICULITIS OF BOTH LARGE AND SMALL INTESTINE WITH ABSCESS WITHOUT BLEEDING: Status: ACTIVE | Noted: 2024-11-09

## 2024-11-09 LAB
ALBUMIN SERPL-MCNC: 3.9 G/DL (ref 3.5–5.2)
ALP SERPL-CCNC: 67 U/L (ref 35–104)
ALT SERPL-CCNC: <5 U/L (ref 0–32)
ANION GAP SERPL CALCULATED.3IONS-SCNC: 12 MMOL/L (ref 7–16)
AST SERPL-CCNC: 6 U/L (ref 0–31)
BACTERIA URNS QL MICRO: ABNORMAL
BASOPHILS # BLD: 0.04 K/UL (ref 0–0.2)
BASOPHILS NFR BLD: 1 % (ref 0–2)
BILIRUB SERPL-MCNC: 1 MG/DL (ref 0–1.2)
BILIRUB UR QL STRIP: ABNORMAL
BUN SERPL-MCNC: 24 MG/DL (ref 6–23)
CALCIUM SERPL-MCNC: 9.8 MG/DL (ref 8.6–10.2)
CHLORIDE SERPL-SCNC: 103 MMOL/L (ref 98–107)
CLARITY UR: CLEAR
CO2 SERPL-SCNC: 24 MMOL/L (ref 22–29)
COLOR UR: YELLOW
CREAT SERPL-MCNC: 0.9 MG/DL (ref 0.5–1)
EKG ATRIAL RATE: 71 BPM
EKG P AXIS: 9 DEGREES
EKG P-R INTERVAL: 166 MS
EKG Q-T INTERVAL: 390 MS
EKG QRS DURATION: 74 MS
EKG QTC CALCULATION (BAZETT): 423 MS
EKG R AXIS: -18 DEGREES
EKG T AXIS: 0 DEGREES
EKG VENTRICULAR RATE: 71 BPM
EOSINOPHIL # BLD: 0.14 K/UL (ref 0.05–0.5)
EOSINOPHILS RELATIVE PERCENT: 2 % (ref 0–6)
EPI CELLS #/AREA URNS HPF: ABNORMAL /HPF (ref 0–5)
ERYTHROCYTE [DISTWIDTH] IN BLOOD BY AUTOMATED COUNT: 13.4 % (ref 11.5–15)
GFR, ESTIMATED: 75 ML/MIN/1.73M2
GLUCOSE SERPL-MCNC: 117 MG/DL (ref 74–99)
GLUCOSE UR STRIP-MCNC: ABNORMAL MG/DL
HCT VFR BLD AUTO: 41.8 % (ref 34–48)
HGB BLD-MCNC: 13.6 G/DL (ref 11.5–15.5)
HGB UR QL STRIP.AUTO: NEGATIVE
IMM GRANULOCYTES # BLD AUTO: <0.03 K/UL (ref 0–0.58)
IMM GRANULOCYTES NFR BLD: 0 % (ref 0–5)
KETONES UR STRIP-MCNC: ABNORMAL MG/DL
LACTATE BLDV-SCNC: 1 MMOL/L (ref 0.5–2.2)
LEUKOCYTE ESTERASE UR QL STRIP: NEGATIVE
LIPASE SERPL-CCNC: 31 U/L (ref 13–60)
LYMPHOCYTES NFR BLD: 1.11 K/UL (ref 1.5–4)
LYMPHOCYTES RELATIVE PERCENT: 14 % (ref 20–42)
MCH RBC QN AUTO: 27 PG (ref 26–35)
MCHC RBC AUTO-ENTMCNC: 32.5 G/DL (ref 32–34.5)
MCV RBC AUTO: 82.9 FL (ref 80–99.9)
MONOCYTES NFR BLD: 0.68 K/UL (ref 0.1–0.95)
MONOCYTES NFR BLD: 8 % (ref 2–12)
NEUTROPHILS NFR BLD: 76 % (ref 43–80)
NEUTS SEG NFR BLD: 6.16 K/UL (ref 1.8–7.3)
NITRITE UR QL STRIP: NEGATIVE
PH UR STRIP: 5.5 [PH] (ref 5–8)
PLATELET # BLD AUTO: 264 K/UL (ref 130–450)
PMV BLD AUTO: 10.9 FL (ref 7–12)
POTASSIUM SERPL-SCNC: 4 MMOL/L (ref 3.5–5)
PROT SERPL-MCNC: 7.2 G/DL (ref 6.4–8.3)
PROT UR STRIP-MCNC: NEGATIVE MG/DL
RBC # BLD AUTO: 5.04 M/UL (ref 3.5–5.5)
RBC #/AREA URNS HPF: ABNORMAL /HPF (ref 0–2)
SODIUM SERPL-SCNC: 139 MMOL/L (ref 132–146)
SP GR UR STRIP: 1.02 (ref 1–1.03)
TROPONIN I SERPL HS-MCNC: 11 NG/L (ref 0–9)
UROBILINOGEN UR STRIP-ACNC: NORMAL EU/DL (ref 0–1)
WBC #/AREA URNS HPF: ABNORMAL /HPF (ref 0–5)
WBC OTHER # BLD: 8.2 K/UL (ref 4.5–11.5)

## 2024-11-09 PROCEDURE — 6360000002 HC RX W HCPCS: Performed by: STUDENT IN AN ORGANIZED HEALTH CARE EDUCATION/TRAINING PROGRAM

## 2024-11-09 PROCEDURE — 96365 THER/PROPH/DIAG IV INF INIT: CPT

## 2024-11-09 PROCEDURE — 81001 URINALYSIS AUTO W/SCOPE: CPT

## 2024-11-09 PROCEDURE — 74177 CT ABD & PELVIS W/CONTRAST: CPT

## 2024-11-09 PROCEDURE — 84484 ASSAY OF TROPONIN QUANT: CPT

## 2024-11-09 PROCEDURE — 6370000000 HC RX 637 (ALT 250 FOR IP): Performed by: STUDENT IN AN ORGANIZED HEALTH CARE EDUCATION/TRAINING PROGRAM

## 2024-11-09 PROCEDURE — G0379 DIRECT REFER HOSPITAL OBSERV: HCPCS

## 2024-11-09 PROCEDURE — 80053 COMPREHEN METABOLIC PANEL: CPT

## 2024-11-09 PROCEDURE — G0378 HOSPITAL OBSERVATION PER HR: HCPCS

## 2024-11-09 PROCEDURE — 6360000004 HC RX CONTRAST MEDICATION: Performed by: RADIOLOGY

## 2024-11-09 PROCEDURE — 87040 BLOOD CULTURE FOR BACTERIA: CPT

## 2024-11-09 PROCEDURE — 2580000003 HC RX 258: Performed by: EMERGENCY MEDICINE

## 2024-11-09 PROCEDURE — 83605 ASSAY OF LACTIC ACID: CPT

## 2024-11-09 PROCEDURE — 85025 COMPLETE CBC W/AUTO DIFF WBC: CPT

## 2024-11-09 PROCEDURE — 99285 EMERGENCY DEPT VISIT HI MDM: CPT

## 2024-11-09 PROCEDURE — 96366 THER/PROPH/DIAG IV INF ADDON: CPT

## 2024-11-09 PROCEDURE — 6360000002 HC RX W HCPCS: Performed by: EMERGENCY MEDICINE

## 2024-11-09 PROCEDURE — 96375 TX/PRO/DX INJ NEW DRUG ADDON: CPT

## 2024-11-09 PROCEDURE — 93005 ELECTROCARDIOGRAM TRACING: CPT | Performed by: EMERGENCY MEDICINE

## 2024-11-09 PROCEDURE — 2580000003 HC RX 258: Performed by: STUDENT IN AN ORGANIZED HEALTH CARE EDUCATION/TRAINING PROGRAM

## 2024-11-09 PROCEDURE — 96372 THER/PROPH/DIAG INJ SC/IM: CPT

## 2024-11-09 PROCEDURE — 83690 ASSAY OF LIPASE: CPT

## 2024-11-09 RX ORDER — POTASSIUM CHLORIDE 1500 MG/1
40 TABLET, EXTENDED RELEASE ORAL PRN
Status: DISCONTINUED | OUTPATIENT
Start: 2024-11-09 | End: 2024-11-10 | Stop reason: HOSPADM

## 2024-11-09 RX ORDER — ONDANSETRON 2 MG/ML
4 INJECTION INTRAMUSCULAR; INTRAVENOUS EVERY 6 HOURS PRN
Status: DISCONTINUED | OUTPATIENT
Start: 2024-11-09 | End: 2024-11-10 | Stop reason: HOSPADM

## 2024-11-09 RX ORDER — SODIUM CHLORIDE 9 MG/ML
INJECTION, SOLUTION INTRAVENOUS PRN
Status: DISCONTINUED | OUTPATIENT
Start: 2024-11-09 | End: 2024-11-10 | Stop reason: HOSPADM

## 2024-11-09 RX ORDER — METRONIDAZOLE 500 MG/100ML
500 INJECTION, SOLUTION INTRAVENOUS EVERY 8 HOURS
Status: DISCONTINUED | OUTPATIENT
Start: 2024-11-09 | End: 2024-11-10

## 2024-11-09 RX ORDER — SODIUM CHLORIDE 0.9 % (FLUSH) 0.9 %
5-40 SYRINGE (ML) INJECTION EVERY 12 HOURS SCHEDULED
Status: DISCONTINUED | OUTPATIENT
Start: 2024-11-09 | End: 2024-11-10 | Stop reason: HOSPADM

## 2024-11-09 RX ORDER — ZOLPIDEM TARTRATE 5 MG/1
5 TABLET ORAL NIGHTLY PRN
Status: DISCONTINUED | OUTPATIENT
Start: 2024-11-09 | End: 2024-11-10 | Stop reason: HOSPADM

## 2024-11-09 RX ORDER — ENOXAPARIN SODIUM 100 MG/ML
40 INJECTION SUBCUTANEOUS DAILY
Status: DISCONTINUED | OUTPATIENT
Start: 2024-11-09 | End: 2024-11-10 | Stop reason: HOSPADM

## 2024-11-09 RX ORDER — EZETIMIBE 10 MG/1
10 TABLET ORAL DAILY
Status: DISCONTINUED | OUTPATIENT
Start: 2024-11-09 | End: 2024-11-10 | Stop reason: HOSPADM

## 2024-11-09 RX ORDER — ACETAMINOPHEN 325 MG/1
650 TABLET ORAL EVERY 4 HOURS PRN
Status: DISCONTINUED | OUTPATIENT
Start: 2024-11-09 | End: 2024-11-10 | Stop reason: HOSPADM

## 2024-11-09 RX ORDER — IOPAMIDOL 755 MG/ML
75 INJECTION, SOLUTION INTRAVASCULAR
Status: COMPLETED | OUTPATIENT
Start: 2024-11-09 | End: 2024-11-09

## 2024-11-09 RX ORDER — GABAPENTIN 300 MG/1
600 CAPSULE ORAL NIGHTLY
Status: DISCONTINUED | OUTPATIENT
Start: 2024-11-09 | End: 2024-11-10 | Stop reason: HOSPADM

## 2024-11-09 RX ORDER — ONDANSETRON 4 MG/1
4 TABLET, ORALLY DISINTEGRATING ORAL EVERY 8 HOURS PRN
Status: DISCONTINUED | OUTPATIENT
Start: 2024-11-09 | End: 2024-11-10 | Stop reason: HOSPADM

## 2024-11-09 RX ORDER — MAGNESIUM SULFATE IN WATER 40 MG/ML
2000 INJECTION, SOLUTION INTRAVENOUS PRN
Status: DISCONTINUED | OUTPATIENT
Start: 2024-11-09 | End: 2024-11-10 | Stop reason: HOSPADM

## 2024-11-09 RX ORDER — SODIUM CHLORIDE 0.9 % (FLUSH) 0.9 %
5-40 SYRINGE (ML) INJECTION PRN
Status: DISCONTINUED | OUTPATIENT
Start: 2024-11-09 | End: 2024-11-10 | Stop reason: HOSPADM

## 2024-11-09 RX ORDER — POTASSIUM CHLORIDE 7.45 MG/ML
10 INJECTION INTRAVENOUS PRN
Status: DISCONTINUED | OUTPATIENT
Start: 2024-11-09 | End: 2024-11-09 | Stop reason: CLARIF

## 2024-11-09 RX ADMIN — PIPERACILLIN AND TAZOBACTAM 4500 MG: 4; .5 INJECTION, POWDER, FOR SOLUTION INTRAVENOUS at 09:32

## 2024-11-09 RX ADMIN — METRONIDAZOLE 500 MG: 500 INJECTION, SOLUTION INTRAVENOUS at 20:19

## 2024-11-09 RX ADMIN — ACETAMINOPHEN 650 MG: 325 TABLET ORAL at 17:20

## 2024-11-09 RX ADMIN — ZOLPIDEM TARTRATE 5 MG: 5 TABLET ORAL at 20:22

## 2024-11-09 RX ADMIN — SODIUM CHLORIDE, PRESERVATIVE FREE 10 ML: 5 INJECTION INTRAVENOUS at 20:17

## 2024-11-09 RX ADMIN — METRONIDAZOLE 500 MG: 500 INJECTION, SOLUTION INTRAVENOUS at 13:16

## 2024-11-09 RX ADMIN — CEFTRIAXONE 1000 MG: 1 INJECTION, POWDER, FOR SOLUTION INTRAMUSCULAR; INTRAVENOUS at 13:09

## 2024-11-09 RX ADMIN — GABAPENTIN 600 MG: 300 CAPSULE ORAL at 20:17

## 2024-11-09 RX ADMIN — IOPAMIDOL 75 ML: 755 INJECTION, SOLUTION INTRAVENOUS at 08:13

## 2024-11-09 RX ADMIN — ENOXAPARIN SODIUM 40 MG: 100 INJECTION SUBCUTANEOUS at 13:17

## 2024-11-09 RX ADMIN — EZETIMIBE 10 MG: 10 TABLET ORAL at 14:35

## 2024-11-09 ASSESSMENT — PAIN DESCRIPTION - LOCATION
LOCATION: HEAD
LOCATION: ABDOMEN

## 2024-11-09 ASSESSMENT — PAIN DESCRIPTION - DESCRIPTORS
DESCRIPTORS: ACHING;THROBBING
DESCRIPTORS: ACHING;CRAMPING

## 2024-11-09 ASSESSMENT — PAIN - FUNCTIONAL ASSESSMENT
PAIN_FUNCTIONAL_ASSESSMENT: 0-10
PAIN_FUNCTIONAL_ASSESSMENT: ACTIVITIES ARE NOT PREVENTED

## 2024-11-09 ASSESSMENT — PAIN DESCRIPTION - FREQUENCY: FREQUENCY: CONTINUOUS

## 2024-11-09 ASSESSMENT — PAIN SCALES - GENERAL
PAINLEVEL_OUTOF10: 3
PAINLEVEL_OUTOF10: 2

## 2024-11-09 ASSESSMENT — PAIN DESCRIPTION - PAIN TYPE: TYPE: ACUTE PAIN

## 2024-11-09 ASSESSMENT — PAIN DESCRIPTION - ORIENTATION: ORIENTATION: RIGHT;LEFT;LOWER

## 2024-11-09 ASSESSMENT — PAIN DESCRIPTION - ONSET: ONSET: ON-GOING

## 2024-11-09 NOTE — ED NOTES
Blood culture obtained from following hospital guidelines. Cultures sent to lab.  
Nurse to nurse report called to the floor.  Patient's test results reviewed, and vitals signs reported to the receiving nurse.  And any and all other important information regarding the patient disclosed.     
200 feet

## 2024-11-09 NOTE — H&P
Department of General Surgery - Adult  Surgical Service   Attending History and Physical        CHIEF COMPLAINT: Abdominal pain    Reason for Admission: Diverticulitis with perforation and abscess    History Obtained From:  patient    HISTORY OF PRESENT ILLNESS:    Patient is a pleasant 69-year-old female who presents emergency department today with ongoing intermittent symptoms of crampy lower abdominal pain.  Patient last reflected it secondary to Mounjaro usage approximately 4 to 5 weeks ago when she was started.  Noted crampy abdominal pain at that time with associated diarrhea.  Patient was taken off this medication approximately 1 week ago.  Patient had mild persistence of symptoms with worsening 48 hours ago resulting in nausea and nonbloody bilious emesis.  Patient showed some mild improvement after this but felt that she needed to present to emergency department due to concerns for possible underlying infectious etiology.  Patient has been tolerating p.o. intake well since last episode yesterday.  Notes persistence of crampy abdominal pain suprapubic area.  Notes intermittent diarrhea week prior which she is now described as regular without blood.  Denies other acute complaints at this time.    At outside facility, CT imaging was performed which demonstrated diverticulitis with abscess formation 3 x 4 cm approximate in size.  For this reason patient was transferred to surgical center for evaluation and care.    Past Medical History:        Diagnosis Date    Embolism ([facility]) 2003    pulmonary post op    Eye disorder     Right eye    Hx of blood clots     SHANTANU (obstructive sleep apnea)     Unable to tolerate CPAP    Type 2 diabetes mellitus without complication ([facility]) 04/27/2016     Past Surgical History:        Procedure Laterality Date    ANESTHESIA NERVE BLOCK Left 03/07/2019    LEFT L4-5 TRANSFORAMINAL EPIDURAL STEROID INJECTION performed by Zohreh Cazares DO at [facility] OR    ANESTHESIA NERVE

## 2024-11-09 NOTE — ED PROVIDER NOTES
HPI:  11/9/24, Time: 6:56 AM MARISOL Gao is a 69 y.o. female presenting to the ED for abdominal pain.  Symptoms have been going on intermittently for the last 4 weeks since patient was initiated on Mounjaro for her diabetes.  She states she was getting intermittent abdominal pain with associated diarrhea after taking the medication so her doctor took her off of it about 1 week ago.  She states she is now having persistent symptoms.  Yesterday she developed nausea with nonbloody emesis and worsening lower abdominal pain, so she came to the ED for further evaluation.  She denies fevers, chest pain, shortness with, cough, dysuria.  She states she has a history of diverticulitis.  She states she has been able to tolerate some po, and symptoms seem to be unrelated to eating.    --------------------------------------------- PAST HISTORY ---------------------------------------------  Past Medical History:  has a past medical history of Embolism (HCC), Eye disorder, Hx of blood clots, SHANTANU (obstructive sleep apnea), and Type 2 diabetes mellitus without complication (HCC).    Past Surgical History:  has a past surgical history that includes back surgery (2003); Hysterectomy (2002); Inguinal hernia repair (1961, 1962); Tonsillectomy (1975); Nose surgery (2008); eye surgery; Cataract removal; Nerve Block (Left, 03/07/2019); Anesthesia Nerve Block (Left, 03/07/2019); Nerve Block (Left, 03/28/2019); Anesthesia Nerve Block (Left, 03/28/2019); Nerve Block (Left, 09/05/2019); epidural steroid injection (Left, 09/05/2019); and vitrectomy (Right, 4/4/2024).    Social History:  reports that she has never smoked. She has never used smokeless tobacco. She reports current alcohol use. She reports that she does not use drugs.    Family History: family history includes Cancer in her brother; Diabetes in her father; Heart Disease in her paternal grandfather.     The patient’s home medications have been reviewed.    Allergies:

## 2024-11-09 NOTE — PROGRESS NOTES
4 Eyes Skin Assessment     NAME:  Sallie Gao  YOB: 1955  MEDICAL RECORD NUMBER:  [id number]    The patient is being assessed for  Admission    I agree that at least one RN has performed a thorough Head to Toe Skin Assessment on the patient. ALL assessment sites listed below have been assessed.      Areas assessed by both nurses:    Head, Face, Ears, Shoulders, Back, Chest, Arms, Elbows, Hands, Sacrum. Buttock, Coccyx, Ischium, Legs. Feet and Heels, and Under Medical Devices         Does the Patient have a Wound? No noted wound(s)       Mitesh Prevention initiated by RN: No  Wound Care Orders initiated by RN: No    Pressure Injury (Stage 3,4, Unstageable, DTI, NWPT, and Complex wounds) if present, place Wound referral order by RN under : No    New Ostomies, if present place, Ostomy referral order under : No     Nurse 1 eSignature: Electronically signed by Aileen Lockett RN on 11/9/24 at 11:57 AM EST    **SHARE this note so that the co-signing nurse can place an eSignature**    Nurse 2 eSignature: Electronically signed by Lona Joiner RN on 11/9/24 at 11:58 AM EST

## 2024-11-10 VITALS
TEMPERATURE: 97.9 F | DIASTOLIC BLOOD PRESSURE: 63 MMHG | OXYGEN SATURATION: 100 % | BODY MASS INDEX: 26.16 KG/M2 | HEART RATE: 66 BPM | HEIGHT: 65 IN | RESPIRATION RATE: 18 BRPM | WEIGHT: 157 LBS | SYSTOLIC BLOOD PRESSURE: 120 MMHG

## 2024-11-10 PROBLEM — K57.40 DIVERTICULITIS OF BOTH LARGE AND SMALL INTESTINE WITH ABSCESS WITHOUT BLEEDING: Status: RESOLVED | Noted: 2024-11-09 | Resolved: 2024-11-10

## 2024-11-10 PROBLEM — K57.92 DIVERTICULITIS: Status: ACTIVE | Noted: 2024-11-10

## 2024-11-10 LAB
ALBUMIN SERPL-MCNC: 3.7 G/DL (ref 3.5–5.2)
ALP SERPL-CCNC: 67 U/L (ref 35–104)
ALT SERPL-CCNC: 8 U/L (ref 0–32)
ANION GAP SERPL CALCULATED.3IONS-SCNC: 13 MMOL/L (ref 7–16)
AST SERPL-CCNC: 14 U/L (ref 0–31)
BASOPHILS # BLD: 0.04 K/UL (ref 0–0.2)
BASOPHILS NFR BLD: 1 % (ref 0–2)
BILIRUB SERPL-MCNC: 0.6 MG/DL (ref 0–1.2)
BUN SERPL-MCNC: 18 MG/DL (ref 6–23)
CALCIUM SERPL-MCNC: 9.3 MG/DL (ref 8.6–10.2)
CHLORIDE SERPL-SCNC: 103 MMOL/L (ref 98–107)
CO2 SERPL-SCNC: 24 MMOL/L (ref 22–29)
CREAT SERPL-MCNC: 0.8 MG/DL (ref 0.5–1)
EOSINOPHIL # BLD: 0.29 K/UL (ref 0.05–0.5)
EOSINOPHILS RELATIVE PERCENT: 6 % (ref 0–6)
ERYTHROCYTE [DISTWIDTH] IN BLOOD BY AUTOMATED COUNT: 13.5 % (ref 11.5–15)
GFR, ESTIMATED: 81 ML/MIN/1.73M2
GLUCOSE SERPL-MCNC: 99 MG/DL (ref 74–99)
HCT VFR BLD AUTO: 40.9 % (ref 34–48)
HGB BLD-MCNC: 13.2 G/DL (ref 11.5–15.5)
IMM GRANULOCYTES # BLD AUTO: <0.03 K/UL (ref 0–0.58)
IMM GRANULOCYTES NFR BLD: 0 % (ref 0–5)
LYMPHOCYTES NFR BLD: 1.35 K/UL (ref 1.5–4)
LYMPHOCYTES RELATIVE PERCENT: 28 % (ref 20–42)
MCH RBC QN AUTO: 27.4 PG (ref 26–35)
MCHC RBC AUTO-ENTMCNC: 32.3 G/DL (ref 32–34.5)
MCV RBC AUTO: 85 FL (ref 80–99.9)
MICROORGANISM SPEC CULT: NORMAL
MICROORGANISM SPEC CULT: NORMAL
MONOCYTES NFR BLD: 0.58 K/UL (ref 0.1–0.95)
MONOCYTES NFR BLD: 12 % (ref 2–12)
NEUTROPHILS NFR BLD: 52 % (ref 43–80)
NEUTS SEG NFR BLD: 2.5 K/UL (ref 1.8–7.3)
PLATELET # BLD AUTO: 290 K/UL (ref 130–450)
PMV BLD AUTO: 10.6 FL (ref 7–12)
POTASSIUM SERPL-SCNC: 3.8 MMOL/L (ref 3.5–5)
PROT SERPL-MCNC: 6.7 G/DL (ref 6.4–8.3)
RBC # BLD AUTO: 4.81 M/UL (ref 3.5–5.5)
SERVICE CMNT-IMP: NORMAL
SERVICE CMNT-IMP: NORMAL
SODIUM SERPL-SCNC: 140 MMOL/L (ref 132–146)
SPECIMEN DESCRIPTION: NORMAL
SPECIMEN DESCRIPTION: NORMAL
WBC OTHER # BLD: 4.8 K/UL (ref 4.5–11.5)

## 2024-11-10 PROCEDURE — 80053 COMPREHEN METABOLIC PANEL: CPT

## 2024-11-10 PROCEDURE — 6360000002 HC RX W HCPCS: Performed by: STUDENT IN AN ORGANIZED HEALTH CARE EDUCATION/TRAINING PROGRAM

## 2024-11-10 PROCEDURE — 85025 COMPLETE CBC W/AUTO DIFF WBC: CPT

## 2024-11-10 PROCEDURE — G0378 HOSPITAL OBSERVATION PER HR: HCPCS

## 2024-11-10 PROCEDURE — 96366 THER/PROPH/DIAG IV INF ADDON: CPT

## 2024-11-10 PROCEDURE — 6370000000 HC RX 637 (ALT 250 FOR IP): Performed by: STUDENT IN AN ORGANIZED HEALTH CARE EDUCATION/TRAINING PROGRAM

## 2024-11-10 PROCEDURE — 2580000003 HC RX 258: Performed by: STUDENT IN AN ORGANIZED HEALTH CARE EDUCATION/TRAINING PROGRAM

## 2024-11-10 PROCEDURE — 96376 TX/PRO/DX INJ SAME DRUG ADON: CPT

## 2024-11-10 PROCEDURE — 96372 THER/PROPH/DIAG INJ SC/IM: CPT

## 2024-11-10 RX ORDER — METRONIDAZOLE 500 MG/1
500 TABLET ORAL EVERY 8 HOURS SCHEDULED
Qty: 21 TABLET | Refills: 0 | Status: SHIPPED | OUTPATIENT
Start: 2024-11-10 | End: 2024-11-10

## 2024-11-10 RX ORDER — CIPROFLOXACIN 500 MG/1
500 TABLET, FILM COATED ORAL EVERY 12 HOURS SCHEDULED
Qty: 14 TABLET | Refills: 0 | Status: SHIPPED | OUTPATIENT
Start: 2024-11-10 | End: 2024-11-10

## 2024-11-10 RX ORDER — CIPROFLOXACIN 500 MG/1
500 TABLET, FILM COATED ORAL EVERY 12 HOURS SCHEDULED
Status: DISCONTINUED | OUTPATIENT
Start: 2024-11-10 | End: 2024-11-10 | Stop reason: HOSPADM

## 2024-11-10 RX ORDER — CIPROFLOXACIN 500 MG/1
500 TABLET, FILM COATED ORAL EVERY 12 HOURS SCHEDULED
Qty: 14 TABLET | Refills: 0
Start: 2024-11-10 | End: 2024-11-10

## 2024-11-10 RX ORDER — CIPROFLOXACIN 500 MG/1
500 TABLET, FILM COATED ORAL EVERY 12 HOURS SCHEDULED
Qty: 14 TABLET | Refills: 0 | Status: SHIPPED | OUTPATIENT
Start: 2024-11-10 | End: 2024-11-17

## 2024-11-10 RX ORDER — METRONIDAZOLE 500 MG/1
500 TABLET ORAL EVERY 8 HOURS SCHEDULED
Status: DISCONTINUED | OUTPATIENT
Start: 2024-11-10 | End: 2024-11-10 | Stop reason: HOSPADM

## 2024-11-10 RX ORDER — METRONIDAZOLE 500 MG/1
500 TABLET ORAL EVERY 8 HOURS SCHEDULED
Qty: 21 TABLET | Refills: 0
Start: 2024-11-10 | End: 2024-11-10

## 2024-11-10 RX ORDER — METRONIDAZOLE 500 MG/1
500 TABLET ORAL EVERY 8 HOURS SCHEDULED
Qty: 21 TABLET | Refills: 0 | Status: SHIPPED | OUTPATIENT
Start: 2024-11-10 | End: 2024-11-17

## 2024-11-10 RX ADMIN — METRONIDAZOLE 500 MG: 500 INJECTION, SOLUTION INTRAVENOUS at 04:51

## 2024-11-10 RX ADMIN — ENOXAPARIN SODIUM 40 MG: 100 INJECTION SUBCUTANEOUS at 09:20

## 2024-11-10 RX ADMIN — EZETIMIBE 10 MG: 10 TABLET ORAL at 09:20

## 2024-11-10 RX ADMIN — CEFTRIAXONE 1000 MG: 1 INJECTION, POWDER, FOR SOLUTION INTRAMUSCULAR; INTRAVENOUS at 12:50

## 2024-11-10 RX ADMIN — SODIUM CHLORIDE, PRESERVATIVE FREE 10 ML: 5 INJECTION INTRAVENOUS at 09:20

## 2024-11-10 RX ADMIN — METRONIDAZOLE 500 MG: 500 INJECTION, SOLUTION INTRAVENOUS at 13:05

## 2024-11-10 NOTE — DISCHARGE INSTRUCTIONS
Diverticulitis: Care Instructions  Overview     Diverticulitis occurs when pouches form in the wall of the colon and become inflamed or infected. It can be very painful.  Doctors aren't sure what causes diverticulitis. There is no proof that foods such as nuts, seeds, or berries cause it or make it worse. A low-fiber diet can cause small, hard stools. This means it takes more pressure in the colon to move stools out of the body. This puts more pressure on the walls of the colon. The pressure from this may cause pouches to form in weak spots along the colon.  This condition may sometimes be treated by resting the bowel and taking medicines.  As you recover, high-fiber foods may help you avoid future problems.  Follow-up care is a key part of your treatment and safety. Be sure to make and go to all appointments, and call your doctor if you are having problems. It's also a good idea to know your test results and keep a list of the medicines you take.  How can you care for yourself at home?  Drink plenty of fluids. If you have kidney, heart, or liver disease and have to limit fluids, talk with your doctor before you increase the amount of fluids you drink.  Stay with liquids or start with small amounts of food until you are feeling better. Then you can return to regular foods and slowly increase the amount of fiber in your diet.  Get extra rest until you are feeling better.  Be safe with medicines. Read and follow all instructions on the label.  If the doctor gave you a prescription medicine for pain, take it as prescribed.  If you are not taking a prescription pain medicine, ask your doctor if you can take an over-the-counter medicine.  If your doctor prescribed antibiotics, take them as directed. Do not stop taking them just because you feel better. You need to take the full course of antibiotics.  Do not use laxatives or enemas unless your doctor tells you to use them.  When should you call for help?   Call your

## 2024-11-10 NOTE — DISCHARGE SUMMARY
Discharge Summary    Date: 11/10/2024  Patient Name: Sallie Gao    YOB: 1955     Age: 69 y.o.    Admit Date: 11/9/2024  Discharge Date: 11/10/2024  Discharge Condition: Good    Admission Diagnosis  Diverticulitis [K57.92];Diverticulitis of both large and small intestine with abscess without bleeding [K57.40];Diverticulitis of large intestine with abscess without bleeding [K57.20]      Discharge Diagnosis  Principal Problem (Resolved):    Diverticulitis of both large and small intestine with abscess without bleeding  Active Problems:    Diverticulitis of large intestine with abscess without bleeding    Diverticulitis      Hospital Stay  Narrative of Hospital Course:  Patient is a 69 year old female who presented with several weeks of discomfort after initiating new diabetic medication. Patient had medication stopped 1 week  ago but developed crampy LLQ abdominal pain with associated nausea prompting presentation to outside ED where CT imaging demonstrated findings concerning for acute diverticulitis with possible intraabdominal abscess. Patient was transferred to surgical [facility] hospital for evaluation and care. Patient demonstrated high normal leukocytosis with inflammatory changes of proximal sigmoid colon on CT. Patient also displayed tenderness over this area but without signs of peritonitis. Ct imaging reviewed with IR physician who noted abscess unamenable to percutaneous drainage. Patient was conservatively managed with CLD and IV abx. Patient recovered well. Diet was advanced. WCC normalized. Patient was transitioned to PO abx and discharged with instructions to f/u 1 week outpatient.    Consultants:  None    Surgeries/procedures Performed:  none     Treatments:            Discharge Plan/Disposition:  Home    Hospital/Incidental Findings Requiring Follow Up:    Patient Instructions:    Diet:    Activity:Activity as Tolerated  For number of days (if applicable):      Other Instructions:

## 2024-11-10 NOTE — DISCHARGE INSTR - COC
days: 220       Incision 19 Back Left (Active)   Number of days:        Incision 19 Back (Active)   Number of days: 1893        Elimination:  Continence:   Bowel: {YES / NO:}  Bladder: {YES / NO:}  Urinary Catheter: {Urinary Catheter:109204504}   Colostomy/Ileostomy/Ileal Conduit: {YES / NO:}       Date of Last BM: ***    Intake/Output Summary (Last 24 hours) at 11/10/2024 1423  Last data filed at 11/10/2024 1255  Gross per 24 hour   Intake --   Output 1100 ml   Net -1100 ml     I/O last 3 completed shifts:  In: -   Out: 700 [Urine:700]    Safety Concerns:     { SERVANDO Safety Concerns:373201151}    Impairments/Disabilities:      { SERVANDO Impairments/Disabilities:051019522}    Nutrition Therapy:  Current Nutrition Therapy:   { SERVANDO Diet List:507153163}    Routes of Feeding: {[facility] D[facility] Other Feedings:797916871}  Liquids: {Slp liquid thickness:28049}  Daily Fluid Restriction: {[facility] D[facility] Yes amt example:428084302}  Last Modified Barium Swallow with Video (Video Swallowing Test): {Done Not Done Date:}    Treatments at the Time of Hospital Discharge:   Respiratory Treatments: ***  Oxygen Therapy:  {Therapy; copd oxygen:67634}  Ventilator:    { CC Vent List:292692957}    Rehab Therapies: {THERAPEUTIC INTERVENTION:8104105441}  Weight Bearing Status/Restrictions: {[facility] Weight Bearin}  Other Medical Equipment (for information only, NOT a DME order):  {EQUIPMENT:436736049}  Other Treatments: ***    Patient's personal belongings (please select all that are sent with patient):  {[facility] DME Belongings:750163062}    RN SIGNATURE:  {Esignature:864782708}    CASE MANAGEMENT/SOCIAL WORK SECTION    Inpatient Status Date: ***    Readmission Risk Assessment Score:  Readmission Risk              Risk of Unplanned Readmission:  0           Discharging to Facility/ Agency   Name:   Address:  Phone:  Fax:    Dialysis Facility (if applicable)   Name:  Address:  Dialysis

## 2024-11-10 NOTE — DISCHARGE INSTR - DIET

## 2024-11-10 NOTE — PROGRESS NOTES
RN call out to [facility] pharmacy re: call in prescriptions to [facility].

## 2024-11-11 LAB
BACTERIA URNS QL MICRO: ABNORMAL
BILIRUB UR QL STRIP: ABNORMAL
CLARITY UR: CLEAR
COLOR UR: YELLOW
EKG ATRIAL RATE: 71 BPM
EKG P AXIS: 9 DEGREES
EKG P-R INTERVAL: 166 MS
EKG Q-T INTERVAL: 390 MS
EKG QRS DURATION: 74 MS
EKG QTC CALCULATION (BAZETT): 423 MS
EKG R AXIS: -18 DEGREES
EKG T AXIS: 0 DEGREES
EKG VENTRICULAR RATE: 71 BPM
EPI CELLS #/AREA URNS HPF: ABNORMAL /HPF (ref 0–5)
GLUCOSE UR STRIP-MCNC: 500 MG/DL
HGB UR QL STRIP.AUTO: NEGATIVE
KETONES UR STRIP-MCNC: ABNORMAL MG/DL
LEUKOCYTE ESTERASE UR QL STRIP: NEGATIVE
NITRITE UR QL STRIP: NEGATIVE
PH UR STRIP: 5.5 [PH] (ref 5–8)
PROT UR STRIP-MCNC: NEGATIVE MG/DL
RBC #/AREA URNS HPF: ABNORMAL /HPF (ref 0–2)
SP GR UR STRIP: 1.02 (ref 1–1.03)
UROBILINOGEN UR STRIP-ACNC: NORMAL EU/DL (ref 0–1)
WBC #/AREA URNS HPF: ABNORMAL /HPF (ref 0–5)

## 2024-11-11 PROCEDURE — 93010 ELECTROCARDIOGRAM REPORT: CPT | Performed by: INTERNAL MEDICINE

## 2024-11-13 RX ORDER — EMPAGLIFLOZIN AND LINAGLIPTIN 25; 5 MG/1; MG/1
1 TABLET, FILM COATED ORAL DAILY
COMMUNITY

## 2024-11-13 NOTE — PROGRESS NOTES
[facility] PRE-ADMISSION TESTING INSTRUCTIONS    The Preadmission Testing patient is instructed accordingly using the following criteria (check applicable):    ARRIVAL INSTRUCTIONS:  [x] Parking the day of Surgery is located in the Main Entrance lot.  Upon entering the door, make an immediate right to the surgery reception desk    [x] Bring photo ID and insurance card    [] Bring in a copy of Living will or Durable Power of  papers.    [x] Please be sure to arrange for responsible adult to provide transportation to and from the hospital    [x] Please arrange for responsible adult to be with you for the 24 hour period post procedure due to having anesthesia    [x] If you awake am of surgery not feeling well or have temperature >100 please call [phone]    GENERAL INSTRUCTIONS:    [x] No solid food after midnight. May have minimal (<8 ounces) clear liquids until 4 hours prior to surgery. NPO time 0900.     Examples include water, fruit juices (no pulp), jello, popsicles, black coffee or tea, beef or chicken broth.               No gum, candy or mints.    [x] You may brush your teeth, but do not swallow any water    [] Take medications as instructed with 1-2 oz of water    [x] Stop herbal supplements and vitamins 5 days prior to procedure    [x] Follow preop dosing of blood thinners per physician instructions    [] Take 1/2 dose of evening insulin, but no insulin after midnight    [x] No oral diabetic medications after midnight    [x] If diabetic and have low blood sugar or feel symptomatic, take 1-2oz apple juice only    [] Bring inhalers day of surgery    [] Bring C-PAP/ Bi-Pap day of surgery    [] Bring urine specimen day of surgery    [x] Shower or bath with soap, lather and rinse well, AM of Surgery, no lotion, powders or creams to surgical site    [] Follow bowel prep as instructed per surgeon    [x] No tobacco products within 24 hours of surgery     [x] No alcohol or

## 2024-11-14 ENCOUNTER — TELEPHONE (OUTPATIENT)
Dept: PODIATRY | Age: 69
End: 2024-11-14

## 2024-11-14 LAB
MICROORGANISM SPEC CULT: NORMAL
MICROORGANISM SPEC CULT: NORMAL
SERVICE CMNT-IMP: NORMAL
SERVICE CMNT-IMP: NORMAL
SPECIMEN DESCRIPTION: NORMAL
SPECIMEN DESCRIPTION: NORMAL

## 2024-11-14 NOTE — TELEPHONE ENCOUNTER
Patient called in today reports getting a call from [employer] that her auth for surgery on Monday has  and needs a new one. Patient would like to know if she has to call her PCP for this auth or does the office take care of this for her. Best call back is [phone].

## 2024-11-14 NOTE — TELEPHONE ENCOUNTER
Prior Authorization Form:    DEMOGRAPHICS:                     Patient Name:  Sallie Gao  Patient :  1955            Insurance:  Payor: AETNA / Plan: AETNA / Product Type: *No Product type* /   Insurance ID Number:    Payer/Plan Subscr  Sex Relation Sub. Ins. ID Effective Group Num   1. AETNA - AETNA SALLIE GAO 1955 Female Self [id number]                                   [address]   2. MEDICARE - [place]* SALLIE GAO 1955 Female Self [id number]                                    [address]         DIAGNOSIS & PROCEDURE:                       Procedure/Operation:   Cortisone Injections under Fluoroscopy Tarsometatarsal Joints 2 & 3, Right Foot            CPT Code: 99863    Diagnosis:  Synovitis and tenosynovitis, unspecified     ICD10 Code: M65.9    Location:  [facility] OR    Surgeon:  Dr Sai Hudson DPM    SCHEDULING INFORMATION:                          Date: 2024    Time: 1:00pm              Anesthesia:  LMAC                                                       Status:  Outpatient        Special Comments:  Large C-Arm       Vendor:      Notified []    Length of Surgery (with 30 min clean up time): 15 minutes    Medical clearance: Medical Clearance Requested    Pre-Op Labs:     Electronically signed by Jennifer Srinivasan MA on 2024 at 8:23 AM

## 2024-11-15 NOTE — PROGRESS NOTES
Attempted to call Dr Hudson's office x3 to obtain H & P for surgery on 11/18/24. Message left for need of  H& P with no return phone call . Liza at central answering service answered phone when office called and stated she spoke with someone in office and office did not receive H & P from Dr Galarza.     Dr Galarza's office notified of need for H & P and stated she will fax last office note to PAT   99

## 2024-11-16 NOTE — ANESTHESIA PRE PROCEDURE
Department of Anesthesiology  Preprocedure Note       Name:  Sallie Gao   Age:  69 y.o.  :  1955                                          MRN:  [id number]         Date:  11/15/2024      Surgeon: Surgeon(s):  Sai Hudson DPM    Procedure: Procedure(s):  CORTISONE INJECTION UNDER FLUOROSCOPY TARSOMETATARSAL JOINT 2 AND JOINT 3 RIGHT FOOT    Medications prior to admission:   Prior to Admission medications    Medication Sig Start Date End Date Taking? Authorizing Provider   empagliflozin (JARDIANCE) 25 MG tablet Take 1 tablet by mouth daily   Yes ProviderBrayden MD   Empagliflozin-linaGLIPtin (GLYXAMBI) 25-5 MG TABS Take 1 tablet by mouth Daily   Yes Brayden Christy MD   metroNIDAZOLE (FLAGYL) 500 MG tablet Take 1 tablet by mouth every 8 hours for 7 days 11/10/24 11/17/24  Virgilio King MD   ciprofloxacin (CIPRO) 500 MG tablet Take 1 tablet by mouth every 12 hours for 7 days 11/10/24 11/17/24  Virgilio King MD   Azelastine HCl 137 MCG/SPRAY SOLN INSTILL 1-2 SPRAYS INTO EACH NOSTRIL TWICE DAILY AS NEEDED FOR RHINITIS 24   Justo Salguero, APRN - CNP   sodium chloride (ALTAMIST SPRAY) 0.65 % nasal spray 2 sprays by Nasal route 4 times daily  Patient taking differently: 2 sprays by Nasal route as needed 23   Justo Salguero APRN - CNP   aspirin 81 MG chewable tablet Take 1 tablet by mouth daily 2/15/20   Raulito Fitch MD   zolpidem (AMBIEN CR) 12.5 MG extended release tablet Take 1 tablet by mouth nightly as needed for Sleep.    Provider, MD Brayden   ezetimibe (ZETIA) 10 MG tablet Take 1 tablet by mouth daily generic 19   Brando Hinojosa MD   gabapentin (NEURONTIN) 600 MG tablet Take 1 tablet by mouth nightly for 30 days. 19  Zohreh Cazares DO       Current medications:    No current facility-administered medications for this encounter.     Current Outpatient Medications   Medication Sig Dispense Refill    empagliflozin

## 2024-11-18 ENCOUNTER — HOSPITAL ENCOUNTER (OUTPATIENT)
Dept: GENERAL RADIOLOGY | Age: 69
Discharge: HOME | End: 2024-11-20
Attending: PODIATRIST
Payer: COMMERCIAL

## 2024-11-18 ENCOUNTER — HOSPITAL ENCOUNTER (OUTPATIENT)
Age: 69
Setting detail: OUTPATIENT SURGERY
Discharge: HOME | End: 2024-11-18
Attending: PODIATRIST | Admitting: PODIATRIST
Payer: COMMERCIAL

## 2024-11-18 VITALS
TEMPERATURE: 97 F | SYSTOLIC BLOOD PRESSURE: 125 MMHG | RESPIRATION RATE: 16 BRPM | BODY MASS INDEX: 25.99 KG/M2 | OXYGEN SATURATION: 100 % | DIASTOLIC BLOOD PRESSURE: 62 MMHG | HEART RATE: 52 BPM | WEIGHT: 156 LBS | HEIGHT: 65 IN

## 2024-11-18 DIAGNOSIS — R52 PAIN: ICD-10-CM

## 2024-11-18 PROCEDURE — 7100000010 HC PHASE II RECOVERY - FIRST 15 MIN: Performed by: PODIATRIST

## 2024-11-18 PROCEDURE — 3600000002 HC SURGERY LEVEL 2 BASE: Performed by: PODIATRIST

## 2024-11-18 PROCEDURE — 6360000002 HC RX W HCPCS: Performed by: PODIATRIST

## 2024-11-18 PROCEDURE — 2580000003 HC RX 258: Performed by: PODIATRIST

## 2024-11-18 PROCEDURE — 3700000001 HC ADD 15 MINUTES (ANESTHESIA): Performed by: PODIATRIST

## 2024-11-18 PROCEDURE — 2500000003 HC RX 250 WO HCPCS

## 2024-11-18 PROCEDURE — 3700000000 HC ANESTHESIA ATTENDED CARE: Performed by: PODIATRIST

## 2024-11-18 PROCEDURE — 2709999900 HC NON-CHARGEABLE SUPPLY: Performed by: PODIATRIST

## 2024-11-18 PROCEDURE — 3600000012 HC SURGERY LEVEL 2 ADDTL 15MIN: Performed by: PODIATRIST

## 2024-11-18 PROCEDURE — 6360000002 HC RX W HCPCS

## 2024-11-18 PROCEDURE — 7100000011 HC PHASE II RECOVERY - ADDTL 15 MIN: Performed by: PODIATRIST

## 2024-11-18 RX ORDER — PROPOFOL 10 MG/ML
INJECTION, EMULSION INTRAVENOUS
Status: DISCONTINUED | OUTPATIENT
Start: 2024-11-18 | End: 2024-11-18 | Stop reason: SDUPTHER

## 2024-11-18 RX ORDER — DEXMEDETOMIDINE HYDROCHLORIDE 100 UG/ML
INJECTION, SOLUTION INTRAVENOUS
Status: DISCONTINUED | OUTPATIENT
Start: 2024-11-18 | End: 2024-11-18 | Stop reason: SDUPTHER

## 2024-11-18 RX ORDER — SODIUM CHLORIDE 0.9 % (FLUSH) 0.9 %
5-40 SYRINGE (ML) INJECTION PRN
Status: DISCONTINUED | OUTPATIENT
Start: 2024-11-18 | End: 2024-11-18 | Stop reason: HOSPADM

## 2024-11-18 RX ORDER — FENTANYL CITRATE 50 UG/ML
INJECTION, SOLUTION INTRAMUSCULAR; INTRAVENOUS
Status: DISCONTINUED | OUTPATIENT
Start: 2024-11-18 | End: 2024-11-18 | Stop reason: SDUPTHER

## 2024-11-18 RX ORDER — MIDAZOLAM HYDROCHLORIDE 1 MG/ML
INJECTION, SOLUTION INTRAMUSCULAR; INTRAVENOUS
Status: DISCONTINUED | OUTPATIENT
Start: 2024-11-18 | End: 2024-11-18 | Stop reason: SDUPTHER

## 2024-11-18 RX ORDER — BUPIVACAINE HYDROCHLORIDE 5 MG/ML
INJECTION, SOLUTION EPIDURAL; INTRACAUDAL PRN
Status: DISCONTINUED | OUTPATIENT
Start: 2024-11-18 | End: 2024-11-18 | Stop reason: ALTCHOICE

## 2024-11-18 RX ORDER — SODIUM CHLORIDE 9 MG/ML
INJECTION, SOLUTION INTRAVENOUS PRN
Status: DISCONTINUED | OUTPATIENT
Start: 2024-11-18 | End: 2024-11-18 | Stop reason: HOSPADM

## 2024-11-18 RX ORDER — SODIUM CHLORIDE 0.9 % (FLUSH) 0.9 %
5-40 SYRINGE (ML) INJECTION EVERY 12 HOURS SCHEDULED
Status: DISCONTINUED | OUTPATIENT
Start: 2024-11-18 | End: 2024-11-18 | Stop reason: HOSPADM

## 2024-11-18 RX ORDER — DEXAMETHASONE SODIUM PHOSPHATE 10 MG/ML
INJECTION, SOLUTION INTRAMUSCULAR; INTRAVENOUS PRN
Status: DISCONTINUED | OUTPATIENT
Start: 2024-11-18 | End: 2024-11-18 | Stop reason: ALTCHOICE

## 2024-11-18 RX ORDER — METHYLPREDNISOLONE ACETATE 40 MG/ML
INJECTION, SUSPENSION INTRA-ARTICULAR; INTRALESIONAL; INTRAMUSCULAR; SOFT TISSUE PRN
Status: DISCONTINUED | OUTPATIENT
Start: 2024-11-18 | End: 2024-11-18 | Stop reason: ALTCHOICE

## 2024-11-18 RX ADMIN — WATER 2000 MG: 1 INJECTION INTRAMUSCULAR; INTRAVENOUS; SUBCUTANEOUS at 13:15

## 2024-11-18 RX ADMIN — PROPOFOL 75 MCG/KG/MIN: 10 INJECTION, EMULSION INTRAVENOUS at 13:17

## 2024-11-18 RX ADMIN — DEXMEDETOMIDINE 4 MCG: 100 INJECTION, SOLUTION INTRAVENOUS at 13:17

## 2024-11-18 RX ADMIN — FENTANYL CITRATE 50 MCG: 50 INJECTION, SOLUTION INTRAMUSCULAR; INTRAVENOUS at 13:17

## 2024-11-18 RX ADMIN — SODIUM CHLORIDE: 9 INJECTION, SOLUTION INTRAVENOUS at 13:09

## 2024-11-18 RX ADMIN — MIDAZOLAM 2 MG: 1 INJECTION INTRAMUSCULAR; INTRAVENOUS at 13:12

## 2024-11-18 ASSESSMENT — PAIN - FUNCTIONAL ASSESSMENT: PAIN_FUNCTIONAL_ASSESSMENT: 0-10

## 2024-11-18 ASSESSMENT — PAIN SCALES - GENERAL
PAINLEVEL_OUTOF10: 0

## 2024-11-18 NOTE — ANESTHESIA POSTPROCEDURE EVALUATION
Department of Anesthesiology  Postprocedure Note    Patient: Sallie Gao  MRN: [id number]  YOB: 1955  Date of evaluation: 11/18/2024    Procedure Summary       Date: 11/18/24 Room / Location: [address]    Anesthesia Start: 1309 Anesthesia Stop: 1332    Procedure: CORTISONE INJECTION UNDER FLUOROSCOPY TARSOMETATARSAL JOINT 2 AND JOINT 3 RIGHT FOOT (Right: Foot) Diagnosis:       Synovitis and tenosynovitis, unspecified      (Synovitis and tenosynovitis, unspecified [M65.90])    Surgeons: Sai Hudson DPM Responsible Provider: Carlos Eduardo Byers DO    Anesthesia Type: MAC ASA Status: 3            Anesthesia Type: MAC    Jovan Phase I: Jovan Score: 10    Jovan Phase II: Jovan Score: 10    Anesthesia Post Evaluation    Patient location during evaluation: bedside  Patient participation: complete - patient participated  Level of consciousness: awake  Pain score: 1  Airway patency: patent  Nausea & Vomiting: no vomiting and no nausea  Cardiovascular status: hemodynamically stable  Respiratory status: acceptable  Hydration status: stable  Comments: Patient seen and examined.  Progressing as expected.  No anesthetic related questions or concerns at this time.  Multimodal analgesia pain management approach  Pain management: adequate    No notable events documented.      Agree with above.   BRONWYN Washington - CRNA - 11/18/2024 at 2:45 PM

## 2024-11-18 NOTE — OP NOTE
Operative Note      Patient: Sallie Gao  YOB: 1955  MRN: [id number]    Date of Procedure: 11/18/2024    Pre-Op Diagnosis Codes:      * Synovitis and tenosynovitis, unspecified [M65.90]  #1 painful tarsometatarsal joint tenosynovitis and arthritis right second and third tarsometatarsal joint    Post-Op Diagnosis: Same       Procedure(s):  CORTISONE INJECTION UNDER FLUOROSCOPY TARSOMETATARSAL JOINT 2 AND JOINT 3 RIGHT FOOT    Surgeon(s):  Sai Hudson DPM    Assistant:   Resident: Anny Willoughby DPM    Anesthesia: Monitor Anesthesia Care    Estimated Blood Loss (mL): Minimal    Complications: None    Specimens:   * No specimens in log *    Implants:  * No implants in log *      Drains: * No LDAs found *    Findings:  Infection Present At Time Of Surgery (PATOS) (choose all levels that have infection present):  No infection present        Indication for procedure  Sallie was seen in the office and radiographs reviewed right foot.  Consistent with joint narrowing and arthritic changes as well as tenosynovitis right second and third tarsometatarsal joints.  We did discuss cortisone injections in the office versus in the operating room under fluoroscopy.  Wishing to proceed with injections.  There is a risk of continued pain she is understanding of this.  Risk for additional surgeries going forward.  She is understanding.  All risk and complications discussed.    Detailed Description of Procedure:   Following satisfactory preoperative evaluation the patient was brought back in the OR and placed on the OR table in a supine position.  MAC sedation administered by the anesthesia team.  Following sedation no tourniquet was used.  Foot was prepped and draped in usual sterile and aseptic manner.  Attention was directed under sterile conditions first beginning at the second tarsometatarsal joint on the right foot.  AP radiographs were used with fluoroscopy throughout the entire procedure.  Attention was

## 2024-12-03 ENCOUNTER — OFFICE VISIT (OUTPATIENT)
Dept: PODIATRY | Age: 69
End: 2024-12-03
Payer: COMMERCIAL

## 2024-12-03 VITALS — HEIGHT: 65 IN | WEIGHT: 156 LBS | BODY MASS INDEX: 25.99 KG/M2

## 2024-12-03 DIAGNOSIS — M19.071 ARTHRITIS OF MIDTARSAL JOINT OF RIGHT FOOT: ICD-10-CM

## 2024-12-03 DIAGNOSIS — M65.971 TENOSYNOVITIS OF RIGHT FOOT: Primary | ICD-10-CM

## 2024-12-03 PROCEDURE — 99213 OFFICE O/P EST LOW 20 MIN: CPT | Performed by: PODIATRIST

## 2024-12-03 PROCEDURE — 1123F ACP DISCUSS/DSCN MKR DOCD: CPT | Performed by: PODIATRIST

## 2024-12-03 NOTE — PROGRESS NOTES
Patient here for a 2 week post op check on her cortisone injection under fluoroscopy tarsometatarsal joint 2 & 3 of the right foot.  She states that she is doing great and foots feels great.  Last visit, Neil Galarza MD, 11/05/2024.    Electronically signed by Sai Hudson DPM on 12/4/2024 at 8:10 AM      CC:    Status post injections under fluoroscopy date of procedure 11/18/2024 right second and third tarsometatarsal joints    HPI:   Status post injections under fluoroscopy date of procedure 11/18/2024 right second and third tarsometatarsal joints  Significant improvement since injections.  She is pain-free today.  Denies any foot or ankle issues.  She does state that she has been unable to walk around the house pain-free.  She is very pleased with progression.  No additional pedal complaints.    ROS:  Const: Denies constitutional symptoms  Musculo: Denies symptoms other than stated above  Skin: Denies symptoms other than stated above       Current Outpatient Medications:     empagliflozin (JARDIANCE) 25 MG tablet, Take 1 tablet by mouth daily, Disp: , Rfl:     Empagliflozin-linaGLIPtin (GLYXAMBI) 25-5 MG TABS, Take 1 tablet by mouth Daily, Disp: , Rfl:     Azelastine HCl 137 MCG/SPRAY SOLN, INSTILL 1-2 SPRAYS INTO EACH NOSTRIL TWICE DAILY AS NEEDED FOR RHINITIS, Disp: 90 mL, Rfl: 1    sodium chloride (ALTAMIST SPRAY) 0.65 % nasal spray, 2 sprays by Nasal route 4 times daily (Patient taking differently: 2 sprays by Nasal route as needed), Disp: 3 each, Rfl: 3    aspirin 81 MG chewable tablet, Take 1 tablet by mouth daily, Disp: 30 tablet, Rfl: 3    zolpidem (AMBIEN CR) 12.5 MG extended release tablet, Take 1 tablet by mouth nightly as needed for Sleep., Disp: , Rfl:     ezetimibe (ZETIA) 10 MG tablet, Take 1 tablet by mouth daily generic, Disp: 30 tablet, Rfl: 3    gabapentin (NEURONTIN) 600 MG tablet, Take 1 tablet by mouth nightly for 30 days., Disp: 90 tablet, Rfl: 2  Allergies   Allergen Reactions

## 2024-12-10 ENCOUNTER — HOSPITAL ENCOUNTER (OUTPATIENT)
Age: 69
Discharge: HOME | End: 2024-12-12

## 2024-12-10 PROCEDURE — 88305 TISSUE EXAM BY PATHOLOGIST: CPT

## 2024-12-12 LAB — SURGICAL PATHOLOGY REPORT: NORMAL

## 2025-02-03 ENCOUNTER — HOSPITAL ENCOUNTER (OUTPATIENT)
Dept: PREADMISSION TESTING | Age: 70
Setting detail: OUTPATIENT SURGERY
Discharge: HOME | End: 2025-02-03
Payer: MEDICARE

## 2025-02-03 VITALS
TEMPERATURE: 97.8 F | WEIGHT: 150 LBS | HEIGHT: 65 IN | OXYGEN SATURATION: 97 % | HEART RATE: 62 BPM | SYSTOLIC BLOOD PRESSURE: 132 MMHG | BODY MASS INDEX: 24.99 KG/M2 | DIASTOLIC BLOOD PRESSURE: 65 MMHG

## 2025-02-03 LAB
ABO + RH BLD: NORMAL
ANION GAP SERPL CALCULATED.3IONS-SCNC: 13 MMOL/L (ref 7–16)
ARM BAND NUMBER: NORMAL
BLOOD BANK SAMPLE EXPIRATION: NORMAL
BLOOD GROUP ANTIBODIES SERPL: NEGATIVE
BUN SERPL-MCNC: 25 MG/DL (ref 6–23)
CALCIUM SERPL-MCNC: 10.1 MG/DL (ref 8.6–10.2)
CHLORIDE SERPL-SCNC: 101 MMOL/L (ref 98–107)
CO2 SERPL-SCNC: 26 MMOL/L (ref 22–29)
CREAT SERPL-MCNC: 0.9 MG/DL (ref 0.5–1)
ERYTHROCYTE [DISTWIDTH] IN BLOOD BY AUTOMATED COUNT: 14.2 % (ref 11.5–15)
GFR, ESTIMATED: 72 ML/MIN/1.73M2
GLUCOSE SERPL-MCNC: 100 MG/DL (ref 74–99)
HCT VFR BLD AUTO: 49 % (ref 34–48)
HGB BLD-MCNC: 15.3 G/DL (ref 11.5–15.5)
MCH RBC QN AUTO: 27.7 PG (ref 26–35)
MCHC RBC AUTO-ENTMCNC: 31.2 G/DL (ref 32–34.5)
MCV RBC AUTO: 88.6 FL (ref 80–99.9)
PLATELET # BLD AUTO: 296 K/UL (ref 130–450)
PMV BLD AUTO: 10.6 FL (ref 7–12)
POTASSIUM SERPL-SCNC: 4.3 MMOL/L (ref 3.5–5)
RBC # BLD AUTO: 5.53 M/UL (ref 3.5–5.5)
SODIUM SERPL-SCNC: 140 MMOL/L (ref 132–146)
WBC OTHER # BLD: 5.9 K/UL (ref 4.5–11.5)

## 2025-02-03 PROCEDURE — 36415 COLL VENOUS BLD VENIPUNCTURE: CPT

## 2025-02-03 PROCEDURE — 80048 BASIC METABOLIC PNL TOTAL CA: CPT

## 2025-02-03 PROCEDURE — 85027 COMPLETE CBC AUTOMATED: CPT

## 2025-02-03 PROCEDURE — 86900 BLOOD TYPING SEROLOGIC ABO: CPT

## 2025-02-03 PROCEDURE — 86850 RBC ANTIBODY SCREEN: CPT

## 2025-02-03 PROCEDURE — 86901 BLOOD TYPING SEROLOGIC RH(D): CPT

## 2025-02-03 PROCEDURE — 87081 CULTURE SCREEN ONLY: CPT

## 2025-02-03 RX ORDER — BEMPEDOIC ACID AND EZETIMIBE 180; 10 MG/1; MG/1
TABLET, FILM COATED ORAL
Status: ON HOLD | COMMUNITY

## 2025-02-03 NOTE — PROGRESS NOTES
Spoke with Dr King, patient does not need marked for upcoming colon surgery and medical clearance is not required.

## 2025-02-03 NOTE — PROGRESS NOTES
[facility] PRE-ADMISSION TESTING INSTRUCTIONS    The Preadmission Testing patient is instructed accordingly using the following criteria (check applicable):    ARRIVAL INSTRUCTIONS:     Arrival Time:    [x] Parking the day of Surgery is located in the Main Entrance lot.  Upon entering through the main entrance (Entrance A) make an immediate right to the surgery reception desk    [x] Bring photo ID and insurance card    [x] Bring in a copy of Living will or Durable Power of  papers.    [x] Please be sure to arrange for a responsible adult to provide transportation to and from the hospital    [x] Please arrange for a responsible adult to be with you for the 24 hour period post procedure, due to having anesthesia    [x] Please notify surgeon if you develop any illness between now and time of surgery (cold, cough, sore throat, fever, nausea, vomiting) or any signs of infections  including skin, wounds, and dental.    [x] If you awake am of surgery not feeling well or have temperature >100 please call [phone].    GENERAL INSTRUCTIONS:    [x] No solid foods after midnight, may have up to 8oz of water until 4 hours prior to surgery. No gum, no candy, no mints.         [x] You may brush your teeth    [x] Take medications as instructed     [x] Stop herbal supplements and vitamins 5 days prior to procedure    [x] Follow preop dosing of blood thinners per physician instructions    [x] Please do not wear any nail polish, make up, hair products, body spray, aftershave, cologne or perfume on the day of surgery    [x] Jewelry, body piercings, eyeglasses, contact lenses and dentures are not permitted into surgery (bring cases)    [x] Follow bowel prep as instructed per surgeon    [x] No tobacco products within 24 hours of surgery     [x] No alcohol or illegal drug use, marijuana included, within 24 hours of surgery.    [x] You can expect a call the business day prior to procedure to notify

## 2025-02-03 NOTE — DISCHARGE INSTRUCTIONS
Addend     Delete     Tag     Copy  Schoenfeld, Brittany L, RN  Registered Nurse     Progress Notes     Signed     Date of Service: 2/3/2025  1:30 PM     Signed       [facility] PRE-ADMISSION TESTING INSTRUCTIONS     The Preadmission Testing patient is instructed accordingly using the following criteria (check applicable):     ARRIVAL INSTRUCTIONS:      Arrival Time: 1000     [x] Parking the day of Surgery is located in the Main Entrance lot.  Upon entering through the main entrance (Entrance A) make an immediate right to the surgery reception desk     [x] Bring photo ID and insurance card     [x] Bring in a copy of Living will or Durable Power of  papers.     [x] Please be sure to arrange for a responsible adult to provide transportation to and from the hospital     [x] Please arrange for a responsible adult to be with you for the 24 hour period post procedure, due to having anesthesia     [x] Please notify surgeon if you develop any illness between now and time of surgery (cold, cough, sore throat, fever, nausea, vomiting) or any signs of infections  including skin, wounds, and dental.     [x] If you awake am of surgery not feeling well or have temperature >100 please call [phone].     GENERAL INSTRUCTIONS:     [x] No solid foods after midnight, may have up to 8oz of water until 4 hours prior to surgery. No gum, no candy, no mints.         [x] You may brush your teeth     [x] Take medications as instructed      [x] Stop herbal supplements and vitamins 5 days prior to procedure     [x] Follow preop dosing of blood thinners per physician instructions     [x] Please do not wear any nail polish, make up, hair products, body spray, aftershave, cologne or perfume on the day of surgery     [x] Jewelry, body piercings, eyeglasses, contact lenses and dentures are not permitted into surgery (bring cases)     [x] Follow bowel prep as instructed per surgeon     [x] No tobacco

## 2025-02-05 ENCOUNTER — ANESTHESIA EVENT (OUTPATIENT)
Dept: OPERATING ROOM | Age: 70
End: 2025-02-05
Payer: MEDICARE

## 2025-02-05 LAB
MICROORGANISM SPEC CULT: NORMAL
SPECIMEN DESCRIPTION: NORMAL

## 2025-02-07 ENCOUNTER — ANESTHESIA (OUTPATIENT)
Dept: OPERATING ROOM | Age: 70
End: 2025-02-07
Payer: MEDICARE

## 2025-02-07 ENCOUNTER — HOSPITAL ENCOUNTER (INPATIENT)
Age: 70
LOS: 7 days | Discharge: HOME | DRG: 330 | End: 2025-02-14
Attending: STUDENT IN AN ORGANIZED HEALTH CARE EDUCATION/TRAINING PROGRAM | Admitting: STUDENT IN AN ORGANIZED HEALTH CARE EDUCATION/TRAINING PROGRAM
Payer: MEDICARE

## 2025-02-07 DIAGNOSIS — K57.20 DIVERTICULITIS OF COLON WITH PERFORATION: ICD-10-CM

## 2025-02-07 DIAGNOSIS — K57.92 DIVERTICULITIS: Primary | ICD-10-CM

## 2025-02-07 LAB
GLUCOSE BLD-MCNC: 108 MG/DL (ref 74–99)
GLUCOSE BLD-MCNC: 168 MG/DL (ref 74–99)

## 2025-02-07 PROCEDURE — 1200000000 HC SEMI PRIVATE

## 2025-02-07 PROCEDURE — 2709999900 HC NON-CHARGEABLE SUPPLY: Performed by: STUDENT IN AN ORGANIZED HEALTH CARE EDUCATION/TRAINING PROGRAM

## 2025-02-07 PROCEDURE — 2500000003 HC RX 250 WO HCPCS: Performed by: STUDENT IN AN ORGANIZED HEALTH CARE EDUCATION/TRAINING PROGRAM

## 2025-02-07 PROCEDURE — C1758 CATHETER, URETERAL: HCPCS | Performed by: STUDENT IN AN ORGANIZED HEALTH CARE EDUCATION/TRAINING PROGRAM

## 2025-02-07 PROCEDURE — 3700000000 HC ANESTHESIA ATTENDED CARE: Performed by: STUDENT IN AN ORGANIZED HEALTH CARE EDUCATION/TRAINING PROGRAM

## 2025-02-07 PROCEDURE — 7100000001 HC PACU RECOVERY - ADDTL 15 MIN: Performed by: STUDENT IN AN ORGANIZED HEALTH CARE EDUCATION/TRAINING PROGRAM

## 2025-02-07 PROCEDURE — 2580000003 HC RX 258

## 2025-02-07 PROCEDURE — 2700000000 HC OXYGEN THERAPY PER DAY

## 2025-02-07 PROCEDURE — 6370000000 HC RX 637 (ALT 250 FOR IP)

## 2025-02-07 PROCEDURE — 7100000000 HC PACU RECOVERY - FIRST 15 MIN: Performed by: STUDENT IN AN ORGANIZED HEALTH CARE EDUCATION/TRAINING PROGRAM

## 2025-02-07 PROCEDURE — 3600000003 HC SURGERY LEVEL 3 BASE: Performed by: STUDENT IN AN ORGANIZED HEALTH CARE EDUCATION/TRAINING PROGRAM

## 2025-02-07 PROCEDURE — 2720000010 HC SURG SUPPLY STERILE: Performed by: STUDENT IN AN ORGANIZED HEALTH CARE EDUCATION/TRAINING PROGRAM

## 2025-02-07 PROCEDURE — 82962 GLUCOSE BLOOD TEST: CPT

## 2025-02-07 PROCEDURE — 0TJB8ZZ INSPECTION OF BLADDER, VIA NATURAL OR ARTIFICIAL OPENING ENDOSCOPIC: ICD-10-PCS | Performed by: STUDENT IN AN ORGANIZED HEALTH CARE EDUCATION/TRAINING PROGRAM

## 2025-02-07 PROCEDURE — 3700000001 HC ADD 15 MINUTES (ANESTHESIA): Performed by: STUDENT IN AN ORGANIZED HEALTH CARE EDUCATION/TRAINING PROGRAM

## 2025-02-07 PROCEDURE — 88305 TISSUE EXAM BY PATHOLOGIST: CPT

## 2025-02-07 PROCEDURE — 6370000000 HC RX 637 (ALT 250 FOR IP): Performed by: STUDENT IN AN ORGANIZED HEALTH CARE EDUCATION/TRAINING PROGRAM

## 2025-02-07 PROCEDURE — 6360000002 HC RX W HCPCS: Performed by: STUDENT IN AN ORGANIZED HEALTH CARE EDUCATION/TRAINING PROGRAM

## 2025-02-07 PROCEDURE — 0TQB8ZZ REPAIR BLADDER, VIA NATURAL OR ARTIFICIAL OPENING ENDOSCOPIC: ICD-10-PCS | Performed by: STUDENT IN AN ORGANIZED HEALTH CARE EDUCATION/TRAINING PROGRAM

## 2025-02-07 PROCEDURE — G0378 HOSPITAL OBSERVATION PER HR: HCPCS

## 2025-02-07 PROCEDURE — 2500000003 HC RX 250 WO HCPCS

## 2025-02-07 PROCEDURE — 6360000002 HC RX W HCPCS: Performed by: NURSE ANESTHETIST, CERTIFIED REGISTERED

## 2025-02-07 PROCEDURE — 2580000003 HC RX 258: Performed by: NURSE ANESTHETIST, CERTIFIED REGISTERED

## 2025-02-07 PROCEDURE — 3600000013 HC SURGERY LEVEL 3 ADDTL 15MIN: Performed by: STUDENT IN AN ORGANIZED HEALTH CARE EDUCATION/TRAINING PROGRAM

## 2025-02-07 PROCEDURE — 88304 TISSUE EXAM BY PATHOLOGIST: CPT

## 2025-02-07 PROCEDURE — 0T9B80Z DRAINAGE OF BLADDER WITH DRAINAGE DEVICE, VIA NATURAL OR ARTIFICIAL OPENING ENDOSCOPIC: ICD-10-PCS | Performed by: STUDENT IN AN ORGANIZED HEALTH CARE EDUCATION/TRAINING PROGRAM

## 2025-02-07 PROCEDURE — 0DBN0ZZ EXCISION OF SIGMOID COLON, OPEN APPROACH: ICD-10-PCS | Performed by: STUDENT IN AN ORGANIZED HEALTH CARE EDUCATION/TRAINING PROGRAM

## 2025-02-07 PROCEDURE — 6360000002 HC RX W HCPCS

## 2025-02-07 PROCEDURE — 2500000003 HC RX 250 WO HCPCS: Performed by: NURSE ANESTHETIST, CERTIFIED REGISTERED

## 2025-02-07 PROCEDURE — 88307 TISSUE EXAM BY PATHOLOGIST: CPT

## 2025-02-07 RX ORDER — LABETALOL HYDROCHLORIDE 5 MG/ML
5 INJECTION, SOLUTION INTRAVENOUS
Status: DISCONTINUED | OUTPATIENT
Start: 2025-02-07 | End: 2025-02-07 | Stop reason: HOSPADM

## 2025-02-07 RX ORDER — SODIUM CHLORIDE 0.9 % (FLUSH) 0.9 %
5-40 SYRINGE (ML) INJECTION PRN
Status: DISCONTINUED | OUTPATIENT
Start: 2025-02-07 | End: 2025-02-14 | Stop reason: HOSPADM

## 2025-02-07 RX ORDER — MAGNESIUM SULFATE IN WATER 40 MG/ML
2000 INJECTION, SOLUTION INTRAVENOUS PRN
Status: DISCONTINUED | OUTPATIENT
Start: 2025-02-07 | End: 2025-02-07

## 2025-02-07 RX ORDER — ONDANSETRON 2 MG/ML
4 INJECTION INTRAMUSCULAR; INTRAVENOUS EVERY 6 HOURS PRN
Status: DISCONTINUED | OUTPATIENT
Start: 2025-02-07 | End: 2025-02-07

## 2025-02-07 RX ORDER — HYDRALAZINE HYDROCHLORIDE 20 MG/ML
10 INJECTION INTRAMUSCULAR; INTRAVENOUS EVERY 30 MIN PRN
Status: DISCONTINUED | OUTPATIENT
Start: 2025-02-07 | End: 2025-02-14 | Stop reason: HOSPADM

## 2025-02-07 RX ORDER — LIDOCAINE HYDROCHLORIDE 20 MG/ML
INJECTION, SOLUTION EPIDURAL; INFILTRATION; INTRACAUDAL; PERINEURAL
Status: DISCONTINUED | OUTPATIENT
Start: 2025-02-07 | End: 2025-02-07 | Stop reason: SDUPTHER

## 2025-02-07 RX ORDER — MIDAZOLAM HYDROCHLORIDE 1 MG/ML
INJECTION, SOLUTION INTRAMUSCULAR; INTRAVENOUS
Status: DISCONTINUED | OUTPATIENT
Start: 2025-02-07 | End: 2025-02-07 | Stop reason: SDUPTHER

## 2025-02-07 RX ORDER — DIPHENHYDRAMINE HYDROCHLORIDE 50 MG/ML
12.5 INJECTION INTRAMUSCULAR; INTRAVENOUS
Status: DISCONTINUED | OUTPATIENT
Start: 2025-02-07 | End: 2025-02-07 | Stop reason: HOSPADM

## 2025-02-07 RX ORDER — POTASSIUM CHLORIDE 1500 MG/1
40 TABLET, EXTENDED RELEASE ORAL PRN
Status: DISCONTINUED | OUTPATIENT
Start: 2025-02-07 | End: 2025-02-07

## 2025-02-07 RX ORDER — MAGNESIUM SULFATE IN WATER 40 MG/ML
2000 INJECTION, SOLUTION INTRAVENOUS PRN
Status: DISCONTINUED | OUTPATIENT
Start: 2025-02-07 | End: 2025-02-14 | Stop reason: HOSPADM

## 2025-02-07 RX ORDER — SODIUM CHLORIDE, SODIUM LACTATE, POTASSIUM CHLORIDE, CALCIUM CHLORIDE 600; 310; 30; 20 MG/100ML; MG/100ML; MG/100ML; MG/100ML
INJECTION, SOLUTION INTRAVENOUS CONTINUOUS
Status: ACTIVE | OUTPATIENT
Start: 2025-02-07 | End: 2025-02-08

## 2025-02-07 RX ORDER — SODIUM CHLORIDE 0.9 % (FLUSH) 0.9 %
5-40 SYRINGE (ML) INJECTION EVERY 12 HOURS SCHEDULED
Status: DISCONTINUED | OUTPATIENT
Start: 2025-02-07 | End: 2025-02-14 | Stop reason: HOSPADM

## 2025-02-07 RX ORDER — POLYETHYLENE GLYCOL 3350 17 G/17G
17 POWDER, FOR SOLUTION ORAL DAILY PRN
Status: DISCONTINUED | OUTPATIENT
Start: 2025-02-07 | End: 2025-02-07

## 2025-02-07 RX ORDER — SODIUM CHLORIDE 0.9 % (FLUSH) 0.9 %
5-40 SYRINGE (ML) INJECTION EVERY 12 HOURS SCHEDULED
Status: DISCONTINUED | OUTPATIENT
Start: 2025-02-07 | End: 2025-02-07 | Stop reason: HOSPADM

## 2025-02-07 RX ORDER — ACETAMINOPHEN 650 MG/1
650 SUPPOSITORY RECTAL EVERY 6 HOURS PRN
Status: DISCONTINUED | OUTPATIENT
Start: 2025-02-07 | End: 2025-02-07

## 2025-02-07 RX ORDER — PROPOFOL 10 MG/ML
INJECTION, EMULSION INTRAVENOUS
Status: DISCONTINUED | OUTPATIENT
Start: 2025-02-07 | End: 2025-02-07 | Stop reason: SDUPTHER

## 2025-02-07 RX ORDER — METRONIDAZOLE 500 MG/100ML
500 INJECTION, SOLUTION INTRAVENOUS ONCE
Status: COMPLETED | OUTPATIENT
Start: 2025-02-07 | End: 2025-02-07

## 2025-02-07 RX ORDER — SODIUM CHLORIDE 0.9 % (FLUSH) 0.9 %
5-40 SYRINGE (ML) INJECTION PRN
Status: DISCONTINUED | OUTPATIENT
Start: 2025-02-07 | End: 2025-02-07 | Stop reason: HOSPADM

## 2025-02-07 RX ORDER — POLYETHYLENE GLYCOL 3350 17 G/17G
17 POWDER, FOR SOLUTION ORAL DAILY PRN
Status: DISCONTINUED | OUTPATIENT
Start: 2025-02-07 | End: 2025-02-10

## 2025-02-07 RX ORDER — POTASSIUM CHLORIDE 7.45 MG/ML
10 INJECTION INTRAVENOUS PRN
Status: DISCONTINUED | OUTPATIENT
Start: 2025-02-07 | End: 2025-02-14 | Stop reason: HOSPADM

## 2025-02-07 RX ORDER — SODIUM CHLORIDE 9 MG/ML
INJECTION, SOLUTION INTRAVENOUS PRN
Status: DISCONTINUED | OUTPATIENT
Start: 2025-02-07 | End: 2025-02-07

## 2025-02-07 RX ORDER — POTASSIUM CHLORIDE 1500 MG/1
40 TABLET, EXTENDED RELEASE ORAL PRN
Status: DISCONTINUED | OUTPATIENT
Start: 2025-02-07 | End: 2025-02-14 | Stop reason: HOSPADM

## 2025-02-07 RX ORDER — MEPERIDINE HYDROCHLORIDE 25 MG/ML
12.5 INJECTION INTRAMUSCULAR; INTRAVENOUS; SUBCUTANEOUS
Status: DISCONTINUED | OUTPATIENT
Start: 2025-02-07 | End: 2025-02-07 | Stop reason: HOSPADM

## 2025-02-07 RX ORDER — KETAMINE HYDROCHLORIDE 10 MG/ML
INJECTION, SOLUTION INTRAMUSCULAR; INTRAVENOUS
Status: DISCONTINUED | OUTPATIENT
Start: 2025-02-07 | End: 2025-02-07 | Stop reason: SDUPTHER

## 2025-02-07 RX ORDER — BUPIVACAINE HYDROCHLORIDE AND EPINEPHRINE 5; 5 MG/ML; UG/ML
INJECTION, SOLUTION EPIDURAL; INTRACAUDAL; PERINEURAL PRN
Status: DISCONTINUED | OUTPATIENT
Start: 2025-02-07 | End: 2025-02-07 | Stop reason: ALTCHOICE

## 2025-02-07 RX ORDER — DEXAMETHASONE SODIUM PHOSPHATE 4 MG/ML
INJECTION, SOLUTION INTRA-ARTICULAR; INTRALESIONAL; INTRAMUSCULAR; INTRAVENOUS; SOFT TISSUE
Status: DISCONTINUED | OUTPATIENT
Start: 2025-02-07 | End: 2025-02-07 | Stop reason: SDUPTHER

## 2025-02-07 RX ORDER — ACETAMINOPHEN 325 MG/1
650 TABLET ORAL EVERY 6 HOURS PRN
Status: DISCONTINUED | OUTPATIENT
Start: 2025-02-07 | End: 2025-02-10

## 2025-02-07 RX ORDER — ENOXAPARIN SODIUM 100 MG/ML
40 INJECTION SUBCUTANEOUS DAILY
Status: DISCONTINUED | OUTPATIENT
Start: 2025-02-07 | End: 2025-02-14 | Stop reason: HOSPADM

## 2025-02-07 RX ORDER — SODIUM CHLORIDE 9 MG/ML
INJECTION, SOLUTION INTRAVENOUS PRN
Status: DISCONTINUED | OUTPATIENT
Start: 2025-02-07 | End: 2025-02-14 | Stop reason: HOSPADM

## 2025-02-07 RX ORDER — LABETALOL HYDROCHLORIDE 5 MG/ML
10 INJECTION, SOLUTION INTRAVENOUS EVERY 30 MIN PRN
Status: DISCONTINUED | OUTPATIENT
Start: 2025-02-07 | End: 2025-02-14 | Stop reason: HOSPADM

## 2025-02-07 RX ORDER — PROCHLORPERAZINE EDISYLATE 5 MG/ML
5 INJECTION INTRAMUSCULAR; INTRAVENOUS
Status: DISCONTINUED | OUTPATIENT
Start: 2025-02-07 | End: 2025-02-07 | Stop reason: HOSPADM

## 2025-02-07 RX ORDER — FENTANYL CITRATE 50 UG/ML
INJECTION, SOLUTION INTRAMUSCULAR; INTRAVENOUS
Status: DISCONTINUED | OUTPATIENT
Start: 2025-02-07 | End: 2025-02-07 | Stop reason: SDUPTHER

## 2025-02-07 RX ORDER — ACETAMINOPHEN 650 MG/1
650 SUPPOSITORY RECTAL EVERY 6 HOURS PRN
Status: DISCONTINUED | OUTPATIENT
Start: 2025-02-07 | End: 2025-02-08

## 2025-02-07 RX ORDER — ONDANSETRON 4 MG/1
4 TABLET, ORALLY DISINTEGRATING ORAL EVERY 8 HOURS PRN
Status: DISCONTINUED | OUTPATIENT
Start: 2025-02-07 | End: 2025-02-07

## 2025-02-07 RX ORDER — SODIUM CHLORIDE 9 MG/ML
INJECTION, SOLUTION INTRAVENOUS PRN
Status: DISCONTINUED | OUTPATIENT
Start: 2025-02-07 | End: 2025-02-07 | Stop reason: HOSPADM

## 2025-02-07 RX ORDER — ONDANSETRON 2 MG/ML
INJECTION INTRAMUSCULAR; INTRAVENOUS
Status: DISCONTINUED | OUTPATIENT
Start: 2025-02-07 | End: 2025-02-07 | Stop reason: SDUPTHER

## 2025-02-07 RX ORDER — POTASSIUM CHLORIDE 7.45 MG/ML
10 INJECTION INTRAVENOUS PRN
Status: DISCONTINUED | OUTPATIENT
Start: 2025-02-07 | End: 2025-02-07

## 2025-02-07 RX ORDER — NALOXONE HYDROCHLORIDE 0.4 MG/ML
INJECTION, SOLUTION INTRAMUSCULAR; INTRAVENOUS; SUBCUTANEOUS PRN
Status: DISCONTINUED | OUTPATIENT
Start: 2025-02-07 | End: 2025-02-07 | Stop reason: HOSPADM

## 2025-02-07 RX ORDER — ONDANSETRON 4 MG/1
4 TABLET, ORALLY DISINTEGRATING ORAL EVERY 8 HOURS PRN
Status: DISCONTINUED | OUTPATIENT
Start: 2025-02-07 | End: 2025-02-14 | Stop reason: HOSPADM

## 2025-02-07 RX ORDER — ACETAMINOPHEN 325 MG/1
650 TABLET ORAL EVERY 6 HOURS PRN
Status: DISCONTINUED | OUTPATIENT
Start: 2025-02-07 | End: 2025-02-07

## 2025-02-07 RX ORDER — ROCURONIUM BROMIDE 10 MG/ML
INJECTION, SOLUTION INTRAVENOUS
Status: DISCONTINUED | OUTPATIENT
Start: 2025-02-07 | End: 2025-02-07 | Stop reason: SDUPTHER

## 2025-02-07 RX ORDER — ZOLPIDEM TARTRATE 5 MG/1
5 TABLET ORAL NIGHTLY PRN
Status: DISCONTINUED | OUTPATIENT
Start: 2025-02-07 | End: 2025-02-14 | Stop reason: HOSPADM

## 2025-02-07 RX ORDER — HYDROCODONE BITARTRATE AND ACETAMINOPHEN 5; 325 MG/1; MG/1
1 TABLET ORAL EVERY 6 HOURS PRN
Status: DISCONTINUED | OUTPATIENT
Start: 2025-02-07 | End: 2025-02-10

## 2025-02-07 RX ORDER — NALOXONE HYDROCHLORIDE 0.4 MG/ML
INJECTION, SOLUTION INTRAMUSCULAR; INTRAVENOUS; SUBCUTANEOUS PRN
Status: DISCONTINUED | OUTPATIENT
Start: 2025-02-07 | End: 2025-02-10

## 2025-02-07 RX ORDER — INSULIN LISPRO 100 [IU]/ML
0-4 INJECTION, SOLUTION INTRAVENOUS; SUBCUTANEOUS
Status: DISCONTINUED | OUTPATIENT
Start: 2025-02-07 | End: 2025-02-14 | Stop reason: HOSPADM

## 2025-02-07 RX ORDER — IPRATROPIUM BROMIDE AND ALBUTEROL SULFATE 2.5; .5 MG/3ML; MG/3ML
1 SOLUTION RESPIRATORY (INHALATION)
Status: DISCONTINUED | OUTPATIENT
Start: 2025-02-07 | End: 2025-02-07 | Stop reason: HOSPADM

## 2025-02-07 RX ORDER — GLUCAGON 1 MG/ML
1 KIT INJECTION PRN
Status: DISCONTINUED | OUTPATIENT
Start: 2025-02-07 | End: 2025-02-14 | Stop reason: HOSPADM

## 2025-02-07 RX ORDER — ENOXAPARIN SODIUM 100 MG/ML
40 INJECTION SUBCUTANEOUS DAILY
Status: DISCONTINUED | OUTPATIENT
Start: 2025-02-07 | End: 2025-02-07

## 2025-02-07 RX ORDER — SCOPOLAMINE 1 MG/3D
1 PATCH, EXTENDED RELEASE TRANSDERMAL
Status: DISCONTINUED | OUTPATIENT
Start: 2025-02-07 | End: 2025-02-14 | Stop reason: HOSPADM

## 2025-02-07 RX ORDER — DEXTROSE MONOHYDRATE 100 MG/ML
INJECTION, SOLUTION INTRAVENOUS CONTINUOUS PRN
Status: DISCONTINUED | OUTPATIENT
Start: 2025-02-07 | End: 2025-02-14 | Stop reason: HOSPADM

## 2025-02-07 RX ORDER — SODIUM CHLORIDE 9 MG/ML
INJECTION, SOLUTION INTRAVENOUS
Status: DISCONTINUED | OUTPATIENT
Start: 2025-02-07 | End: 2025-02-07 | Stop reason: SDUPTHER

## 2025-02-07 RX ORDER — ONDANSETRON 2 MG/ML
4 INJECTION INTRAMUSCULAR; INTRAVENOUS EVERY 6 HOURS PRN
Status: DISCONTINUED | OUTPATIENT
Start: 2025-02-07 | End: 2025-02-14 | Stop reason: HOSPADM

## 2025-02-07 RX ORDER — ONDANSETRON 2 MG/ML
4 INJECTION INTRAMUSCULAR; INTRAVENOUS
Status: DISCONTINUED | OUTPATIENT
Start: 2025-02-07 | End: 2025-02-07 | Stop reason: HOSPADM

## 2025-02-07 RX ADMIN — METRONIDAZOLE 500 MG: 500 INJECTION, SOLUTION INTRAVENOUS at 12:55

## 2025-02-07 RX ADMIN — SODIUM CHLORIDE, POTASSIUM CHLORIDE, SODIUM LACTATE AND CALCIUM CHLORIDE: 600; 310; 30; 20 INJECTION, SOLUTION INTRAVENOUS at 18:06

## 2025-02-07 RX ADMIN — FENTANYL CITRATE 50 MCG: 50 INJECTION, SOLUTION INTRAMUSCULAR; INTRAVENOUS at 12:19

## 2025-02-07 RX ADMIN — ROCURONIUM BROMIDE 50 MG: 10 INJECTION, SOLUTION INTRAVENOUS at 12:21

## 2025-02-07 RX ADMIN — Medication: at 16:22

## 2025-02-07 RX ADMIN — MIDAZOLAM 1 MG: 1 INJECTION INTRAMUSCULAR; INTRAVENOUS at 12:06

## 2025-02-07 RX ADMIN — KETAMINE HYDROCHLORIDE 30 MG: 10 INJECTION INTRAMUSCULAR; INTRAVENOUS at 13:57

## 2025-02-07 RX ADMIN — ENOXAPARIN SODIUM 40 MG: 100 INJECTION SUBCUTANEOUS at 18:08

## 2025-02-07 RX ADMIN — LIDOCAINE HYDROCHLORIDE 80 MG: 20 INJECTION, SOLUTION EPIDURAL; INFILTRATION; INTRACAUDAL; PERINEURAL at 12:19

## 2025-02-07 RX ADMIN — WATER 2000 MG: 1 INJECTION INTRAMUSCULAR; INTRAVENOUS; SUBCUTANEOUS at 12:10

## 2025-02-07 RX ADMIN — SUGAMMADEX 200 MG: 100 INJECTION, SOLUTION INTRAVENOUS at 15:20

## 2025-02-07 RX ADMIN — SODIUM CHLORIDE: 9 INJECTION, SOLUTION INTRAVENOUS at 13:53

## 2025-02-07 RX ADMIN — KETAMINE HYDROCHLORIDE 10 MG: 10 INJECTION INTRAMUSCULAR; INTRAVENOUS at 14:57

## 2025-02-07 RX ADMIN — PROPOFOL 140 MG: 10 INJECTION, EMULSION INTRAVENOUS at 12:20

## 2025-02-07 RX ADMIN — DEXAMETHASONE SODIUM PHOSPHATE 8 MG: 4 INJECTION, SOLUTION INTRAMUSCULAR; INTRAVENOUS at 12:25

## 2025-02-07 RX ADMIN — NALOXEGOL OXALATE 12.5 MG: 12.5 TABLET, FILM COATED ORAL at 18:52

## 2025-02-07 RX ADMIN — SODIUM CHLORIDE: 9 INJECTION, SOLUTION INTRAVENOUS at 12:05

## 2025-02-07 RX ADMIN — ONDANSETRON 4 MG: 2 INJECTION, SOLUTION INTRAMUSCULAR; INTRAVENOUS at 14:43

## 2025-02-07 ASSESSMENT — PAIN DESCRIPTION - DESCRIPTORS
DESCRIPTORS: DISCOMFORT
DESCRIPTORS: DISCOMFORT

## 2025-02-07 ASSESSMENT — PAIN - FUNCTIONAL ASSESSMENT
PAIN_FUNCTIONAL_ASSESSMENT: ACTIVITIES ARE NOT PREVENTED
PAIN_FUNCTIONAL_ASSESSMENT: ACTIVITIES ARE NOT PREVENTED
PAIN_FUNCTIONAL_ASSESSMENT: 0-10
PAIN_FUNCTIONAL_ASSESSMENT: ACTIVITIES ARE NOT PREVENTED

## 2025-02-07 ASSESSMENT — LIFESTYLE VARIABLES: SMOKING_STATUS: 0

## 2025-02-07 ASSESSMENT — PAIN SCALES - GENERAL
PAINLEVEL_OUTOF10: 10
PAINLEVEL_OUTOF10: 0
PAINLEVEL_OUTOF10: 7

## 2025-02-07 ASSESSMENT — PAIN DESCRIPTION - LOCATION: LOCATION: ABDOMEN

## 2025-02-07 NOTE — ANESTHESIA POSTPROCEDURE EVALUATION
Department of Anesthesiology  Postprocedure Note    Patient: Sallie Gao  MRN: [id number]  YOB: 1955  Date of evaluation: 2/7/2025    Procedure Summary       Date: 02/07/25 Room / Location: [address]    Anesthesia Start: 1205 Anesthesia Stop: 1545    Procedures:       LAPAROSCIOPIC, HAND ASSIST, PARTIAL COLECTOMY WITH PRIMARY ANASTAMOSIS AND BLADDER REPAIR WITH TAP BLOCK (Abdomen)      LAPAROSCOPIC CYSTOTOMY REPAIR, CYSTOSCOPY, URETERAL CATHETER INSERTION AND REMOVAL, HICKS INSERTION (Bilateral: Urethra) Diagnosis:       Diverticulitis of colon with perforation      (Diverticulitis of colon with perforation [K57.20])    Surgeons: Virgilio King MD; Raulito Lima MD Responsible Provider: Chon Santos DO    Anesthesia Type: general ASA Status: 3            Anesthesia Type: No value filed.    Jovan Phase I: Jovan Score: 10    Jovan Phase II:      Anesthesia Post Evaluation    Patient location during evaluation: PACU  Patient participation: complete - patient participated  Level of consciousness: awake  Cardiovascular status: blood pressure returned to baseline  Respiratory status: acceptable  Hydration status: euvolemic  Multimodal analgesia pain management approach  Pain management: adequate        No notable events documented.

## 2025-02-07 NOTE — H&P
GENERAL SURGERY  HISTORY AND PHYSICAL  2/7/2025    [facility]  Sallie Gao is a 69 y.o. female who was admitted in November of 2024 for diverticulitis and persistent leukocytosis. She did well on IV antibiotics and diet was able to be advance. Patient discharged on PO antibiotics at that time and follow up in office. After further discussion and severity of diverticulitis, decision was made to undergo sigmoid colectomy.     No new changes since last office visit.       Past Medical History:   Diagnosis Date    Arthritis     Diverticulitis     Embolism ([facility]) 2003    pulmonary post op    Hx of blood clots     SHANTANU (obstructive sleep apnea)     Unable to tolerate CPAP    Right foot pain     Type 2 diabetes mellitus without complication ([facility]) 04/27/2016       Past Surgical History:   Procedure Laterality Date    ANESTHESIA NERVE BLOCK Left 03/07/2019    LEFT L4-5 TRANSFORAMINAL EPIDURAL STEROID INJECTION performed by Zohreh Cazares DO at [facility] OR    ANESTHESIA NERVE BLOCK Left 03/28/2019    LEFT L4-5 TRANSFORAMINAL EPIDURAL STEROID INJECTION performed by Zohreh Cazares DO at [facility] OR    BACK SURGERY  2003    Lumbar, laminectomy, Dr. Ross    CATARACT REMOVAL      EPIDURAL STEROID INJECTION Left 09/05/2019    LEFT L4-5 TRANSFORAMINAL EPIDURAL STEROID INJECTION performed by Zohreh Cazares DO at [facility] OR    EYE SURGERY      HYSTERECTOMY (CERVIX STATUS UNKNOWN)  2002    Adhesions, menorrhagia    INGUINAL HERNIA REPAIR  1961, 1962    MEDICATION INJECTION Right 11/18/2024    CORTISONE INJECTION UNDER FLUOROSCOPY TARSOMETATARSAL JOINT 2 AND JOINT 3 RIGHT FOOT performed by Sai Hudson DPM at [facility] OR    NERVE BLOCK Left 03/07/2019    transforaminal epidural    NERVE BLOCK Left 03/28/2019    NERVE BLOCK Left 09/05/2019    NOSE SURGERY  2008    Nasal ablation    TONSILLECTOMY  1975    VITRECTOMY Right 4/4/2024    PARS PLANA VITRECTOMY 25 GAUGE MEMBRANE PEEL RIGHT EYE performed

## 2025-02-07 NOTE — OP NOTE
Operative Note      Patient: Sallie Gao  YOB: 1955  MRN: [id number]    Date of Procedure: 2/7/2025    Pre-Op Diagnosis Codes:      * Diverticulitis of colon with perforation [K57.20], Cystotomy    Post-Op Diagnosis: Same       Procedure(s):  LAPAROSCOPIC COLECTOMY  LAPAROSCOPIC CYSTOTOMY REPAIR, CYSTOSCOPY, BILATERAL URETERAL CATHETER INSERTION / REMOVAL    Surgeon(s):  Virgilio King MD Daniel Ricchiuti, MD    Assistant:   Resident: Sonia Chin DO    Anesthesia: General    Estimated Blood Loss (mL): Minimal    Complications: None    Specimens:   * No specimens in log *    Implants:  * No implants in log *      Drains:   [REMOVED] Urinary Catheter 02/07/25 Kearney (Removed)     INDICATION FOR PROCEDURE:  Sallie is a 69-year-old female with diverticulitis who was undergoing a sigmoid colectomy with Dr. Virgilio King.  I was consulted intraoperatively to assess a potential bladder injury during the time of surgery.  Upon laparoscopic evaluation there appeared to be a small, 1 cm cystotomy on the left lateral, anterior surface of the bladder wall.    OPERATIVE PROCEDURE:  The cystotomy was clearly delineated.  Laparoscopic technique was used to close the cystotomy using a 2-0 V-Loc suture, absorbable.  A running closure was performed.  The bladder was instilled with 180 cc of saline and showed no evidence of leakage through the closure.    Cystoscopy was then performed in the trigone was completely normal and without any evidence of trauma.  The sutured cystotomy appeared to be closed and far away from the trigone.  Ureteral catheter was advanced into the left ureteral orifice until resistance was met in the renal pelvis on the left side and this was repeated on the right side proving that the ureters appeared to be patent and without obstruction.    A 22 Polish Kearney catheter was inserted.  Dr. King performed the remainder of the procedure including closure.    PLAN:  Our office will

## 2025-02-07 NOTE — PROGRESS NOTES
Pt did not take entereg this morning. Pt already spoke to Dr. King regarding [facility] not having the medication in stock and he will order something post op.

## 2025-02-07 NOTE — ANESTHESIA PRE PROCEDURE
Department of Anesthesiology  Preprocedure Note       Name:  Sallie Gao   Age:  69 y.o.  :  1955                                          MRN:  [id number]         Date:  2025      Surgeon: Surgeon(s):  Virgilio King MD    Procedure: Procedure(s):  LAPAROSCOPIC COLECTOMY    Medications prior to admission:   Prior to Admission medications    Medication Sig Start Date End Date Taking? Authorizing Provider   Bempedoic Acid-Ezetimibe (NEXLIZET) 180-10 MG TABS Take by mouth   Yes Brayden Christy MD   Empagliflozin-linaGLIPtin (GLYXAMBI) 25-5 MG TABS Take 1 tablet by mouth Daily   Yes Brayden Christy MD   Azelastine HCl 137 MCG/SPRAY SOLN INSTILL 1-2 SPRAYS INTO EACH NOSTRIL TWICE DAILY AS NEEDED FOR RHINITIS 24  Yes Justo Salguero APRN - CNP   zolpidem (AMBIEN CR) 12.5 MG extended release tablet Take 1 tablet by mouth nightly as needed for Sleep.   Yes Brayden Christy MD   sodium chloride (ALTAMIST SPRAY) 0.65 % nasal spray 2 sprays by Nasal route 4 times daily  Patient taking differently: 2 sprays by Nasal route as needed 23   Justo Salguero APRN - CNP   aspirin 81 MG chewable tablet Take 1 tablet by mouth daily 2/15/20   Raulito Fitch MD   gabapentin (NEURONTIN) 600 MG tablet Take 1 tablet by mouth nightly for 30 days. 19  Zohreh Cazares DO       Current medications:    Current Facility-Administered Medications   Medication Dose Route Frequency Provider Last Rate Last Admin    ceFAZolin (ANCEF) 2,000 mg in sterile water 20 mL IV syringe  2,000 mg IntraVENous Once Virgilio King MD           Allergies:    Allergies   Allergen Reactions    Metformin Diarrhea    Statins Other (See Comments)     Unable to tolerate GI symptoms  Lipitor and Crestor    Tirzepatide Diarrhea       Problem List:    Patient Active Problem List   Diagnosis Code    Type 2 diabetes mellitus without complication (HCC) E11.9    Primary insomnia F51.01

## 2025-02-07 NOTE — PROGRESS NOTES
4 Eyes Skin Assessment     NAME:  Sallie Gao  YOB: 1955  MEDICAL RECORD NUMBER:  [id number]    The patient is being assessed for  Admission    I agree that at least one RN has performed a thorough Head to Toe Skin Assessment on the patient. ALL assessment sites listed below have been assessed.      Areas assessed by both nurses:    Head, Face, Ears, Shoulders, Back, Chest, Arms, Elbows, Hands, Sacrum. Buttock, Coccyx, Ischium, Legs. Feet and Heels, and Under Medical Devices         Does the Patient have a Wound? No noted wound(s)       Mitesh Prevention initiated by RN: No  Wound Care Orders initiated by RN: No    Pressure Injury (Stage 3,4, Unstageable, DTI, NWPT, and Complex wounds) if present, place Wound referral order by RN under : No    New Ostomies, if present place, Ostomy referral order under : No     Nurse 1 eSignature: Electronically signed by Renetta Haynes RN on 2/7/25 at 5:43 PM EST    **SHARE this note so that the co-signing nurse can place an eSignature**    Nurse 2 eSignature: Electronically signed by Lona Joiner RN on 2/7/25 at 5:46 PM EST

## 2025-02-07 NOTE — OP NOTE
Operative Note      Patient: Sallie Gao  YOB: 1955  MRN: [id number]    Date of Procedure: 2/7/2025    Pre-Op Diagnosis Codes:      * Diverticulitis of colon with perforation [K57.20]    Post-Op Diagnosis: Same       Procedure(s):  LAPAROSCIOPIC, HAND ASSIST, PARTIAL COLECTOMY WITH PRIMARY ANASTAMOSIS AND BLADDER REPAIR WITH TAP BLOCK  LAPAROSCOPIC CYSTOTOMY REPAIR, CYSTOSCOPY, URETERAL CATHETER INSERTION AND REMOVAL, HICKS INSERTION    Surgeon(s):  Virgilio King MD Ricchiuti, Daniel J, MD    Assistant:   Resident: Sonia Chin DO    Anesthesia: General    Estimated Blood Loss (mL): less than 100     Complications: Unplanned perforation of  bladder    Specimens:   ID Type Source Tests Collected by Time Destination   A : SIGMOID COLON Specimen Abdomen SURGICAL PATHOLOGY Virgilio King MD 2/7/2025 2757    B : donuts Tissue Tissue SURGICAL PATHOLOGY Virgilio King MD 2/7/2025 1230        Implants:  * No implants in log *      Drains:   Urinary Catheter 02/07/25 Hicks (Active)       [REMOVED] Urinary Catheter 02/07/25 Hicks (Removed)       Findings:  Infection Present At Time Of Surgery (PATOS) (choose all levels that have infection present):  No infection present  Other Findings: dense adhesion of colon to anterior surface of bladder  This procedure was not performed to treat colon cancer through resection    Detailed Description of Procedure:     Patient was originally seen in the hospital for perforated diverticulitis with abscess formation.  Patient was conservatively managed, recovered well and was discharged.  Patient was seen in the outpatient setting and discussed need, risks and benefits, and alternatives to proceeding with colectomy given the recurrent symptoms patient has been having and the complicated diverticulitis she presented with.  Patient prior to this underwent colonoscopy which demonstrated 2 benign polyps which were both removed.  This was sure that there  proximal.  I continued this dissection until I encountered a significant pedicle correlating directly with the disease portion of the colon.  With good mobilization I determined that I could extracorporealized this portion of the colon to facilitate further dissection of the mesentery to assure good blood flow to the proximal aspect of my anastomosis bowel.  Once the disease segment was fully mobilized from its mesentery and a high ligation fashion close to the bowel, I then transected the proximal aspect of the colon free of diverticular disease.  This was done by first clamping proximal with a mechanical pursestring device which I utilized Prolene 0 suture on a Arnaldo needle.  I then sharply transected the colon distal to this.  I then removed the specimen and sent for permanent pathology.  I then inspected the mucosa of the proximal transection point and noted to be bleeding healthy and viable without ischemic changes.  I then dilated this aspect of the bowel and placed a 25 mm anvil attachment of a EEA circular stapler device.  I then sutured down the pursestring suture.  I then reintroduced the segment to the bowel back into the abdomen and reinflated the abdomen.  I then inspected the colon to have good lengthening to facilitate reaching down to the distal aspect of the transected colon.  I then turned my attention to the lower aspect of performing the anastomosis, dilating the rectum and sigmoid with metal dilators and locating the dilators on the distal aspect of the colon intra-abdominal he with palpation.  I then selected an adequate location on the anterior surface of the distal colon to facilitate the anastomosis.  I then introduced the EEA circular stapler transrectally and under direct visualization laparoscopically deployed the spike.  I was able to connect the anvil to the spike and clamp the  stapler down without tension on either aspect of the colon.  I then fired the EEA circular stapler and

## 2025-02-08 LAB
ANION GAP SERPL CALCULATED.3IONS-SCNC: 9 MMOL/L (ref 7–16)
BASOPHILS # BLD: 0.01 K/UL (ref 0–0.2)
BASOPHILS NFR BLD: 0 % (ref 0–2)
BUN SERPL-MCNC: 21 MG/DL (ref 6–23)
CALCIUM SERPL-MCNC: 8.8 MG/DL (ref 8.6–10.2)
CHLORIDE SERPL-SCNC: 108 MMOL/L (ref 98–107)
CO2 SERPL-SCNC: 25 MMOL/L (ref 22–29)
CREAT SERPL-MCNC: 0.7 MG/DL (ref 0.5–1)
EOSINOPHIL # BLD: 0 K/UL (ref 0.05–0.5)
EOSINOPHILS RELATIVE PERCENT: 0 % (ref 0–6)
ERYTHROCYTE [DISTWIDTH] IN BLOOD BY AUTOMATED COUNT: 14.3 % (ref 11.5–15)
GFR, ESTIMATED: >90 ML/MIN/1.73M2
GLUCOSE BLD-MCNC: 102 MG/DL (ref 74–99)
GLUCOSE BLD-MCNC: 106 MG/DL (ref 74–99)
GLUCOSE BLD-MCNC: 119 MG/DL (ref 74–99)
GLUCOSE BLD-MCNC: 87 MG/DL (ref 74–99)
GLUCOSE SERPL-MCNC: 158 MG/DL (ref 74–99)
HCT VFR BLD AUTO: 38.8 % (ref 34–48)
HGB BLD-MCNC: 12.1 G/DL (ref 11.5–15.5)
IMM GRANULOCYTES # BLD AUTO: 0.05 K/UL (ref 0–0.58)
IMM GRANULOCYTES NFR BLD: 1 % (ref 0–5)
LYMPHOCYTES NFR BLD: 0.92 K/UL (ref 1.5–4)
LYMPHOCYTES RELATIVE PERCENT: 9 % (ref 20–42)
MCH RBC QN AUTO: 27.4 PG (ref 26–35)
MCHC RBC AUTO-ENTMCNC: 31.2 G/DL (ref 32–34.5)
MCV RBC AUTO: 87.8 FL (ref 80–99.9)
MONOCYTES NFR BLD: 0.83 K/UL (ref 0.1–0.95)
MONOCYTES NFR BLD: 8 % (ref 2–12)
NEUTROPHILS NFR BLD: 83 % (ref 43–80)
NEUTS SEG NFR BLD: 8.83 K/UL (ref 1.8–7.3)
PLATELET # BLD AUTO: 226 K/UL (ref 130–450)
PMV BLD AUTO: 11.4 FL (ref 7–12)
POTASSIUM SERPL-SCNC: 4.7 MMOL/L (ref 3.5–5)
RBC # BLD AUTO: 4.42 M/UL (ref 3.5–5.5)
SODIUM SERPL-SCNC: 142 MMOL/L (ref 132–146)
WBC OTHER # BLD: 10.6 K/UL (ref 4.5–11.5)

## 2025-02-08 PROCEDURE — 80048 BASIC METABOLIC PNL TOTAL CA: CPT

## 2025-02-08 PROCEDURE — 97161 PT EVAL LOW COMPLEX 20 MIN: CPT

## 2025-02-08 PROCEDURE — 6360000002 HC RX W HCPCS: Performed by: STUDENT IN AN ORGANIZED HEALTH CARE EDUCATION/TRAINING PROGRAM

## 2025-02-08 PROCEDURE — 82962 GLUCOSE BLOOD TEST: CPT

## 2025-02-08 PROCEDURE — 85025 COMPLETE CBC W/AUTO DIFF WBC: CPT

## 2025-02-08 PROCEDURE — 1200000000 HC SEMI PRIVATE

## 2025-02-08 PROCEDURE — 97165 OT EVAL LOW COMPLEX 30 MIN: CPT

## 2025-02-08 PROCEDURE — 2700000000 HC OXYGEN THERAPY PER DAY

## 2025-02-08 PROCEDURE — 6360000002 HC RX W HCPCS

## 2025-02-08 PROCEDURE — 2500000003 HC RX 250 WO HCPCS: Performed by: STUDENT IN AN ORGANIZED HEALTH CARE EDUCATION/TRAINING PROGRAM

## 2025-02-08 PROCEDURE — 51700 IRRIGATION OF BLADDER: CPT

## 2025-02-08 RX ORDER — METRONIDAZOLE 500 MG/100ML
500 INJECTION, SOLUTION INTRAVENOUS EVERY 12 HOURS
Status: COMPLETED | OUTPATIENT
Start: 2025-02-08 | End: 2025-02-09

## 2025-02-08 RX ADMIN — SODIUM CHLORIDE, PRESERVATIVE FREE 10 ML: 5 INJECTION INTRAVENOUS at 20:01

## 2025-02-08 RX ADMIN — METRONIDAZOLE 500 MG: 500 INJECTION, SOLUTION INTRAVENOUS at 12:02

## 2025-02-08 RX ADMIN — METRONIDAZOLE 500 MG: 500 INJECTION, SOLUTION INTRAVENOUS at 23:48

## 2025-02-08 RX ADMIN — WATER 2000 MG: 1 INJECTION INTRAMUSCULAR; INTRAVENOUS; SUBCUTANEOUS at 11:52

## 2025-02-08 RX ADMIN — ENOXAPARIN SODIUM 40 MG: 100 INJECTION SUBCUTANEOUS at 11:36

## 2025-02-08 RX ADMIN — WATER 2000 MG: 1 INJECTION INTRAMUSCULAR; INTRAVENOUS; SUBCUTANEOUS at 20:01

## 2025-02-08 NOTE — PROGRESS NOTES
Dr. Lima paged through answering service, discussed reyes catheter, no new orders at this time.  Georgie Conn RN

## 2025-02-08 NOTE — PROGRESS NOTES
Occupational Therapy  OCCUPATIONAL THERAPY INITIAL EVALUATION  [facility]  [address], [place]    Date: 2025     Patient Name: Sallie Gao  MRN: [id number]  : 1955  Room: [address] [place] [address]    Evaluating OT: Georgie Hogan, OTR/L - [id number]    Referring Provider: Sonia Chin DO   Specific Provider Orders/Date: \"OT eval and treat\" - 2025    Diagnosis: Diverticulitis of colon with perforation [K57.20]  Perforated diverticulum of large intestine [K57.20]    Surgery: Patient underwent laparoscopic partial colectomy with bladder repair on 2025.  Pertinent Medical History: arthritis, SHANTANU, DM, back surgery     Precautions: fall risk    Assessment of Current Deficits:    [x] Functional mobility   [x]ADLs  [x] Strength               []Cognition   [x] Functional transfers   [x] IADLs         [x] Safety Awareness   [x]Endurance   [] Fine Coordination              [x] Balance      [] Vision/perception   []Sensation    []Gross Motor Coordination  [] ROM  [] Delirium                   [] Motor Control     OT PLAN OF CARE   OT POC is based on physician orders, patient diagnosis, and results of clinical assessment.  Frequency/Duration 2-5 days/week for 2 weeks PRN   Specific OT Treatment Interventions to Include:   * Instruction/training on adapted ADL techniques and AE recommendations to increase functional independence within precautions       * Training on energy conservation strategies, correct breathing pattern and techniques to improve independence/tolerance for self-care routine  * Functional transfer/mobility training/DME recommendations for increased independence, safety, and fall prevention  * Patient/Family education to increase follow through with safety techniques and functional independence  * Recommendation of environmental modifications for increased safety with functional transfers/mobility and ADLs  * Therapeutic exercise to  verbalized understanding.    Further skilled OT treatment indicated to increase patient's safety and independence with completion of ADL/IADL tasks in order to maximize patient's functional independence and quality of life.    Rehab Potential: Good for established goals.  Patient / Family Goal: patient did not state a specific goal  Patient and/or family were instructed on functional diagnosis, prognosis/goals, and OT plan of care. Verbalized understanding.    Eval Complexity: low     Time In: 919  Time Out: 932  Total Treatment Time: 0 minutes      Minutes Units   OT Eval Low 73491 13 1   OT Eval Medium 84974     OT Eval High 91493     OT Re-Eval 76765     Therapeutic Ex 71963     Therapeutic Activities 79241     ADL/Self Care 51905     Orthotic Management 47313     Neuro Re-Ed 05709     Non-Billable Time N/A ---     Evaluation time includes thorough review of current medical information, gathering information on past medical history/social history and prior level of function, completion of standardized testing/informal observation of tasks, assessment of data, and education on plan of care and goals.    TAY Chan/L  License Number: [id number]

## 2025-02-08 NOTE — PROGRESS NOTES
[facility]  [address] [id number]  [phone]  FAX [phone]      CHIEF UROLOGIC COMPLAINT: Bladder injury during colon surgery    HISTORY OF PRESENT ILLNESS:  Patient with clots overnight.  Kearney catheter removed and new Kearney catheter inserted.  CBI started and catheter irrigated by nurses.    REVIEW OF SYSTEMS:   Respiratory: negative for cough and hemoptysis  Cardiovascular: negative for chest pain and dyspnea  Gastrointestinal: negative for abdominal pain, diarrhea, nausea and vomiting  Derm: negative for rash and skin lesion(s)  Neurological: negative for seizures and tremors  Endocrine: negative for diabetic symptoms including polydipsia and polyuria  : As above in the HPI, otherwise negative  All other systems negative    PAST FAMILY HISTORY:    Family History   Problem Relation Age of Onset    Cancer Brother         Lung    Diabetes Father     Heart Disease Paternal Grandfather      PAST SOCIAL HISTORY:    Social History     Socioeconomic History    Marital status:      Spouse name: None    Number of children: None    Years of education: None    Highest education level: None   Tobacco Use    Smoking status: Never    Smokeless tobacco: Never   Vaping Use    Vaping status: Never Used   Substance and Sexual Activity    Alcohol use: Not Currently    Drug use: No     Social Determinants of Health     Food Insecurity: No Food Insecurity (2/7/2025)    Hunger Vital Sign     Worried About Running Out of Food in the Last Year: Never true     Ran Out of Food in the Last Year: Never true   Transportation Needs: No Transportation Needs (2/7/2025)    PRAPARE - Transportation     Lack of Transportation (Medical): No     Lack of Transportation (Non-Medical): No   Housing Stability: Low Risk  (2/7/2025)    Housing Stability Vital Sign     Unable to Pay for Housing in the Last Year: No     Number of Times Moved in the Last Year: 0     Homeless in the Last  Year: No       Scheduled Meds:   sodium chloride flush  5-40 mL IntraVENous 2 times per day    sodium chloride flush  5-40 mL IntraVENous 2 times per day    enoxaparin  40 mg SubCUTAneous Daily    insulin lispro  0-4 Units SubCUTAneous 4x Daily AC & HS    scopolamine  1 patch TransDERmal Q72H    naloxegol  12.5 mg Oral Daily     Continuous Infusions:   sodium chloride      lactated ringers 75 mL/hr at 02/07/25 1806    HYDROmorphone      dextrose       PRN Meds:.sodium chloride flush, HYDROmorphone, HYDROmorphone, zolpidem, sodium chloride flush, sodium chloride, potassium chloride **OR** potassium alternative oral replacement **OR** potassium chloride, magnesium sulfate, ondansetron **OR** ondansetron, polyethylene glycol, acetaminophen **OR** acetaminophen, naloxone 0.4 mg in 10 mL sodium chloride syringe, HYDROcodone 5 mg - acetaminophen, hydrALAZINE, labetalol, glucose, dextrose bolus **OR** dextrose bolus, glucagon (rDNA), dextrose    /65   Pulse 62   Temp 98.4 °F (36.9 °C) (Tympanic)   Resp 16   Ht 1.651 m (5' 5\")   Wt 69.4 kg (153 lb)   SpO2 100%   BMI 25.46 kg/m²     Lab Results   Component Value Date    WBC 10.6 02/08/2025    HGB 12.1 02/08/2025    HCT 38.8 02/08/2025    MCV 87.8 02/08/2025     02/08/2025       Lab Results   Component Value Date    CREATININE 0.7 02/08/2025       Lab Results   Component Value Date    LABURIN <10,000 CFU/mL  Gram positive organism   02/13/2020    LABURIN 200 02/06/2019    LABURIN 100 07/30/2018       Lab Results   Component Value Date    BC 5 Days- no growth 02/13/2020       Lab Results   Component Value Date    BLOODCULT2 5 Days- no growth 02/13/2020       PHYSICAL EXAMINATION:  Skin dry, without rashes  Respirations non-labored, intact  Alert and oriented x3  Kearney draining bloody urine      ASSESSMENT AND PLAN:  1.  Cystotomy.  Closure laparoscopically postop day #1.  Kearney changed last night to three-way hematuria catheter.  Continue CBI rigorously and

## 2025-02-08 NOTE — PROGRESS NOTES
Reyes w/ debra red blood in bag clot in tubing, pt w/ lower abd pressure bladder scan for 200cc. Notified urology, new order to place 24f 3-way catheter, irrigate middle port with saline and place reyes to cbi

## 2025-02-08 NOTE — PROGRESS NOTES
Department of General Surgery - Adult  Surgical Service   Attending Progress Note      SUBJECTIVE: Patient seen evaluated this morning resting comfortably in chair in no acute distress, notes pain adequately controlled, denies nausea or vomiting, denies bowel function yet.    Overnight issues with clotting of Kearney catheter, exchanged for three-way with continuous bladder irrigation, appreciate urology assistance and recommendations tremendously    OBJECTIVE      Physical    VITALS:  /65   Pulse 62   Temp 98.4 °F (36.9 °C) (Tympanic)   Resp 16   Ht 1.651 m (5' 5\")   Wt 69.4 kg (153 lb)   SpO2 100%   BMI 25.46 kg/m²   INTAKE/OUTPUT:    Intake/Output Summary (Last 24 hours) at 2/8/2025 0907  Last data filed at 2/8/2025 0601  Gross per 24 hour   Intake 2000 ml   Output 2025 ml   Net -25 ml     URINARY CATHETER OUTPUT (Kearney):     General: Alert and oriented no acute distress  HEENT: Normocephalic atraumatic PERRLA 3 mm bilateral  Cardiovascular: Mild tachycardia but otherwise regular rate and rhythm  Respiratory: Bilateral breath sounds decreased breath sounds bilateral lung fields otherwise clear to auscultation  Abdomen: Soft, nondistended, appropriate tenderness to palpation, incisions clean dry and intact with Dermabond in place  : Three-way Kearney catheter in place with blood tinge urine    Data  CBC:   Lab Results   Component Value Date/Time    WBC 10.6 02/08/2025 04:05 AM    RBC 4.42 02/08/2025 04:05 AM    HGB 12.1 02/08/2025 04:05 AM    HCT 38.8 02/08/2025 04:05 AM    MCV 87.8 02/08/2025 04:05 AM    MCH 27.4 02/08/2025 04:05 AM    MCHC 31.2 02/08/2025 04:05 AM    RDW 14.3 02/08/2025 04:05 AM     02/08/2025 04:05 AM    MPV 11.4 02/08/2025 04:05 AM     CBC with Differential:    Lab Results   Component Value Date/Time    WBC 10.6 02/08/2025 04:05 AM    RBC 4.42 02/08/2025 04:05 AM    HGB 12.1 02/08/2025 04:05 AM    HCT 38.8 02/08/2025 04:05 AM     02/08/2025 04:05 AM    MCV 87.8    Component Value Date/Time    BUN 21 02/08/2025 04:05 AM    CREATININE 0.7 02/08/2025 04:05 AM     Hepatic Function Panel:    Lab Results   Component Value Date/Time    ALKPHOS 67 11/10/2024 04:59 AM    ALT 8 11/10/2024 04:59 AM    AST 14 11/10/2024 04:59 AM    BILITOT 0.6 11/10/2024 04:59 AM     Albumin:  No results found for: \"LABALBU\"  Ionized Calcium:  No components found for: \"IONCA\"  Magnesium:  No results found for: \"MG\"  Phosphorus:  No results found for: \"PHOS\"  LDH:  No results found for: \"LDH\"    Current Inpatient Medications    Current Facility-Administered Medications: sodium chloride flush 0.9 % injection 5-40 mL, 5-40 mL, IntraVENous, 2 times per day  sodium chloride flush 0.9 % injection 5-40 mL, 5-40 mL, IntraVENous, PRN  HYDROmorphone (DILAUDID) injection 0.5 mg, 0.5 mg, IntraVENous, Q4H PRN  HYDROmorphone (DILAUDID) injection 1 mg, 1 mg, IntraVENous, Q4H PRN  zolpidem (AMBIEN) tablet 5 mg, 5 mg, Oral, Nightly PRN  sodium chloride flush 0.9 % injection 5-40 mL, 5-40 mL, IntraVENous, 2 times per day  sodium chloride flush 0.9 % injection 5-40 mL, 5-40 mL, IntraVENous, PRN  0.9 % sodium chloride infusion, , IntraVENous, PRN  potassium chloride (KLOR-CON M) extended release tablet 40 mEq, 40 mEq, Oral, PRN **OR** potassium bicarb-citric acid (EFFER-K) effervescent tablet 40 mEq, 40 mEq, Oral, PRN **OR** potassium chloride 10 mEq/100 mL IVPB (Peripheral Line), 10 mEq, IntraVENous, PRN  magnesium sulfate 2000 mg in 50 mL IVPB premix, 2,000 mg, IntraVENous, PRN  enoxaparin (LOVENOX) injection 40 mg, 40 mg, SubCUTAneous, Daily  ondansetron (ZOFRAN-ODT) disintegrating tablet 4 mg, 4 mg, Oral, Q8H PRN **OR** ondansetron (ZOFRAN) injection 4 mg, 4 mg, IntraVENous, Q6H PRN  polyethylene glycol (GLYCOLAX) packet 17 g, 17 g, Oral, Daily PRN  acetaminophen (TYLENOL) tablet 650 mg, 650 mg, Oral, Q6H PRN **OR** acetaminophen (TYLENOL) suppository 650 mg, 650 mg, Rectal, Q6H PRN  lactated ringers infusion, ,

## 2025-02-08 NOTE — PROGRESS NOTES
Physical Therapy  Facility/Department: [address] ORTHO SURGERY  Physical Therapy Initial Assessment    Name: Sallie Gao  : 1955  MRN: [id number]  Date of Service: 2025        Patient Diagnosis(es): The encounter diagnosis was Diverticulitis of colon with perforation.  Past Medical History:  has a past medical history of Arthritis, Diverticulitis, Embolism (HCC), Hx of blood clots, SHANTANU (obstructive sleep apnea), Right foot pain, and Type 2 diabetes mellitus without complication (HCC).  Past Surgical History:  has a past surgical history that includes back surgery (); Hysterectomy (); Inguinal hernia repair (, ); Tonsillectomy (); Nose surgery (); eye surgery; Cataract removal; Nerve Block (Left, 2019); Anesthesia Nerve Block (Left, 2019); Nerve Block (Left, 2019); Anesthesia Nerve Block (Left, 2019); Nerve Block (Left, 2019); epidural steroid injection (Left, 2019); vitrectomy (Right, 2024); Medication Injection (Right, 2024); colectomy (N/A, 2025); and Cystoscopy (Bilateral, 2025).      Evaluating Therapist: Vonda Malcolm PT    Room #:  0706/0706-A  Diagnosis:  Diverticulitis of colon with perforation [K57.20]  Perforated diverticulum of large intestine [K57.20]  PMHx/PSHx:  DM, Arthritis  Procedure/Surgery:   LAPAROSCIOPIC, HAND ASSIST, PARTIAL COLECTOMY WITH PRIMARY ANASTAMOSIS AND BLADDER REPAIR WITH TAP BLOCK  LAPAROSCOPIC CYSTOTOMY REPAIR, CYSTOSCOPY, URETERAL CATHETER INSERTION AND REMOVAL, HICKS INSERTION  Precautions:  falls, O2, PCA pump, CBI      Social:  Pt lives with  in a 2 floor plan 7 steps and 1 rails to enter. Has half bath first floor  Prior to admission independent all areas without device     Initial Evaluation  Date: 25 Treatment      Short Term/ Long Term   Goals   Was pt agreeable to Eval/treatment? yes     Does pt have pain? Around ribs     Bed Mobility  Rolling: min assist  Supine to sit:

## 2025-02-08 NOTE — PROGRESS NOTES
Kearney catheter gently irrigated, several large clots noted  Kearney continues to drain watermelon tinge colored urine  Georgie Conn RN

## 2025-02-09 LAB
ANION GAP SERPL CALCULATED.3IONS-SCNC: 7 MMOL/L (ref 7–16)
BASOPHILS # BLD: 0.04 K/UL (ref 0–0.2)
BASOPHILS NFR BLD: 1 % (ref 0–2)
BUN SERPL-MCNC: 21 MG/DL (ref 6–23)
CALCIUM SERPL-MCNC: 8.8 MG/DL (ref 8.6–10.2)
CHLORIDE SERPL-SCNC: 105 MMOL/L (ref 98–107)
CO2 SERPL-SCNC: 27 MMOL/L (ref 22–29)
CREAT SERPL-MCNC: 0.7 MG/DL (ref 0.5–1)
EOSINOPHIL # BLD: 0.08 K/UL (ref 0.05–0.5)
EOSINOPHILS RELATIVE PERCENT: 1 % (ref 0–6)
ERYTHROCYTE [DISTWIDTH] IN BLOOD BY AUTOMATED COUNT: 14.6 % (ref 11.5–15)
GFR, ESTIMATED: 89 ML/MIN/1.73M2
GLUCOSE BLD-MCNC: 100 MG/DL (ref 74–99)
GLUCOSE BLD-MCNC: 115 MG/DL (ref 74–99)
GLUCOSE BLD-MCNC: 118 MG/DL (ref 74–99)
GLUCOSE SERPL-MCNC: 121 MG/DL (ref 74–99)
HCT VFR BLD AUTO: 36.5 % (ref 34–48)
HGB BLD-MCNC: 10.9 G/DL (ref 11.5–15.5)
IMM GRANULOCYTES # BLD AUTO: 0.03 K/UL (ref 0–0.58)
IMM GRANULOCYTES NFR BLD: 0 % (ref 0–5)
LYMPHOCYTES NFR BLD: 1.65 K/UL (ref 1.5–4)
LYMPHOCYTES RELATIVE PERCENT: 19 % (ref 20–42)
MCH RBC QN AUTO: 26.9 PG (ref 26–35)
MCHC RBC AUTO-ENTMCNC: 29.9 G/DL (ref 32–34.5)
MCV RBC AUTO: 90.1 FL (ref 80–99.9)
MONOCYTES NFR BLD: 0.61 K/UL (ref 0.1–0.95)
MONOCYTES NFR BLD: 7 % (ref 2–12)
NEUTROPHILS NFR BLD: 72 % (ref 43–80)
NEUTS SEG NFR BLD: 6.28 K/UL (ref 1.8–7.3)
PLATELET # BLD AUTO: 221 K/UL (ref 130–450)
PMV BLD AUTO: 11 FL (ref 7–12)
POTASSIUM SERPL-SCNC: 4.5 MMOL/L (ref 3.5–5)
RBC # BLD AUTO: 4.05 M/UL (ref 3.5–5.5)
SODIUM SERPL-SCNC: 139 MMOL/L (ref 132–146)
WBC OTHER # BLD: 8.7 K/UL (ref 4.5–11.5)

## 2025-02-09 PROCEDURE — 85025 COMPLETE CBC W/AUTO DIFF WBC: CPT

## 2025-02-09 PROCEDURE — 1200000000 HC SEMI PRIVATE

## 2025-02-09 PROCEDURE — 82962 GLUCOSE BLOOD TEST: CPT

## 2025-02-09 PROCEDURE — 2500000003 HC RX 250 WO HCPCS: Performed by: STUDENT IN AN ORGANIZED HEALTH CARE EDUCATION/TRAINING PROGRAM

## 2025-02-09 PROCEDURE — 6360000002 HC RX W HCPCS

## 2025-02-09 PROCEDURE — 6360000002 HC RX W HCPCS: Performed by: STUDENT IN AN ORGANIZED HEALTH CARE EDUCATION/TRAINING PROGRAM

## 2025-02-09 PROCEDURE — 80048 BASIC METABOLIC PNL TOTAL CA: CPT

## 2025-02-09 RX ADMIN — ENOXAPARIN SODIUM 40 MG: 100 INJECTION SUBCUTANEOUS at 08:31

## 2025-02-09 RX ADMIN — ONDANSETRON 4 MG: 2 INJECTION, SOLUTION INTRAMUSCULAR; INTRAVENOUS at 23:18

## 2025-02-09 RX ADMIN — HYDRALAZINE HYDROCHLORIDE 10 MG: 20 INJECTION, SOLUTION INTRAMUSCULAR; INTRAVENOUS at 16:19

## 2025-02-09 RX ADMIN — WATER 2000 MG: 1 INJECTION INTRAMUSCULAR; INTRAVENOUS; SUBCUTANEOUS at 01:36

## 2025-02-09 NOTE — PLAN OF CARE
Problem: Pain  Goal: Verbalizes/displays adequate comfort level or baseline comfort level  2/8/2025 2129 by Eve Sandra RN  Outcome: Progressing  2/8/2025 1805 by Georgie Conn RN  Outcome: Progressing     Problem: Chronic Conditions and Co-morbidities  Goal: Patient's chronic conditions and co-morbidity symptoms are monitored and maintained or improved  2/8/2025 2129 by Eve Sandra RN  Outcome: Progressing  2/8/2025 1805 by Georgie Conn RN  Outcome: Progressing     Problem: ABCDS Injury Assessment  Goal: Absence of physical injury  Outcome: Progressing     Problem: Safety - Adult  Goal: Free from fall injury  Outcome: Progressing

## 2025-02-09 NOTE — PROGRESS NOTES
2/9/2025 9:36 AM  Sallie Gao  [id number]    Subjective:    She is laying in bed   Denies any discomfort with the reyes     Review of Systems  Constitutional: No fever or chills   Respiratory: negative for cough and hemoptysis  Cardiovascular: negative for chest pain and dyspnea  Gastrointestinal: negative for abdominal pain, diarrhea, nausea and vomiting   : See above  Derm: negative for rash and skin lesion(s)  Neurological: negative for seizures and tremors  Musculoskeletal: Negative    Psychiatric: Negative   All other reviews are negative      Scheduled Meds:   sodium chloride flush  5-40 mL IntraVENous 2 times per day    sodium chloride flush  5-40 mL IntraVENous 2 times per day    enoxaparin  40 mg SubCUTAneous Daily    insulin lispro  0-4 Units SubCUTAneous 4x Daily AC & HS    scopolamine  1 patch TransDERmal Q72H    naloxegol  12.5 mg Oral Daily       Objective:  Vitals:    02/09/25 0713   BP: (!) 145/73   Pulse: 69   Resp: 16   Temp: 97.9 °F (36.6 °C)   SpO2: 95%         Allergies: Metformin, Statins, and Tirzepatide    General Appearance: alert and oriented to person, place and time and in no acute distress  Skin: no rash or erythema  Head: normocephalic and atraumatic  Pulmonary/Chest: normal air movement, no respiratory distress  Abdomen: soft, non-tender, non-distended  Genitourinary: 3 way reyes draining pink-tinged urine, CBI running at a slow rate   Extremities: no cyanosis, clubbing or edema         Labs:     Recent Labs     02/09/25  0305      K 4.5      CO2 27   BUN 21   CREATININE 0.7   GLUCOSE 121*   CALCIUM 8.8       Lab Results   Component Value Date/Time    HGB 10.9 02/09/2025 03:05 AM    HCT 36.5 02/09/2025 03:05 AM       No results found for: \"PSA\"      Assessment/Plan:  POD#2-- LAPAROSCOPIC COLECTOMY, LAPAROSCOPIC CYSTOTOMY REPAIR, CYSTOSCOPY, BILATERAL URETERAL CATHETER INSERTION / REMOVAL    Creatinine 0.7  Continue the reyes   She will be discharged with this   She

## 2025-02-09 NOTE — PROGRESS NOTES
[facility] to [facility] for more PrismaSol fluid as well as confirmed wit phone called that they received order for 10 boxes.

## 2025-02-09 NOTE — PROGRESS NOTES
Department of General Surgery - Adult  Surgical Service   Attending Progress Note      SUBJECTIVE:   Seen and evaluated resting comforatbly in bed in nad. Denies N/V. Expresses that she is thirsty. Notes pain adequately controlled. Denies bowel function. Denies other acute complaints    OBJECTIVE      Physical    VITALS:  BP (!) 173/73   Pulse 70   Temp 98.1 °F (36.7 °C) (Oral)   Resp 16   Ht 1.651 m (5' 5\")   Wt 69.4 kg (153 lb)   SpO2 95%   BMI 25.46 kg/m²   INTAKE/OUTPUT:    Intake/Output Summary (Last 24 hours) at 2/9/2025 1532  Last data filed at 2/9/2025 1400  Gross per 24 hour   Intake 300 ml   Output -6150 ml   Net 6450 ml     URINARY CATHETER OUTPUT (Kearney):     General: Alert and oriented no acute distress  HEENT: Normocephalic atraumatic PERRLA 3 mm bilateral  Cardiovascular: Mild tachycardia but otherwise regular rate and rhythm  Respiratory: Bilateral breath sounds decreased breath sounds bilateral lung fields otherwise clear to auscultation  Abdomen: Soft, nondistended, appropriate tenderness to palpation, incisions clean dry and intact with Dermabond in place  : Three-way Kearney catheter in place with blood tinge urine    Data  CBC:   Lab Results   Component Value Date/Time    WBC 8.7 02/09/2025 03:05 AM    RBC 4.05 02/09/2025 03:05 AM    HGB 10.9 02/09/2025 03:05 AM    HCT 36.5 02/09/2025 03:05 AM    MCV 90.1 02/09/2025 03:05 AM    MCH 26.9 02/09/2025 03:05 AM    MCHC 29.9 02/09/2025 03:05 AM    RDW 14.6 02/09/2025 03:05 AM     02/09/2025 03:05 AM    MPV 11.0 02/09/2025 03:05 AM     CBC with Differential:    Lab Results   Component Value Date/Time    WBC 8.7 02/09/2025 03:05 AM    RBC 4.05 02/09/2025 03:05 AM    HGB 10.9 02/09/2025 03:05 AM    HCT 36.5 02/09/2025 03:05 AM     02/09/2025 03:05 AM    MCV 90.1 02/09/2025 03:05 AM    MCH 26.9 02/09/2025 03:05 AM    MCHC 29.9 02/09/2025 03:05 AM    RDW 14.6 02/09/2025 03:05 AM    LYMPHOPCT 19 02/09/2025 03:05 AM    MONOPCT 7  02/09/2025 03:05 AM    EOSPCT 1 02/09/2025 03:05 AM    BASOPCT 1 02/09/2025 03:05 AM    MONOSABS 0.61 02/09/2025 03:05 AM    LYMPHSABS 1.65 02/09/2025 03:05 AM    EOSABS 0.08 02/09/2025 03:05 AM    BASOSABS 0.04 02/09/2025 03:05 AM     WBC:    Lab Results   Component Value Date/Time    WBC 8.7 02/09/2025 03:05 AM     Platelets:    Lab Results   Component Value Date/Time     02/09/2025 03:05 AM     Hemoglobin/Hematocrit:    Lab Results   Component Value Date/Time    HGB 10.9 02/09/2025 03:05 AM    HCT 36.5 02/09/2025 03:05 AM     CMP:    Lab Results   Component Value Date/Time     02/09/2025 03:05 AM    K 4.5 02/09/2025 03:05 AM    K 4.0 08/15/2018 08:05 AM     02/09/2025 03:05 AM    CO2 27 02/09/2025 03:05 AM    BUN 21 02/09/2025 03:05 AM    CREATININE 0.7 02/09/2025 03:05 AM    GFRAA >60 02/13/2020 09:09 AM    LABGLOM 89 02/09/2025 03:05 AM    GLUCOSE 121 02/09/2025 03:05 AM    GLUCOSE 235 10/01/2011 03:10 PM    CALCIUM 8.8 02/09/2025 03:05 AM    BILITOT 0.6 11/10/2024 04:59 AM    ALKPHOS 67 11/10/2024 04:59 AM    AST 14 11/10/2024 04:59 AM    ALT 8 11/10/2024 04:59 AM     BMP:    Lab Results   Component Value Date/Time     02/09/2025 03:05 AM    K 4.5 02/09/2025 03:05 AM    K 4.0 08/15/2018 08:05 AM     02/09/2025 03:05 AM    CO2 27 02/09/2025 03:05 AM    BUN 21 02/09/2025 03:05 AM    CREATININE 0.7 02/09/2025 03:05 AM    CALCIUM 8.8 02/09/2025 03:05 AM    GFRAA >60 02/13/2020 09:09 AM    LABGLOM 89 02/09/2025 03:05 AM    GLUCOSE 121 02/09/2025 03:05 AM    GLUCOSE 235 10/01/2011 03:10 PM     Sodium:    Lab Results   Component Value Date/Time     02/09/2025 03:05 AM     Potassium:    Lab Results   Component Value Date/Time    K 4.5 02/09/2025 03:05 AM    K 4.0 08/15/2018 08:05 AM     BUN/Creatinine:    Lab Results   Component Value Date/Time    BUN 21 02/09/2025 03:05 AM    CREATININE 0.7 02/09/2025 03:05 AM     Hepatic Function Panel:    Lab Results   Component Value Date/Time

## 2025-02-10 LAB
ANION GAP SERPL CALCULATED.3IONS-SCNC: 15 MMOL/L (ref 7–16)
BASOPHILS # BLD: 0.02 K/UL (ref 0–0.2)
BASOPHILS NFR BLD: 0 % (ref 0–2)
BUN SERPL-MCNC: 16 MG/DL (ref 6–23)
CALCIUM SERPL-MCNC: 9.1 MG/DL (ref 8.6–10.2)
CHLORIDE SERPL-SCNC: 99 MMOL/L (ref 98–107)
CO2 SERPL-SCNC: 23 MMOL/L (ref 22–29)
CREAT SERPL-MCNC: 0.6 MG/DL (ref 0.5–1)
EOSINOPHIL # BLD: 0 K/UL (ref 0.05–0.5)
EOSINOPHILS RELATIVE PERCENT: 0 % (ref 0–6)
ERYTHROCYTE [DISTWIDTH] IN BLOOD BY AUTOMATED COUNT: 14 % (ref 11.5–15)
GFR, ESTIMATED: >90 ML/MIN/1.73M2
GLUCOSE BLD-MCNC: 129 MG/DL (ref 74–99)
GLUCOSE BLD-MCNC: 131 MG/DL (ref 74–99)
GLUCOSE BLD-MCNC: 146 MG/DL (ref 74–99)
GLUCOSE BLD-MCNC: 162 MG/DL (ref 74–99)
GLUCOSE SERPL-MCNC: 173 MG/DL (ref 74–99)
HCT VFR BLD AUTO: 40.6 % (ref 34–48)
HGB BLD-MCNC: 12.4 G/DL (ref 11.5–15.5)
IMM GRANULOCYTES # BLD AUTO: 0.09 K/UL (ref 0–0.58)
IMM GRANULOCYTES NFR BLD: 1 % (ref 0–5)
LYMPHOCYTES NFR BLD: 0.6 K/UL (ref 1.5–4)
LYMPHOCYTES RELATIVE PERCENT: 6 % (ref 20–42)
MAGNESIUM SERPL-MCNC: 2.1 MG/DL (ref 1.6–2.6)
MCH RBC QN AUTO: 27 PG (ref 26–35)
MCHC RBC AUTO-ENTMCNC: 30.5 G/DL (ref 32–34.5)
MCV RBC AUTO: 88.3 FL (ref 80–99.9)
MONOCYTES NFR BLD: 0.55 K/UL (ref 0.1–0.95)
MONOCYTES NFR BLD: 6 % (ref 2–12)
NEUTROPHILS NFR BLD: 87 % (ref 43–80)
NEUTS SEG NFR BLD: 8.75 K/UL (ref 1.8–7.3)
PHOSPHATE SERPL-MCNC: 3 MG/DL (ref 2.5–4.5)
PLATELET # BLD AUTO: 236 K/UL (ref 130–450)
PMV BLD AUTO: 11.3 FL (ref 7–12)
POTASSIUM SERPL-SCNC: 4.3 MMOL/L (ref 3.5–5)
RBC # BLD AUTO: 4.6 M/UL (ref 3.5–5.5)
SODIUM SERPL-SCNC: 137 MMOL/L (ref 132–146)
WBC OTHER # BLD: 10 K/UL (ref 4.5–11.5)

## 2025-02-10 PROCEDURE — 1200000000 HC SEMI PRIVATE

## 2025-02-10 PROCEDURE — 83735 ASSAY OF MAGNESIUM: CPT

## 2025-02-10 PROCEDURE — 80048 BASIC METABOLIC PNL TOTAL CA: CPT

## 2025-02-10 PROCEDURE — 2500000003 HC RX 250 WO HCPCS: Performed by: STUDENT IN AN ORGANIZED HEALTH CARE EDUCATION/TRAINING PROGRAM

## 2025-02-10 PROCEDURE — 6370000000 HC RX 637 (ALT 250 FOR IP)

## 2025-02-10 PROCEDURE — 2500000003 HC RX 250 WO HCPCS

## 2025-02-10 PROCEDURE — 51700 IRRIGATION OF BLADDER: CPT

## 2025-02-10 PROCEDURE — 6360000002 HC RX W HCPCS

## 2025-02-10 PROCEDURE — 36415 COLL VENOUS BLD VENIPUNCTURE: CPT

## 2025-02-10 PROCEDURE — 6370000000 HC RX 637 (ALT 250 FOR IP): Performed by: STUDENT IN AN ORGANIZED HEALTH CARE EDUCATION/TRAINING PROGRAM

## 2025-02-10 PROCEDURE — 2580000003 HC RX 258: Performed by: STUDENT IN AN ORGANIZED HEALTH CARE EDUCATION/TRAINING PROGRAM

## 2025-02-10 PROCEDURE — 82962 GLUCOSE BLOOD TEST: CPT

## 2025-02-10 PROCEDURE — 84100 ASSAY OF PHOSPHORUS: CPT

## 2025-02-10 PROCEDURE — 85025 COMPLETE CBC W/AUTO DIFF WBC: CPT

## 2025-02-10 RX ORDER — OXYCODONE HYDROCHLORIDE 5 MG/1
5 TABLET ORAL EVERY 4 HOURS PRN
Status: DISCONTINUED | OUTPATIENT
Start: 2025-02-10 | End: 2025-02-10

## 2025-02-10 RX ORDER — OXYCODONE HYDROCHLORIDE 5 MG/1
10 TABLET ORAL EVERY 4 HOURS PRN
Status: DISCONTINUED | OUTPATIENT
Start: 2025-02-10 | End: 2025-02-10

## 2025-02-10 RX ORDER — SODIUM CHLORIDE, SODIUM LACTATE, POTASSIUM CHLORIDE, CALCIUM CHLORIDE 600; 310; 30; 20 MG/100ML; MG/100ML; MG/100ML; MG/100ML
INJECTION, SOLUTION INTRAVENOUS CONTINUOUS
Status: DISCONTINUED | OUTPATIENT
Start: 2025-02-10 | End: 2025-02-12

## 2025-02-10 RX ORDER — ACETAMINOPHEN 325 MG/1
650 TABLET ORAL EVERY 6 HOURS SCHEDULED
Status: DISCONTINUED | OUTPATIENT
Start: 2025-02-10 | End: 2025-02-12

## 2025-02-10 RX ORDER — OXYCODONE HYDROCHLORIDE 5 MG/1
5 TABLET ORAL EVERY 4 HOURS PRN
Status: DISCONTINUED | OUTPATIENT
Start: 2025-02-10 | End: 2025-02-14 | Stop reason: HOSPADM

## 2025-02-10 RX ADMIN — NALOXEGOL OXALATE 12.5 MG: 12.5 TABLET, FILM COATED ORAL at 08:08

## 2025-02-10 RX ADMIN — ACETAMINOPHEN 650 MG: 325 TABLET ORAL at 23:41

## 2025-02-10 RX ADMIN — ONDANSETRON 4 MG: 2 INJECTION, SOLUTION INTRAMUSCULAR; INTRAVENOUS at 05:47

## 2025-02-10 RX ADMIN — ENOXAPARIN SODIUM 40 MG: 100 INJECTION SUBCUTANEOUS at 08:08

## 2025-02-10 RX ADMIN — ACETAMINOPHEN 650 MG: 325 TABLET ORAL at 16:50

## 2025-02-10 RX ADMIN — ONDANSETRON 4 MG: 2 INJECTION, SOLUTION INTRAMUSCULAR; INTRAVENOUS at 11:56

## 2025-02-10 RX ADMIN — SODIUM CHLORIDE, POTASSIUM CHLORIDE, SODIUM LACTATE AND CALCIUM CHLORIDE: 600; 310; 30; 20 INJECTION, SOLUTION INTRAVENOUS at 23:43

## 2025-02-10 RX ADMIN — ACETAMINOPHEN 650 MG: 325 TABLET ORAL at 08:08

## 2025-02-10 RX ADMIN — SODIUM CHLORIDE, PRESERVATIVE FREE 10 ML: 5 INJECTION INTRAVENOUS at 20:33

## 2025-02-10 RX ADMIN — HYDRALAZINE HYDROCHLORIDE 10 MG: 20 INJECTION, SOLUTION INTRAMUSCULAR; INTRAVENOUS at 00:30

## 2025-02-10 RX ADMIN — SODIUM CHLORIDE, PRESERVATIVE FREE 10 ML: 5 INJECTION INTRAVENOUS at 21:47

## 2025-02-10 RX ADMIN — SODIUM CHLORIDE, POTASSIUM CHLORIDE, SODIUM LACTATE AND CALCIUM CHLORIDE: 600; 310; 30; 20 INJECTION, SOLUTION INTRAVENOUS at 04:55

## 2025-02-10 ASSESSMENT — PAIN SCALES - GENERAL
PAINLEVEL_OUTOF10: 4
PAINLEVEL_OUTOF10: 3
PAINLEVEL_OUTOF10: 0
PAINLEVEL_OUTOF10: 0

## 2025-02-10 ASSESSMENT — PAIN DESCRIPTION - DESCRIPTORS
DESCRIPTORS: DISCOMFORT
DESCRIPTORS: ACHING

## 2025-02-10 ASSESSMENT — PAIN DESCRIPTION - LOCATION
LOCATION: ABDOMEN
LOCATION: HEAD

## 2025-02-10 NOTE — PROGRESS NOTES
GENERAL SURGERY  DAILY PROGRESS NOTE    Patient's Name/Date of Birth: Sallie Gao / 1955    Date: February 10, 2025     No chief complaint on file.       Subjective:  Feeling good this morning. Has slight headache, mainly using PCA to manage that as opposed to abdominal pain      Objective:  Last 24Hrs  Temp  Av.2 °F (36.8 °C)  Min: 98.1 °F (36.7 °C)  Max: 98.4 °F (36.9 °C)  Resp  Av  Min: 16  Max: 16  Pulse  Av.6  Min: 70  Max: 86  Systolic (24hrs), Avg:160 , Min:145 , Max:173     Diastolic (24hrs), Av, Min:60, Max:92    SpO2  Av.8 %  Min: 94 %  Max: 95 %    I/O last 3 completed shifts:  In: 200 [Other:200]  Out: -4100       General: resting in bed  Cardiovascular: Warm throughout, no edema  Respiratory: no respiratory distress, equal chest rise on RA  Abdomen: soft, appropriate periincisional tenderness to palpation, nondistended. Incisions C/D/I  Skin: no obvious rashes or lesions appreciated, no jaundice  Extremities: moving all extremities      CBC  Recent Labs     25  0405 25  0305 02/10/25  0705   WBC 10.6 8.7 10.0   RBC 4.42 4.05 4.60   HGB 12.1 10.9* 12.4   HCT 38.8 36.5 40.6   MCV 87.8 90.1 88.3   MCH 27.4 26.9 27.0   MCHC 31.2* 29.9* 30.5*   RDW 14.3 14.6 14.0    221 236   MPV 11.4 11.0 11.3       CMP  Recent Labs     25  0405 25  0305    139   K 4.7 4.5   * 105   CO2 25 27   BUN 21 21   CREATININE 0.7 0.7   GLUCOSE 158* 121*   CALCIUM 8.8 8.8         Assessment/Plan:    Patient Active Problem List   Diagnosis    Type 2 diabetes mellitus without complication (HCC)    Primary insomnia    Sleep disorder    Gastroesophageal reflux disease without esophagitis    Lumbar pain with radiation down left leg    Neural foraminal stenosis of lumbar spine    Radiculopathy, lumbosacral region    AMS (altered mental status)    Dizziness    Synovitis and tenosynovitis    Diverticulitis of large intestine with abscess without bleeding

## 2025-02-10 NOTE — ACP (ADVANCE CARE PLANNING)
Advance Care Planning   Healthcare Decision Maker:    Primary Decision Maker: Momo Gao  - [facility] - [phone]

## 2025-02-10 NOTE — PROGRESS NOTES
Patient having slight hematuria in reyes catheter. Notified CLIFF Horner. Ordered to irrigate x1 then Q6 irrigation. Irrigated at 1630. Notified NP of results - no clots noted. Will continue to monitor.     Also, informed this RN to restart CBI if urine becomes darker or if clots are present.

## 2025-02-10 NOTE — DISCHARGE INSTRUCTIONS
Call upon discharge to schedule a follow-up visit with Dr. Lima ([facility]) at [phone]      Learning About Indwelling Urinary Catheter Care to Prevent Infection  Overview     A urinary catheter is a flexible plastic tube that's used to drain urine from your bladder when you can't urinate on your own. The catheter allows urine to drain from the bladder into a bag.  Two types of drainage bags may be used with a urinary catheter.  A bedside bag is a large bag that you can hang on the side of your bed or on a chair. You can use it overnight or anytime you will be sitting or lying down for a long time.  A leg bag is a small bag that you can use during the day. It is usually attached to your thigh or calf and hidden under your clothes.  Having a urinary catheter increases your risk of getting a urinary tract infection. Germs may get on the catheter and cause an infection in your bladder or kidneys. The longer you have a catheter, the more likely it is that you will get an infection. You can help prevent this problem with good hygiene and careful handling of your catheter and drainage bags.  How can you help prevent infection?  Take care to stay clean  Always wash your hands well before and after you handle your catheter.  Clean the skin around the catheter daily using soap and water. Dry with a clean towel afterward. You can shower with your catheter and drainage bag in place unless your doctor told you not to.  When you clean around the catheter, check the surrounding skin for signs of infection. Look for things like pus and irritated, swollen, red, or tender skin around the catheter.  Be careful with your drainage bag  Always keep the drainage bag below the level of your bladder. This will help keep urine from flowing back into your bladder.  Check often to see that urine is flowing through the catheter into the drainage bag.  Empty the drainage bag when it is half full. This will keep it from  overflowing or backing up.  When you empty the drainage bag, do not let the tubing or drain spout touch anything.  Keep the cap that comes with the tubing, and cover the tip of the tubing when not in use.  Be careful with your catheter  Do not unhook the catheter from the drain tube until you are ready to change the tubing and bag. That could let germs get into the tube.  Make sure that the catheter tubing does not get twisted or kinked.  Do not tug or pull on the catheter. And make sure that the drainage bag does not drag or pull on the catheter.  Do not put powder or lotion on the skin around the catheter.  Talk with your doctor about your options for sexual intercourse while wearing a catheter.  How do you empty the bag?  If your doctor has asked you to keep a record, write down the amount of urine in the bag before you empty it.  Wash your hands before and after you touch the bag.  Remove the drain spout from its sleeve at the bottom of the drainage bag.  Open the valve on the drain spout. Let the urine flow out into the toilet or a container. Be careful not to let the tubing or drain spout touch anything.  After you empty the bag, close the valve. Then put the drain spout back into its sleeve at the bottom of the collection bag.  How do you switch to a bedside bag for overnight use?  Wash your hands before and after you handle the bags.  Empty the leg bag that is attached to the tubing and catheter.  Put a clean towel under the tubing attached to the leg bag.  Use an alcohol wipe to clean the tip of the tubing attached to the bedside bag.  To stop the flow of urine, pinch the catheter with your fingers just above the tubing connection.  Use a twisting motion to disconnect the leg bag tubing from the catheter.  Then securely connect the catheter to the tubing from the bedside bag.  How do you clean a bedside bag?  Many people clean their bedside bag in the morning if they switch to a leg bag.  To clean a bedside  drainage bag:  Remove the bedside bag and attach the leg bag.  Fill the bedside bag with 2 parts vinegar and 3 parts water. Let it stand for 20 minutes.  Empty the bag, and let it air dry.  When should you call for help?   Call your doctor now or seek immediate medical care if:    You have symptoms of a urinary infection. These may include:  Pain or burning when you urinate.  A frequent need to urinate without being able to pass much urine.  Pain in the flank, which is just below the rib cage and above the waist on either side of the back.  Blood in your urine.  A fever.     Your urine smells bad.     You see large blood clots in your urine.     No urine or very little urine is flowing into the bag for 4 or more hours.   Watch closely for changes in your health, and be sure to contact your doctor if:    The area around the catheter becomes irritated, swollen, red, or tender, or there is pus draining from it.     Urine is leaking from the place where the catheter enters your body.   Follow-up care is a key part of your treatment and safety. Be sure to make and go to all appointments, and call your doctor if you are having problems. It's also a good idea to know your test results and keep a list of the medicines you take.  Where can you learn more?  Go to [email or web address] and sign in to your [email or web address] account. Enter U010 in the Search Health Information box to learn more about \"Learning About Indwelling Urinary Catheter Care to Prevent Infection.\"     If you do not have an account, please click on the \"Sign Up Now\" link.  Current as of: February 10, 2021               Content Version: 12.9  © 2859-5768 [employer].   Care instructions adapted under license by [employer]. If you have questions about a medical condition or this instruction, always ask your healthcare professional. [employer] disclaims any warranty or liability for your use of this information.

## 2025-02-10 NOTE — PLAN OF CARE
Problem: Discharge Planning  Goal: Discharge to home or other facility with appropriate resources  Outcome: Progressing     Problem: Pain  Goal: Verbalizes/displays adequate comfort level or baseline comfort level  2/10/2025 1027 by Renetta Haynes, RN  Outcome: Progressing     Problem: Chronic Conditions and Co-morbidities  Goal: Patient's chronic conditions and co-morbidity symptoms are monitored and maintained or improved  2/10/2025 1027 by Renetta Haynes, RN  Outcome: Progressing     Problem: ABCDS Injury Assessment  Goal: Absence of physical injury  2/10/2025 1027 by Renetta Haynes, RN  Outcome: Progressing     Problem: Safety - Adult  Goal: Free from fall injury  2/10/2025 1027 by Renetta Haynes, RN  Outcome: Progressing

## 2025-02-10 NOTE — PROGRESS NOTES
HR in 150s this morning when doing vitals. Surgical resident notified. EKG taken per order. Notified dr of results.    0807: HR 87.     Will continue to monitor.

## 2025-02-10 NOTE — DISCHARGE INSTR - COC
Continuity of Care Form    Patient Name: Sallie Gao   :  1955  MRN:  [id number]    Admit date:  2025  Discharge date:  ***    Code Status Order: Full Code   Advance Directives:   Advance Care Flowsheet Documentation        Date/Time Healthcare Directive Type of Healthcare Directive Copy in Chart Healthcare Agent Appointed Healthcare Agent's Name Healthcare Agent's Phone Number    25 1027 No, patient does not have an advance directive for healthcare treatment  --  --  --  --  --                     Admitting Physician:  Virgilio King MD  PCP: Neil Galarza MD    Discharging Nurse: ***  Discharging Hospital Unit/Room#: 0706/0706-A  Discharging Unit Phone Number: ***    Emergency Contact:   Extended Emergency Contact Information  Primary Emergency Contact: Momo Gao  Address: [address] [place] [address]  Home Phone: [phone]  Mobile Phone: [phone]  Relation: Spouse  Secondary Emergency Contact: Aniyah Cameron   [facility]  Home Phone: [phone]  Mobile Phone: [phone]  Relation: Child    Past Surgical History:  Past Surgical History:   Procedure Laterality Date    ANESTHESIA NERVE BLOCK Left 2019    LEFT L4-5 TRANSFORAMINAL EPIDURAL STEROID INJECTION performed by Zohreh Cazares DO at [facility] OR    ANESTHESIA NERVE BLOCK Left 2019    LEFT L4-5 TRANSFORAMINAL EPIDURAL STEROID INJECTION performed by Zohreh Cazares DO at [facility] OR    BACK SURGERY      Lumbar, laminectomy, Dr. Ross    CATARACT REMOVAL      COLECTOMY N/A 2025    LAPAROSCIOPIC, HAND ASSIST, PARTIAL COLECTOMY WITH PRIMARY ANASTAMOSIS AND BLADDER REPAIR WITH TAP BLOCK performed by Virgilio King MD at [facility] OR    CYSTOSCOPY Bilateral 2025    LAPAROSCOPIC CYSTOTOMY REPAIR, CYSTOSCOPY, URETERAL CATHETER INSERTION AND REMOVAL, HICKS INSERTION performed by Raulito Lima MD at [facility] OR     3 completed shifts:  In: 200 [Other:200]  Out: -4100     Safety Concerns:     { SERVANDO Safety Concerns:503715794}    Impairments/Disabilities:      { SERVANDO Impairments/Disabilities:914709836}    Nutrition Therapy:  Current Nutrition Therapy:   { SERAVNDO Diet List:068540684}    Routes of Feeding: {CHP DME Other Feedings:328197272}  Liquids: {Slp liquid thickness:69935}  Daily Fluid Restriction: {CHP DME Yes amt example:945285972}  Last Modified Barium Swallow with Video (Video Swallowing Test): {Done Not Done Date:}    Treatments at the Time of Hospital Discharge:   Respiratory Treatments: ***  Oxygen Therapy:  {Therapy; copd oxygen:08726}  Ventilator:    { CC Vent List:256272599}    Rehab Therapies: {THERAPEUTIC INTERVENTION:3876862626}  Weight Bearing Status/Restrictions: { CC Weight Bearin}  Other Medical Equipment (for information only, NOT a DME order):  {EQUIPMENT:263224902}  Other Treatments: ***    Patient's personal belongings (please select all that are sent with patient):  {[facility] DME Belongings:966603884}    RN SIGNATURE:  {Esignature:849315970}    CASE MANAGEMENT/SOCIAL WORK SECTION    Inpatient Status Date: 25    Readmission Risk Assessment Score:  [facility] RISK OF UNPLANNED READMISSION 2.0             6.9 Total Score        Discharging to Facility/ Agency   Name: [facility]  Address: [address] [place] [address]   Phone: [phone]  Fax:    Dialysis Facility (if applicable)   Name:  Address:  Dialysis Schedule:  Phone:  Fax:    / signature: Electronically signed by Je Rodriguez RN on 2/10/25 at 10:12 AM EST    PHYSICIAN SECTION    Prognosis: {Prognosis:1101526446}    Condition at Discharge: { Patient Condition:621276374}    Rehab Potential (if transferring to Rehab): {Prognosis:1640076890}    Recommended Labs or Other Treatments After Discharge: ***    Physician Certification: I certify the above information and transfer of Sallie

## 2025-02-10 NOTE — PROGRESS NOTES
2/10/2025 12:11 PM  Sallie Gao  [id number]    Subjective:    She is sleeping  Arouses to verbal stimuli  Kearney catheter is currently draining clear urine with CBI at a slow rate  Her  is present    Review of Systems  Constitutional: No fever or chills   Respiratory: negative for cough and hemoptysis  Cardiovascular: negative for chest pain and dyspnea  Gastrointestinal: Positive for abdominal pain  : See above  Derm: negative for rash and skin lesion(s)  Neurological: negative for seizures and tremors  Musculoskeletal: Negative    Psychiatric: Negative   All other reviews are negative      Scheduled Meds:   acetaminophen  650 mg Oral 4 times per day    sodium chloride flush  5-40 mL IntraVENous 2 times per day    sodium chloride flush  5-40 mL IntraVENous 2 times per day    enoxaparin  40 mg SubCUTAneous Daily    insulin lispro  0-4 Units SubCUTAneous 4x Daily AC & HS    scopolamine  1 patch TransDERmal Q72H    naloxegol  12.5 mg Oral Daily       Objective:  Vitals:    02/10/25 0807   BP:    Pulse: 87   Resp:    Temp:    SpO2:          Allergies: Metformin, Statins, and Tirzepatide    General Appearance: alert and oriented to person, place and time and in no acute distress  Skin: no rash or erythema  Head: normocephalic and atraumatic  Pulmonary/Chest: normal air movement, no respiratory distress  Abdomen: soft, non-tender, non-distended  Genitourinary: Kearney with clear urine CBI at slow rate  Extremities: no cyanosis, clubbing or edema         Labs:     Recent Labs     02/10/25  0705      K 4.3   CL 99   CO2 23   BUN 16   CREATININE 0.6   GLUCOSE 173*   CALCIUM 9.1       Lab Results   Component Value Date/Time    HGB 12.4 02/10/2025 07:05 AM    HCT 40.6 02/10/2025 07:05 AM       No results found for: \"PSA\"      Assessment/Plan:  POD #3--laparoscopic colectomy, laparoscopic cystotomy repair, cystoscopy, bilateral ureteral catheter insertion/removal  Bladder injury during colon surgery     Kearney  hand irrigated by me, no clots, urine clear  Stop the CBI   Continue the reyes   She will need cystogram in about 7 days  She states this is scheduled at [facility] on 2/17  She will be discharged home with the reyes catheter  Will follow up in our office after cystogram is performed to determine removal of reyes if no extravasation on cystogram  Hold on additional  Interventions  Please call urology with any worsening hematuria       BRONWYN Lees - CNP   [facility]NA  Urology

## 2025-02-10 NOTE — CARE COORDINATION
Pt underwent partial colectomy and bladder repair on 2/7 with Dr. King and Dr. Lima. Urology is following with reyes cath in place, CBI running and plans to keep reyes in on DC. Pt worked with PT on 2/8 with [facility], 16/24, ambulated a few steps from bed to chair, no device, min assist. Dilaudid PCA discontinued.    Met with pt at bedside to discuss transition of care planning. Pt's PCP is Lenora Galarza and she uses [facility] in [place]. Pt lives with her  in a 2 story home, 7 FAUSTINA with BL handrails. Pt indicates there is a bed/bath on 1st floor. Only DME owned is a working glucometer. No hx of HHC/JENNIFER. Discuss HHC for SN/PT/OT on DC and pt is agreeable. Pt was provided with HHC choice list but indicated no preference; referral called to Jo at [facility]. HHC orders placed. Pt states her  will provide transportation home on DC. Await acceptance from Jo.     1012  Received notification from Jo pt accepted. SERVANDO/destination complete.     Je Rodriguez, MSN, RN  Manager Care Coordination  [facility]

## 2025-02-11 LAB
ANION GAP SERPL CALCULATED.3IONS-SCNC: 9 MMOL/L (ref 7–16)
BASOPHILS # BLD: 0.02 K/UL (ref 0–0.2)
BASOPHILS NFR BLD: 0 % (ref 0–2)
BUN SERPL-MCNC: 13 MG/DL (ref 6–23)
CALCIUM SERPL-MCNC: 9 MG/DL (ref 8.6–10.2)
CHLORIDE SERPL-SCNC: 104 MMOL/L (ref 98–107)
CO2 SERPL-SCNC: 27 MMOL/L (ref 22–29)
CREAT SERPL-MCNC: 0.6 MG/DL (ref 0.5–1)
EOSINOPHIL # BLD: 0.09 K/UL (ref 0.05–0.5)
EOSINOPHILS RELATIVE PERCENT: 1 % (ref 0–6)
ERYTHROCYTE [DISTWIDTH] IN BLOOD BY AUTOMATED COUNT: 14.1 % (ref 11.5–15)
GFR, ESTIMATED: >90 ML/MIN/1.73M2
GLUCOSE BLD-MCNC: 120 MG/DL (ref 74–99)
GLUCOSE BLD-MCNC: 130 MG/DL (ref 74–99)
GLUCOSE BLD-MCNC: 137 MG/DL (ref 74–99)
GLUCOSE BLD-MCNC: 145 MG/DL (ref 74–99)
GLUCOSE SERPL-MCNC: 119 MG/DL (ref 74–99)
HCT VFR BLD AUTO: 36.1 % (ref 34–48)
HGB BLD-MCNC: 11.2 G/DL (ref 11.5–15.5)
IMM GRANULOCYTES # BLD AUTO: 0.03 K/UL (ref 0–0.58)
IMM GRANULOCYTES NFR BLD: 0 % (ref 0–5)
LYMPHOCYTES NFR BLD: 1.73 K/UL (ref 1.5–4)
LYMPHOCYTES RELATIVE PERCENT: 21 % (ref 20–42)
MCH RBC QN AUTO: 27.1 PG (ref 26–35)
MCHC RBC AUTO-ENTMCNC: 31 G/DL (ref 32–34.5)
MCV RBC AUTO: 87.4 FL (ref 80–99.9)
MONOCYTES NFR BLD: 0.74 K/UL (ref 0.1–0.95)
MONOCYTES NFR BLD: 9 % (ref 2–12)
NEUTROPHILS NFR BLD: 68 % (ref 43–80)
NEUTS SEG NFR BLD: 5.53 K/UL (ref 1.8–7.3)
PLATELET # BLD AUTO: 275 K/UL (ref 130–450)
PMV BLD AUTO: 11.4 FL (ref 7–12)
POTASSIUM SERPL-SCNC: 4.1 MMOL/L (ref 3.5–5)
RBC # BLD AUTO: 4.13 M/UL (ref 3.5–5.5)
SODIUM SERPL-SCNC: 140 MMOL/L (ref 132–146)
WBC OTHER # BLD: 8.1 K/UL (ref 4.5–11.5)

## 2025-02-11 PROCEDURE — 1200000000 HC SEMI PRIVATE

## 2025-02-11 PROCEDURE — 6360000002 HC RX W HCPCS

## 2025-02-11 PROCEDURE — 85025 COMPLETE CBC W/AUTO DIFF WBC: CPT

## 2025-02-11 PROCEDURE — 36415 COLL VENOUS BLD VENIPUNCTURE: CPT

## 2025-02-11 PROCEDURE — 97530 THERAPEUTIC ACTIVITIES: CPT

## 2025-02-11 PROCEDURE — 2580000003 HC RX 258: Performed by: STUDENT IN AN ORGANIZED HEALTH CARE EDUCATION/TRAINING PROGRAM

## 2025-02-11 PROCEDURE — 82962 GLUCOSE BLOOD TEST: CPT

## 2025-02-11 PROCEDURE — 80048 BASIC METABOLIC PNL TOTAL CA: CPT

## 2025-02-11 PROCEDURE — 6370000000 HC RX 637 (ALT 250 FOR IP)

## 2025-02-11 PROCEDURE — 6370000000 HC RX 637 (ALT 250 FOR IP): Performed by: STUDENT IN AN ORGANIZED HEALTH CARE EDUCATION/TRAINING PROGRAM

## 2025-02-11 PROCEDURE — 2500000003 HC RX 250 WO HCPCS

## 2025-02-11 PROCEDURE — 97535 SELF CARE MNGMENT TRAINING: CPT

## 2025-02-11 RX ADMIN — SODIUM CHLORIDE, PRESERVATIVE FREE 10 ML: 5 INJECTION INTRAVENOUS at 21:24

## 2025-02-11 RX ADMIN — SODIUM CHLORIDE, POTASSIUM CHLORIDE, SODIUM LACTATE AND CALCIUM CHLORIDE: 600; 310; 30; 20 INJECTION, SOLUTION INTRAVENOUS at 17:45

## 2025-02-11 RX ADMIN — ACETAMINOPHEN 650 MG: 325 TABLET ORAL at 17:42

## 2025-02-11 RX ADMIN — HYDRALAZINE HYDROCHLORIDE 10 MG: 20 INJECTION, SOLUTION INTRAMUSCULAR; INTRAVENOUS at 19:34

## 2025-02-11 RX ADMIN — ONDANSETRON 4 MG: 2 INJECTION, SOLUTION INTRAMUSCULAR; INTRAVENOUS at 10:25

## 2025-02-11 RX ADMIN — HYDRALAZINE HYDROCHLORIDE 10 MG: 20 INJECTION, SOLUTION INTRAMUSCULAR; INTRAVENOUS at 10:20

## 2025-02-11 RX ADMIN — ACETAMINOPHEN 650 MG: 325 TABLET ORAL at 23:51

## 2025-02-11 RX ADMIN — NALOXEGOL OXALATE 12.5 MG: 12.5 TABLET, FILM COATED ORAL at 08:20

## 2025-02-11 RX ADMIN — ENOXAPARIN SODIUM 40 MG: 100 INJECTION SUBCUTANEOUS at 08:20

## 2025-02-11 RX ADMIN — ACETAMINOPHEN 650 MG: 325 TABLET ORAL at 05:36

## 2025-02-11 RX ADMIN — SODIUM CHLORIDE, POTASSIUM CHLORIDE, SODIUM LACTATE AND CALCIUM CHLORIDE: 600; 310; 30; 20 INJECTION, SOLUTION INTRAVENOUS at 09:30

## 2025-02-11 RX ADMIN — HYDRALAZINE HYDROCHLORIDE 10 MG: 20 INJECTION, SOLUTION INTRAMUSCULAR; INTRAVENOUS at 21:32

## 2025-02-11 ASSESSMENT — PAIN SCALES - GENERAL
PAINLEVEL_OUTOF10: 0
PAINLEVEL_OUTOF10: 3
PAINLEVEL_OUTOF10: 5

## 2025-02-11 ASSESSMENT — PAIN DESCRIPTION - LOCATION
LOCATION: HEAD
LOCATION: ABDOMEN

## 2025-02-11 ASSESSMENT — PAIN DESCRIPTION - ORIENTATION: ORIENTATION: UPPER

## 2025-02-11 ASSESSMENT — PAIN DESCRIPTION - DESCRIPTORS
DESCRIPTORS: PRESSURE
DESCRIPTORS: ACHING

## 2025-02-11 NOTE — CARE COORDINATION
CASE MANAGEMENT.... Chart reviewed. POD 4 partial colectomy and bladder repair. Kearney cath placed and will remain intact at [facility]. Yesterday, CBI was stopped and patient started on clear liquids. On ivf. 1 dose of iv apresoline given this am for elevated bp. Prn pain meds on board. Discharge plan is home with [facility]. Orders in place. Will follow.

## 2025-02-11 NOTE — PLAN OF CARE
Problem: Discharge Planning  Goal: Discharge to home or other facility with appropriate resources  2/11/2025 0015 by Jenn Dang RN  Outcome: Progressing  2/10/2025 1027 by Renetta Haynes RN  Outcome: Progressing     Problem: Pain  Goal: Verbalizes/displays adequate comfort level or baseline comfort level  2/11/2025 0015 by Jenn Dang RN  Outcome: Progressing  2/10/2025 1027 by Renetta Haynes RN  Outcome: Progressing     Problem: Chronic Conditions and Co-morbidities  Goal: Patient's chronic conditions and co-morbidity symptoms are monitored and maintained or improved  2/11/2025 0015 by Jenn Dang RN  Outcome: Progressing  2/10/2025 1027 by Renetta Haynes RN  Outcome: Progressing     Problem: ABCDS Injury Assessment  Goal: Absence of physical injury  2/11/2025 0015 by Jenn Dang RN  Outcome: Progressing  2/10/2025 1027 by Renetta Haynes RN  Outcome: Progressing     Problem: Safety - Adult  Goal: Free from fall injury  2/11/2025 0015 by Jenn Dang RN  Outcome: Progressing  2/10/2025 1027 by Renetta Haynes RN  Outcome: Progressing

## 2025-02-11 NOTE — PLAN OF CARE
Problem: Discharge Planning  Goal: Discharge to home or other facility with appropriate resources  2/11/2025 1100 by Renetta Haynes RN  Outcome: Progressing     Problem: Pain  Goal: Verbalizes/displays adequate comfort level or baseline comfort level  2/11/2025 1100 by Renetta Haynes RN  Outcome: Progressing     Problem: Chronic Conditions and Co-morbidities  Goal: Patient's chronic conditions and co-morbidity symptoms are monitored and maintained or improved  2/11/2025 1100 by Renetta Haynes RN  Outcome: Progressing     Problem: ABCDS Injury Assessment  Goal: Absence of physical injury  2/11/2025 1100 by Renetta Haynes RN  Outcome: Progressing     Problem: Safety - Adult  Goal: Free from fall injury  2/11/2025 1100 by Renetta Haynes RN  Outcome: Progressing

## 2025-02-11 NOTE — PROGRESS NOTES
2/11/2025 1:29 PM  Sallie Gao  [id number]    Subjective:    Awake and alert  Says she can't keep anything down   Having nausea    present  Kearney with pink tinged urine in tubing     Review of Systems  Constitutional: No fever or chills   Respiratory: negative for cough and hemoptysis  Cardiovascular: negative for chest pain and dyspnea  Gastrointestinal: Positive for abdominal pain  : See above  Derm: negative for rash and skin lesion(s)  Neurological: negative for seizures and tremors  Musculoskeletal: Negative    Psychiatric: Negative   All other reviews are negative      Scheduled Meds:   acetaminophen  650 mg Oral 4 times per day    sodium chloride flush  5-40 mL IntraVENous 2 times per day    sodium chloride flush  5-40 mL IntraVENous 2 times per day    enoxaparin  40 mg SubCUTAneous Daily    insulin lispro  0-4 Units SubCUTAneous 4x Daily AC & HS    scopolamine  1 patch TransDERmal Q72H    naloxegol  12.5 mg Oral Daily       Objective:  Vitals:    02/11/25 1145   BP: (!) 166/76   Pulse: 77   Resp: 16   Temp: 97.5 °F (36.4 °C)   SpO2: 98%         Allergies: Metformin, Statins, and Tirzepatide    General Appearance: alert and oriented to person, place and time and in no acute distress  Skin: no rash or erythema  Head: normocephalic and atraumatic  Pulmonary/Chest: normal air movement, no respiratory distress  Abdomen: soft, non-tender, non-distended  Genitourinary: Kearney with pink tinged urine in tubing   Extremities: no cyanosis, clubbing or edema         Labs:     Recent Labs     02/11/25  0502      K 4.1      CO2 27   BUN 13   CREATININE 0.6   GLUCOSE 119*   CALCIUM 9.0       Lab Results   Component Value Date/Time    HGB 11.2 02/11/2025 05:02 AM    HCT 36.1 02/11/2025 05:02 AM       No results found for: \"PSA\"      Assessment/Plan:  POD #4--laparoscopic colectomy, laparoscopic cystotomy repair, cystoscopy, bilateral ureteral catheter insertion/removal  Bladder injury during colon  surgery     Continue the reyes   Irrigate PRN hematuria   She will need cystogram in about 7 days  She states this is scheduled at [facility] on 2/17  She will be discharged home with the reyes catheter  Will follow up in our office after cystogram is performed to determine removal of reyes if no extravasation on cystogram  Hold on additional  Interventions  Please call urology with any worsening hematuria       BRONWYN Lees - CNP   [facility]NA  Urology

## 2025-02-11 NOTE — PROGRESS NOTES
Physical Therapy  Facility/Department: [address] ORTHO SURGERY  Physical Therapy Treatment Note    Name: Sallie Gao  : 1955  MRN: [id number]  Date of Service: 2025        Patient Diagnosis(es): The encounter diagnosis was Diverticulitis of colon with perforation.  Past Medical History:  has a past medical history of Arthritis, Diverticulitis, Embolism (HCC), Hx of blood clots, SHANTANU (obstructive sleep apnea), Right foot pain, and Type 2 diabetes mellitus without complication (HCC).  Past Surgical History:  has a past surgical history that includes back surgery (); Hysterectomy (); Inguinal hernia repair (, ); Tonsillectomy (); Nose surgery (); eye surgery; Cataract removal; Nerve Block (Left, 2019); Anesthesia Nerve Block (Left, 2019); Nerve Block (Left, 2019); Anesthesia Nerve Block (Left, 2019); Nerve Block (Left, 2019); epidural steroid injection (Left, 2019); vitrectomy (Right, 2024); Medication Injection (Right, 2024); colectomy (N/A, 2025); and Cystoscopy (Bilateral, 2025).      Evaluating Therapist: Vonda Malcolm PT    Room #:  0706/0706-A  Diagnosis:  Diverticulitis of colon with perforation [K57.20]  Perforated diverticulum of large intestine [K57.20]  PMHx/PSHx:  DM, Arthritis  Procedure/Surgery:   LAPAROSCIOPIC, HAND ASSIST, PARTIAL COLECTOMY WITH PRIMARY ANASTAMOSIS AND BLADDER REPAIR WITH TAP BLOCK  LAPAROSCOPIC CYSTOTOMY REPAIR, CYSTOSCOPY, URETERAL CATHETER INSERTION AND REMOVAL, HICKS INSERTION  Precautions:  falls, O2, PCA pump, CBI      Social:  Pt lives with  in a 2 floor plan 7 steps and 1 rails to enter. Has half bath first floor  Prior to admission independent all areas without device     Initial Evaluation  Date: 25 Treatment  2025    Short Term/ Long Term   Goals   Was pt agreeable to Eval/treatment? yes yes    Does pt have pain? Around ribs Abdominal pain    Bed Mobility  Rolling: min

## 2025-02-11 NOTE — PROGRESS NOTES
Occupational Therapy  OT BEDSIDE TREATMENT NOTE      Date:2025  Patient Name: Sallie Gao  MRN: [id number]  : 1955  Room: [address] [place], [place] [address]       Evaluating OT: Georgie Hogan OTR/L - [id number]     Referring Provider: Sonia Chin DO   Specific Provider Orders/Date: \"OT eval and treat\" - 2025     Diagnosis: Diverticulitis of colon with perforation [K57.20]  Perforated diverticulum of large intestine [K57.20]    Surgery: Patient underwent laparoscopic partial colectomy with bladder repair on 2025.  Pertinent Medical History: arthritis, SHANTANU, DM, back surgery      Precautions: fall risk     Assessment of Current Deficits:    [x] Functional mobility             [x]ADLs           [x] Strength                  []Cognition   [x] Functional transfers           [x] IADLs         [x] Safety Awareness   [x]Endurance   [] Fine Coordination              [x] Balance      [] Vision/perception   []Sensation     []Gross Motor Coordination  [] ROM           [] Delirium                   [] Motor Control      OT PLAN OF CARE   OT POC is based on physician orders, patient diagnosis, and results of clinical assessment.  Frequency/Duration 2-5 days/week for 2 weeks PRN   Specific OT Treatment Interventions to Include:   * Instruction/training on adapted ADL techniques and AE recommendations to increase functional independence within precautions       * Training on energy conservation strategies, correct breathing pattern and techniques to improve independence/tolerance for self-care routine  * Functional transfer/mobility training/DME recommendations for increased independence, safety, and fall prevention  * Patient/Family education to increase follow through with safety techniques and functional independence  * Recommendation of environmental modifications for increased safety with functional transfers/mobility and ADLs  * Therapeutic exercise to improve motor endurance, ROM, and functional strength for  ADLs/functional transfers  * Therapeutic activities to facilitate/challenge dynamic balance, stand tolerance for increased safety and independence with ADLs  * Therapeutic activities to facilitate gross/fine motor skills for increased independence with ADLs  * Neuro-muscular re-education: facilitation of righting/equilibrium reactions, midline orientation, scapular stability/mobility, normalization of muscle tone, and facilitation of volitional active controled movement     Recommended Adaptive Equipment: TBD      Home Living: Patient lives with her  in a 2 floor home with 7 steps and 1 rail to enter. Patient's bedroom and bathroom are on the 2nd floor, 1/2 bath on the first floor.  Bathroom Setup: tub/shower  Equipment Owned: none     Prior Level of Function (PLOF): Patient reports she was independent with ADLs, independent with IADLs, and independent with functional mobility (no AD) prior to this hospitalization.  Driving: yes     Pain Level: Pt did not report abdominal pain however complaint of nausea.    Cognition: Pt awake and alert.      Functional Assessment:                  AM-PAC Daily Activity Raw Score: 19/24    Initial Eval Status  Date: 2/8/2025 Treatment Status  Date: 2/11/25 Short Term Goals = Long Term Goals   Feeding Independent for ice chips Independent   Liquid diet.       Grooming SBA  independent while standing at the sink.  independent   UB Dressing setup  setup  independent    LB Dressing MIN A  min A to don slipper socks.  independent - with use of AE, as needed/appropriate   Bathing MIN A   independent - with use of AE/DME, as needed/appropriate   Toileting Kearney catheter and CBI, would require MIN A for toilet transfer  catheter.    SBA toilet transfer.  Mod I   Bed Mobility  Supine-to-Sit: MIN A  Sit-to-Supine: not assessed   independent supine to sit  independent in order to maximize patient's independence with ADLs and functional tasks.   Functional Transfers Sit-to-Stand: MIN A

## 2025-02-11 NOTE — PROGRESS NOTES
GENERAL SURGERY  DAILY PROGRESS NOTE    Patient's Name/Date of Birth: Sallie Gao / 1955    Date: 2025     No chief complaint on file.       Subjective:  Feeling good this morning. Has done well with intermittent prn dosing.       Objective:  Last 24Hrs  Temp  Av.6 °F (36.4 °C)  Min: 97.5 °F (36.4 °C)  Max: 97.9 °F (36.6 °C)  Resp  Av.5  Min: 16  Max: 18  Pulse  Av.3  Min: 70  Max: 77  Systolic (24hrs), Av , Min:140 , Max:182     Diastolic (24hrs), Av, Min:75, Max:88    SpO2  Av %  Min: 97 %  Max: 99 %    I/O last 3 completed shifts:  In: 2276.1 [I.V.:1856.1; Other:420]  Out: 1885 [Urine:1785; Emesis/NG output:100]      General: resting in bed  Cardiovascular: Warm throughout, no edema  Respiratory: no respiratory distress, equal chest rise on RA  Abdomen: soft, appropriate periincisional tenderness to palpation, nondistended. Incisions C/D/I  Skin: no obvious rashes or lesions appreciated, no jaundice  Extremities: moving all extremities      CBC  Recent Labs     25  0305 02/10/25  0705 25  0502   WBC 8.7 10.0 8.1   RBC 4.05 4.60 4.13   HGB 10.9* 12.4 11.2*   HCT 36.5 40.6 36.1   MCV 90.1 88.3 87.4   MCH 26.9 27.0 27.1   MCHC 29.9* 30.5* 31.0*   RDW 14.6 14.0 14.1    236 275   MPV 11.0 11.3 11.4       CMP  Recent Labs     25  0305 02/10/25  0705 25  0502    137 140   K 4.5 4.3 4.1    99 104   CO2 27 23 27   BUN 21 16 13   CREATININE 0.7 0.6 0.6   GLUCOSE 121* 173* 119*   CALCIUM 8.8 9.1 9.0         Assessment/Plan:    Patient Active Problem List   Diagnosis    Type 2 diabetes mellitus without complication (HCC)    Primary insomnia    Sleep disorder    Gastroesophageal reflux disease without esophagitis    Lumbar pain with radiation down left leg    Neural foraminal stenosis of lumbar spine    Radiculopathy, lumbosacral region    AMS (altered mental status)    Dizziness    Synovitis and tenosynovitis    Diverticulitis

## 2025-02-11 NOTE — PROGRESS NOTES
[facility] Quality Flow/Interdisciplinary Rounds Progress Note        Quality Flow Rounds held on February 11, 2025    Disciplines Attending:  Bedside Nurse, , , Nursing Unit Leadership, and      Sallie Deweymeghan was admitted on 2/7/2025 10:06 AM    Anticipated Discharge Date:  Expected Discharge Date: 02/12/25    [facility]     Mitesh Score:  Mitesh Scale Score: 19    [facility] RISK OF UNPLANNED READMISSION 2.0             7 Total Score        Discussed patient goal for the day, patient clinical progression, and barriers to discharge.  The following Goal(s) of the Day/Commitment(s) have been identified:  Urinary Catheter - Evaluate for Removal/Change   Kearney catheter is to remain in place until POD#10 (2/17) for outpatient removal and cystogram       Kathy Jameson RN  February 11, 2025

## 2025-02-11 NOTE — PROGRESS NOTES
GENERAL SURGERY  DAILY PROGRESS NOTE    Patient's Name/Date of Birth: Sallie Gao / 1955    Date: 2025     No chief complaint on file.       Subjective:  Feeling good this morning.  Tolerating p.o. intake, but had some dry heaves overnight.  Passing gas but no significant bowel movement.  Pain adequately controlled.  Denies other acute complaints.      Objective:  Last 24Hrs  Temp  Av.6 °F (36.4 °C)  Min: 97.5 °F (36.4 °C)  Max: 97.9 °F (36.6 °C)  Resp  Av.5  Min: 16  Max: 18  Pulse  Av.3  Min: 70  Max: 77  Systolic (24hrs), Av , Min:140 , Max:182     Diastolic (24hrs), Av, Min:75, Max:88    SpO2  Av %  Min: 97 %  Max: 99 %    I/O last 3 completed shifts:  In: 2276.1 [I.V.:1856.1; Other:420]  Out: 1885 [Urine:1785; Emesis/NG output:100]      General: resting in bed  Cardiovascular: Warm throughout, no edema  Respiratory: no respiratory distress, equal chest rise on RA  Abdomen: soft, appropriate periincisional tenderness to palpation, nondistended. Incisions C/D/I  Skin: no obvious rashes or lesions appreciated, no jaundice  Extremities: moving all extremities      CBC  Recent Labs     25  0305 02/10/25  0705 25  0502   WBC 8.7 10.0 8.1   RBC 4.05 4.60 4.13   HGB 10.9* 12.4 11.2*   HCT 36.5 40.6 36.1   MCV 90.1 88.3 87.4   MCH 26.9 27.0 27.1   MCHC 29.9* 30.5* 31.0*   RDW 14.6 14.0 14.1    236 275   MPV 11.0 11.3 11.4       CMP  Recent Labs     25  0305 02/10/25  0705 25  0502    137 140   K 4.5 4.3 4.1    99 104   CO2 27 23 27   BUN 21 16 13   CREATININE 0.7 0.6 0.6   GLUCOSE 121* 173* 119*   CALCIUM 8.8 9.1 9.0         Assessment/Plan:    Patient Active Problem List   Diagnosis    Type 2 diabetes mellitus without complication (HCC)    Primary insomnia    Sleep disorder    Gastroesophageal reflux disease without esophagitis    Lumbar pain with radiation down left leg    Neural foraminal stenosis of lumbar spine     Past Surgical History:   Procedure Laterality Date    APPENDECTOMY      [address]      CARPAL TUNNEL RELEASE   right     SECTION  x3    COLONOSCOPY      ENDOSCOPY, COLON, DIAGNOSTIC      HERNIA REPAIR      open umbilical hernia repair.  JOINT REPLACEMENT      right hip    NERVE BLOCK Left 16    hip #1    [place] LAP,CHOLECYSTECTOMY N/A 2018    CHOLECYSTECTOMY LAPAROSCOPIC performed by Shazia Lundy MD at [facility]      UPPER GASTROINTESTINAL ENDOSCOPY  10/28/13    Dr. Osorio Legacy: Kristin Blush reflux,no gastritis, no esophagitis, no hiatal hernia, H Pylori negative    VARICOSE VEIN SURGERY       Social History     Social History    Marital status:      Spouse name: N/A    Number of children: N/A    Years of education: N/A     Social History Main Topics    Smoking status: Former Smoker     Packs/day: 1.00     Years: 32.00     Quit date: 10/4/2008    Smokeless tobacco: Never Used    Alcohol use No    Drug use: No    Sexual activity: No     Other Topics Concern    None     Social History Narrative    None       Family History   Problem Relation Age of Onset    Heart Disease Mother     Heart Disease Father     Cancer Brother        Current Outpatient Prescriptions   Medication Sig Dispense Refill    clotrimazole-betamethasone (LOTRISONE) 1-0.05 % cream apply to affected area twice daily  5    ibuprofen (ADVIL;MOTRIN) 800 MG tablet Take 1 tablet by mouth every 6 hours as needed for Pain 20 tablet 0    predniSONE (DELTASONE) 20 MG tablet Take 20 mg by mouth daily      levofloxacin (LEVAQUIN) 500 MG tablet Take 500 mg by mouth daily      budesonide-formoterol (SYMBICORT) 80-4.5 MCG/ACT AERO Inhale 2 puffs into the lungs 2 times daily as needed      zolpidem (AMBIEN) 5 MG tablet Take 5 mg by mouth nightly as needed for Sleep. Bret Catena bumetanide (BUMEX) 1 MG tablet Take 1 mg by mouth daily Radiculopathy, lumbosacral region    AMS (altered mental status)    Dizziness    Synovitis and tenosynovitis    Diverticulitis of large intestine with abscess without bleeding    Diverticulitis    Perforated diverticulum of large intestine       69 y.o. female s/p laparoscopic hand-assisted sigmoid colectomy with primary anastomosis and bladder repair 2/7     - Continue clear liquid diet until return of bowel function   - manage pain as needed -continue as needed Oxy and Dilaudid   - continue SSI, monitor glucose with goal <180   - continue reyes catheter, urology following and recommend flushing as needed   - lovenox for DVT ppx   - monitor electrolytes and replace as needed   - encourage up and out of bed as able      Virgilio King MD  General Surgery Resident, PGY-2    Electronically signed on 2/11/2025 at 2:28 PM        no

## 2025-02-12 LAB
ANION GAP SERPL CALCULATED.3IONS-SCNC: 16 MMOL/L (ref 7–16)
BASOPHILS # BLD: 0.03 K/UL (ref 0–0.2)
BASOPHILS NFR BLD: 0 % (ref 0–2)
BUN SERPL-MCNC: 11 MG/DL (ref 6–23)
CALCIUM SERPL-MCNC: 9.2 MG/DL (ref 8.6–10.2)
CHLORIDE SERPL-SCNC: 93 MMOL/L (ref 98–107)
CO2 SERPL-SCNC: 25 MMOL/L (ref 22–29)
CREAT SERPL-MCNC: 0.5 MG/DL (ref 0.5–1)
EOSINOPHIL # BLD: 0.07 K/UL (ref 0.05–0.5)
EOSINOPHILS RELATIVE PERCENT: 1 % (ref 0–6)
ERYTHROCYTE [DISTWIDTH] IN BLOOD BY AUTOMATED COUNT: 14.3 % (ref 11.5–15)
GFR, ESTIMATED: >90 ML/MIN/1.73M2
GLUCOSE BLD-MCNC: 135 MG/DL (ref 74–99)
GLUCOSE BLD-MCNC: 139 MG/DL (ref 74–99)
GLUCOSE BLD-MCNC: 157 MG/DL (ref 74–99)
GLUCOSE BLD-MCNC: 183 MG/DL (ref 74–99)
GLUCOSE SERPL-MCNC: 120 MG/DL (ref 74–99)
HCT VFR BLD AUTO: 39.4 % (ref 34–48)
HGB BLD-MCNC: 12.6 G/DL (ref 11.5–15.5)
IMM GRANULOCYTES # BLD AUTO: 0.07 K/UL (ref 0–0.58)
IMM GRANULOCYTES NFR BLD: 1 % (ref 0–5)
LYMPHOCYTES NFR BLD: 1.16 K/UL (ref 1.5–4)
LYMPHOCYTES RELATIVE PERCENT: 15 % (ref 20–42)
MAGNESIUM SERPL-MCNC: 1.8 MG/DL (ref 1.6–2.6)
MCH RBC QN AUTO: 27.7 PG (ref 26–35)
MCHC RBC AUTO-ENTMCNC: 32 G/DL (ref 32–34.5)
MCV RBC AUTO: 86.6 FL (ref 80–99.9)
MONOCYTES NFR BLD: 0.8 K/UL (ref 0.1–0.95)
MONOCYTES NFR BLD: 11 % (ref 2–12)
NEUTROPHILS NFR BLD: 72 % (ref 43–80)
NEUTS SEG NFR BLD: 5.44 K/UL (ref 1.8–7.3)
PLATELET # BLD AUTO: 330 K/UL (ref 130–450)
PMV BLD AUTO: 11.2 FL (ref 7–12)
POTASSIUM SERPL-SCNC: 3.5 MMOL/L (ref 3.5–5)
RBC # BLD AUTO: 4.55 M/UL (ref 3.5–5.5)
SODIUM SERPL-SCNC: 134 MMOL/L (ref 132–146)
SURGICAL PATHOLOGY REPORT: NORMAL
WBC OTHER # BLD: 7.6 K/UL (ref 4.5–11.5)

## 2025-02-12 PROCEDURE — 6370000000 HC RX 637 (ALT 250 FOR IP)

## 2025-02-12 PROCEDURE — 2580000003 HC RX 258: Performed by: STUDENT IN AN ORGANIZED HEALTH CARE EDUCATION/TRAINING PROGRAM

## 2025-02-12 PROCEDURE — 6360000002 HC RX W HCPCS

## 2025-02-12 PROCEDURE — 82962 GLUCOSE BLOOD TEST: CPT

## 2025-02-12 PROCEDURE — 2500000003 HC RX 250 WO HCPCS

## 2025-02-12 PROCEDURE — 2580000003 HC RX 258

## 2025-02-12 PROCEDURE — 6370000000 HC RX 637 (ALT 250 FOR IP): Performed by: INTERNAL MEDICINE

## 2025-02-12 PROCEDURE — 36415 COLL VENOUS BLD VENIPUNCTURE: CPT

## 2025-02-12 PROCEDURE — 1200000000 HC SEMI PRIVATE

## 2025-02-12 PROCEDURE — 85025 COMPLETE CBC W/AUTO DIFF WBC: CPT

## 2025-02-12 PROCEDURE — 51700 IRRIGATION OF BLADDER: CPT

## 2025-02-12 PROCEDURE — 80048 BASIC METABOLIC PNL TOTAL CA: CPT

## 2025-02-12 PROCEDURE — 83735 ASSAY OF MAGNESIUM: CPT

## 2025-02-12 PROCEDURE — 99232 SBSQ HOSP IP/OBS MODERATE 35: CPT | Performed by: INTERNAL MEDICINE

## 2025-02-12 RX ORDER — POTASSIUM CHLORIDE 1500 MG/1
40 TABLET, EXTENDED RELEASE ORAL ONCE
Status: COMPLETED | OUTPATIENT
Start: 2025-02-12 | End: 2025-02-12

## 2025-02-12 RX ORDER — DEXTROSE MONOHYDRATE, SODIUM CHLORIDE, AND POTASSIUM CHLORIDE 50; 2.98; 4.5 G/1000ML; G/1000ML; G/1000ML
INJECTION, SOLUTION INTRAVENOUS CONTINUOUS
Status: DISCONTINUED | OUTPATIENT
Start: 2025-02-12 | End: 2025-02-13

## 2025-02-12 RX ORDER — METOPROLOL TARTRATE 25 MG/1
25 TABLET, FILM COATED ORAL 2 TIMES DAILY
Status: DISCONTINUED | OUTPATIENT
Start: 2025-02-12 | End: 2025-02-13

## 2025-02-12 RX ORDER — ACETAMINOPHEN 325 MG/1
650 TABLET ORAL EVERY 6 HOURS SCHEDULED
Status: DISCONTINUED | OUTPATIENT
Start: 2025-02-12 | End: 2025-02-14 | Stop reason: HOSPADM

## 2025-02-12 RX ORDER — POLYETHYLENE GLYCOL 3350 17 G/17G
17 POWDER, FOR SOLUTION ORAL DAILY
Status: DISCONTINUED | OUTPATIENT
Start: 2025-02-12 | End: 2025-02-14 | Stop reason: HOSPADM

## 2025-02-12 RX ORDER — 0.9 % SODIUM CHLORIDE 0.9 %
500 INTRAVENOUS SOLUTION INTRAVENOUS ONCE
Status: COMPLETED | OUTPATIENT
Start: 2025-02-12 | End: 2025-02-12

## 2025-02-12 RX ORDER — MAGNESIUM SULFATE IN WATER 40 MG/ML
2000 INJECTION, SOLUTION INTRAVENOUS ONCE
Status: COMPLETED | OUTPATIENT
Start: 2025-02-12 | End: 2025-02-12

## 2025-02-12 RX ADMIN — INSULIN LISPRO 1 UNITS: 100 INJECTION, SOLUTION INTRAVENOUS; SUBCUTANEOUS at 21:08

## 2025-02-12 RX ADMIN — SODIUM CHLORIDE, POTASSIUM CHLORIDE, SODIUM LACTATE AND CALCIUM CHLORIDE: 600; 310; 30; 20 INJECTION, SOLUTION INTRAVENOUS at 04:06

## 2025-02-12 RX ADMIN — POLYETHYLENE GLYCOL 3350 17 G: 17 POWDER, FOR SOLUTION ORAL at 09:29

## 2025-02-12 RX ADMIN — MAGNESIUM SULFATE HEPTAHYDRATE 2000 MG: 40 INJECTION, SOLUTION INTRAVENOUS at 13:48

## 2025-02-12 RX ADMIN — ONDANSETRON 4 MG: 2 INJECTION, SOLUTION INTRAMUSCULAR; INTRAVENOUS at 09:32

## 2025-02-12 RX ADMIN — POTASSIUM CHLORIDE 40 MEQ: 1500 TABLET, EXTENDED RELEASE ORAL at 13:50

## 2025-02-12 RX ADMIN — LABETALOL HYDROCHLORIDE 10 MG: 5 INJECTION INTRAVENOUS at 09:30

## 2025-02-12 RX ADMIN — ACETAMINOPHEN 650 MG: 325 TABLET ORAL at 06:20

## 2025-02-12 RX ADMIN — POTASSIUM CHLORIDE, DEXTROSE MONOHYDRATE AND SODIUM CHLORIDE: 300; 5; 450 INJECTION, SOLUTION INTRAVENOUS at 13:42

## 2025-02-12 RX ADMIN — ENOXAPARIN SODIUM 40 MG: 100 INJECTION SUBCUTANEOUS at 09:30

## 2025-02-12 RX ADMIN — ACETAMINOPHEN 650 MG: 325 TABLET ORAL at 13:50

## 2025-02-12 RX ADMIN — HYDRALAZINE HYDROCHLORIDE 10 MG: 20 INJECTION, SOLUTION INTRAMUSCULAR; INTRAVENOUS at 13:48

## 2025-02-12 RX ADMIN — SODIUM CHLORIDE 500 ML: 9 INJECTION, SOLUTION INTRAVENOUS at 10:13

## 2025-02-12 RX ADMIN — METOPROLOL TARTRATE 25 MG: 25 TABLET, FILM COATED ORAL at 20:57

## 2025-02-12 RX ADMIN — ZOLPIDEM TARTRATE 5 MG: 5 TABLET ORAL at 22:33

## 2025-02-12 RX ADMIN — METOPROLOL TARTRATE 25 MG: 25 TABLET, FILM COATED ORAL at 13:53

## 2025-02-12 RX ADMIN — ACETAMINOPHEN 650 MG: 325 TABLET ORAL at 18:39

## 2025-02-12 RX ADMIN — SODIUM CHLORIDE, PRESERVATIVE FREE 10 ML: 5 INJECTION INTRAVENOUS at 09:34

## 2025-02-12 ASSESSMENT — PAIN SCALES - GENERAL
PAINLEVEL_OUTOF10: 0

## 2025-02-12 NOTE — PROGRESS NOTES
Dr. Curtis notified of new consult and glucose 561/K+ 5.8  via phone conversation.  MD to enter orders.

## 2025-02-12 NOTE — PROGRESS NOTES
Updated Dr King to patients elevated BP's today. Updated that Pt has had 2 doses of PRN Apresoline this shift. Dr stated that he will review patients chart in the morning and not to continue dosing with apresoline this evening as long as pt is asymptomatic.

## 2025-02-12 NOTE — CONSULTS
[facility]   Consult for Medical Management      Reason for Consult:  Medical management    History of Present Illness:  69 y.o. female with a history of DM type II, history of PE, SHANTANU not on CPAP, diverticulitis, arthritis presents with diverticulitis of colon with perforation s/p laparoscopic sigmoid colectomy 2/7 with Dr. King.  Surgery was complicated with cystotomy s/p laparoscopic cystotomy repair, cystoscopy and bilateral ureteral catheter insertion/removal on 2/7 with Dr. Lima. CBI was initiated by urology, has since been stopped.  Consulted for medical management of hypertension.    Informant(s) for H&P: Patient and EMR    REVIEW OF SYSTEMS:  Complete ROS performed with patient, pertinent positives and negatives are listed in the HPI.    PMH:  Past Medical History:   Diagnosis Date    Arthritis     Diverticulitis     Embolism (HCC) 2003    pulmonary post op    Hx of blood clots     SHANTANU (obstructive sleep apnea)     Unable to tolerate CPAP    Right foot pain     Type 2 diabetes mellitus without complication (HCC) 04/27/2016       Surgical History:  Past Surgical History:   Procedure Laterality Date    ANESTHESIA NERVE BLOCK Left 03/07/2019    LEFT L4-5 TRANSFORAMINAL EPIDURAL STEROID INJECTION performed by Zohreh Cazares DO at [facility] OR    ANESTHESIA NERVE BLOCK Left 03/28/2019    LEFT L4-5 TRANSFORAMINAL EPIDURAL STEROID INJECTION performed by Zohreh Cazares DO at [facility] OR    BACK SURGERY  2003    Lumbar, laminectomy, Dr. Ross    CATARACT REMOVAL      COLECTOMY N/A 2/7/2025    LAPAROSCIOPIC, HAND ASSIST, PARTIAL COLECTOMY WITH PRIMARY ANASTAMOSIS AND BLADDER REPAIR WITH TAP BLOCK performed by Virgilio King MD at [facility] OR    CYSTOSCOPY Bilateral 2/7/2025    LAPAROSCOPIC CYSTOTOMY REPAIR, CYSTOSCOPY, URETERAL CATHETER INSERTION AND REMOVAL, HICKS INSERTION performed by Raulito Lima MD at [facility] OR

## 2025-02-12 NOTE — PROGRESS NOTES
GENERAL SURGERY  DAILY PROGRESS NOTE    Patient's Name/Date of Birth: Sallie Gao / 1955    Date: 2025     No chief complaint on file.       Subjective:  Feeling okay this morning. Had some nausea which appears to be due to coughing up phlegm. Having flatus. No bowel movement yet.       Objective:  Last 24Hrs  Temp  Av.6 °F (36.4 °C)  Min: 97.5 °F (36.4 °C)  Max: 97.7 °F (36.5 °C)  Resp  Av.3  Min: 16  Max: 18  Pulse  Av.7  Min: 71  Max: 90  Systolic (24hrs), Av , Min:163 , Max:182     Diastolic (24hrs), Av, Min:75, Max:88    SpO2  Av.2 %  Min: 96 %  Max: 99 %    I/O last 3 completed shifts:  In: 2276.1 [I.V.:1856.1; Other:420]  Out: 3735 [Urine:3635; Emesis/NG output:100]      General: resting in bed  Cardiovascular: Warm throughout, no edema  Respiratory: no respiratory distress, equal chest rise on RA  Abdomen: soft, appropriate periincisional tenderness to palpation, nondistended. Incisions C/D/I  Skin: no obvious rashes or lesions appreciated, no jaundice  Extremities: moving all extremities      CBC  Recent Labs     02/10/25  0705 25  0502   WBC 10.0 8.1   RBC 4.60 4.13   HGB 12.4 11.2*   HCT 40.6 36.1   MCV 88.3 87.4   MCH 27.0 27.1   MCHC 30.5* 31.0*   RDW 14.0 14.1    275   MPV 11.3 11.4       CMP  Recent Labs     02/10/25  0705 25  0502    140   K 4.3 4.1   CL 99 104   CO2 23 27   BUN 16 13   CREATININE 0.6 0.6   GLUCOSE 173* 119*   CALCIUM 9.1 9.0         Assessment/Plan:    Patient Active Problem List   Diagnosis    Type 2 diabetes mellitus without complication (HCC)    Primary insomnia    Sleep disorder    Gastroesophageal reflux disease without esophagitis    Lumbar pain with radiation down left leg    Neural foraminal stenosis of lumbar spine    Radiculopathy, lumbosacral region    AMS (altered mental status)    Dizziness    Synovitis and tenosynovitis    Diverticulitis of large intestine with abscess without bleeding

## 2025-02-12 NOTE — PROGRESS NOTES
[facility] Quality Flow/Interdisciplinary Rounds Progress Note        Quality Flow Rounds held on February 12, 2025    Disciplines Attending:  Bedside Nurse, , , Nursing Unit Leadership, and      Sallie BOYCE Sima was admitted on 2/7/2025 10:06 AM    Anticipated Discharge Date:  Expected Discharge Date: 02/12/25    Disposition: Home with home health    Mitesh Score:  Mitesh Scale Score: 19    [facility] RISK OF UNPLANNED READMISSION 2.0             7 Total Score        Discussed patient goal for the day, patient clinical progression, and barriers to discharge.  The following Goal(s) of the Day/Commitment(s) have been identified:  RN message out to Dr. Humphrey for medical management consult       Kathy Jameson RN  February 12, 2025

## 2025-02-12 NOTE — CARE COORDINATION
CASE MANAGEMENT..... Chart reviewed. POD 5 partial colectomy and bladder repair. Kearney cath placed and will remain intact at dc. Remains on clear liquids per sx. + flatus/no bm-start glycolax. HTN new this admit-Internal Med consulted to assist. Discharge plan is home with [facility]. Orders in place. Will follow.

## 2025-02-12 NOTE — PROGRESS NOTES
CComprehensive Nutrition Assessment    Type and Reason for Visit:  Initial, NPO/clear liquid, LOS    Nutrition Recommendations/Plan:   Continue current diet  Monitor for nutrition progression  Start Ensure clear BID  Start Gelatein BID  Continue to monitor while inpatient     Malnutrition Assessment:  Malnutrition Status:  At risk for malnutrition (02/12/25 1309)    Context:  Acute Illness     Findings of the 6 clinical characteristics of malnutrition:  Energy Intake:  50% or less of estimated energy requirements for 5 or more days  Weight Loss:  Unable to assess (no actual wt Hx)     Body Fat Loss:  No body fat loss     Muscle Mass Loss:  No muscle mass loss    Fluid Accumulation:  No fluid accumulation     Strength:  Not Performed    Nutrition Assessment:    Pt w/ hx recurrent diverticulitis admits s/p laproscopic sigmoid colectomy 02/07. Hx of T2DM. Pt has not had a BM since 02/06 or after colectomy. Pt reports ongoing nausea and vomitting during/after eating. Will start Ensure Clear BID and Gelatein BID as discussed w/ pt.    Nutrition Related Findings:    A&O X4, I/O's WDL, no edema, last BM 02/06, abd soft/ no guarding, nausea/vomitting, denies D/C, + flatus, hypoactive BS, nausea, Glucose = 120. Wound Type: Surgical Incision       Current Nutrition Intake & Therapies:    Average Meal Intake: 26-50%  Average Supplements Intake: None Ordered  ADULT DIET; Clear Liquid    Anthropometric Measures:  Height: 165.1 cm (5' 5\")  Ideal Body Weight (IBW): 125 lbs (57 kg)    Admission Body Weight: 66 kg (145 lb 8 oz) (First recorded 02/12)  Current Body Weight: 66 kg (145 lb 8 oz) (02/12), 116.4 % IBW. Weight Source: Bed scale  Current BMI (kg/m2): 24.2  Usual Body Weight: 74.8 kg (165 lb) (09/11/2024; per office visit EMR, no method)     % Weight Change (Calculated): -11.8  Weight Adjustment For: No Adjustment                 BMI Categories: Normal Weight (BMI 22.0 to 24.9) age over 65    Estimated Daily Nutrient

## 2025-02-13 ENCOUNTER — APPOINTMENT (OUTPATIENT)
Dept: GENERAL RADIOLOGY | Age: 70
DRG: 330 | End: 2025-02-13
Attending: STUDENT IN AN ORGANIZED HEALTH CARE EDUCATION/TRAINING PROGRAM
Payer: MEDICARE

## 2025-02-13 LAB
ANION GAP SERPL CALCULATED.3IONS-SCNC: 11 MMOL/L (ref 7–16)
BASOPHILS # BLD: 0.04 K/UL (ref 0–0.2)
BASOPHILS NFR BLD: 1 % (ref 0–2)
BUN SERPL-MCNC: 9 MG/DL (ref 6–23)
CALCIUM SERPL-MCNC: 8.8 MG/DL (ref 8.6–10.2)
CHLORIDE SERPL-SCNC: 102 MMOL/L (ref 98–107)
CO2 SERPL-SCNC: 24 MMOL/L (ref 22–29)
CREAT SERPL-MCNC: 0.6 MG/DL (ref 0.5–1)
EOSINOPHIL # BLD: 0.21 K/UL (ref 0.05–0.5)
EOSINOPHILS RELATIVE PERCENT: 3 % (ref 0–6)
ERYTHROCYTE [DISTWIDTH] IN BLOOD BY AUTOMATED COUNT: 14.2 % (ref 11.5–15)
GFR, ESTIMATED: >90 ML/MIN/1.73M2
GLUCOSE BLD-MCNC: 131 MG/DL (ref 74–99)
GLUCOSE BLD-MCNC: 138 MG/DL (ref 74–99)
GLUCOSE BLD-MCNC: 151 MG/DL (ref 74–99)
GLUCOSE BLD-MCNC: 151 MG/DL (ref 74–99)
GLUCOSE SERPL-MCNC: 187 MG/DL (ref 74–99)
HCT VFR BLD AUTO: 40.6 % (ref 34–48)
HGB BLD-MCNC: 13.1 G/DL (ref 11.5–15.5)
IMM GRANULOCYTES # BLD AUTO: 0.08 K/UL (ref 0–0.58)
IMM GRANULOCYTES NFR BLD: 1 % (ref 0–5)
LYMPHOCYTES NFR BLD: 1.58 K/UL (ref 1.5–4)
LYMPHOCYTES RELATIVE PERCENT: 23 % (ref 20–42)
MCH RBC QN AUTO: 27.6 PG (ref 26–35)
MCHC RBC AUTO-ENTMCNC: 32.3 G/DL (ref 32–34.5)
MCV RBC AUTO: 85.7 FL (ref 80–99.9)
MONOCYTES NFR BLD: 0.89 K/UL (ref 0.1–0.95)
MONOCYTES NFR BLD: 13 % (ref 2–12)
NEUTROPHILS NFR BLD: 59 % (ref 43–80)
NEUTS SEG NFR BLD: 4.11 K/UL (ref 1.8–7.3)
PLATELET # BLD AUTO: 404 K/UL (ref 130–450)
PMV BLD AUTO: 10.6 FL (ref 7–12)
POTASSIUM SERPL-SCNC: 4.1 MMOL/L (ref 3.5–5)
RBC # BLD AUTO: 4.74 M/UL (ref 3.5–5.5)
SODIUM SERPL-SCNC: 137 MMOL/L (ref 132–146)
WBC OTHER # BLD: 6.9 K/UL (ref 4.5–11.5)

## 2025-02-13 PROCEDURE — 6360000002 HC RX W HCPCS

## 2025-02-13 PROCEDURE — 6370000000 HC RX 637 (ALT 250 FOR IP): Performed by: INTERNAL MEDICINE

## 2025-02-13 PROCEDURE — 99231 SBSQ HOSP IP/OBS SF/LOW 25: CPT | Performed by: INTERNAL MEDICINE

## 2025-02-13 PROCEDURE — 80048 BASIC METABOLIC PNL TOTAL CA: CPT

## 2025-02-13 PROCEDURE — 97530 THERAPEUTIC ACTIVITIES: CPT

## 2025-02-13 PROCEDURE — 2500000003 HC RX 250 WO HCPCS

## 2025-02-13 PROCEDURE — 82962 GLUCOSE BLOOD TEST: CPT

## 2025-02-13 PROCEDURE — 6370000000 HC RX 637 (ALT 250 FOR IP)

## 2025-02-13 PROCEDURE — 74018 RADEX ABDOMEN 1 VIEW: CPT

## 2025-02-13 PROCEDURE — 36415 COLL VENOUS BLD VENIPUNCTURE: CPT

## 2025-02-13 PROCEDURE — 85025 COMPLETE CBC W/AUTO DIFF WBC: CPT

## 2025-02-13 PROCEDURE — 1200000000 HC SEMI PRIVATE

## 2025-02-13 RX ORDER — METOPROLOL TARTRATE 50 MG
50 TABLET ORAL 2 TIMES DAILY
Status: DISCONTINUED | OUTPATIENT
Start: 2025-02-13 | End: 2025-02-14 | Stop reason: HOSPADM

## 2025-02-13 RX ADMIN — ACETAMINOPHEN 650 MG: 325 TABLET ORAL at 00:07

## 2025-02-13 RX ADMIN — ACETAMINOPHEN 650 MG: 325 TABLET ORAL at 06:11

## 2025-02-13 RX ADMIN — POTASSIUM CHLORIDE, DEXTROSE MONOHYDRATE AND SODIUM CHLORIDE: 300; 5; 450 INJECTION, SOLUTION INTRAVENOUS at 00:44

## 2025-02-13 RX ADMIN — ACETAMINOPHEN 650 MG: 325 TABLET ORAL at 16:37

## 2025-02-13 RX ADMIN — ZOLPIDEM TARTRATE 5 MG: 5 TABLET ORAL at 22:05

## 2025-02-13 RX ADMIN — HYDRALAZINE HYDROCHLORIDE 10 MG: 20 INJECTION, SOLUTION INTRAMUSCULAR; INTRAVENOUS at 00:28

## 2025-02-13 RX ADMIN — ENOXAPARIN SODIUM 40 MG: 100 INJECTION SUBCUTANEOUS at 08:56

## 2025-02-13 RX ADMIN — METOPROLOL TARTRATE 50 MG: 50 TABLET, FILM COATED ORAL at 08:56

## 2025-02-13 RX ADMIN — METOPROLOL TARTRATE 50 MG: 50 TABLET, FILM COATED ORAL at 21:00

## 2025-02-13 RX ADMIN — POLYETHYLENE GLYCOL 3350 17 G: 17 POWDER, FOR SOLUTION ORAL at 08:57

## 2025-02-13 RX ADMIN — SODIUM CHLORIDE, PRESERVATIVE FREE 10 ML: 5 INJECTION INTRAVENOUS at 21:04

## 2025-02-13 RX ADMIN — ACETAMINOPHEN 650 MG: 325 TABLET ORAL at 11:26

## 2025-02-13 RX ADMIN — POTASSIUM CHLORIDE, DEXTROSE MONOHYDRATE AND SODIUM CHLORIDE: 300; 5; 450 INJECTION, SOLUTION INTRAVENOUS at 10:52

## 2025-02-13 ASSESSMENT — PAIN DESCRIPTION - DESCRIPTORS
DESCRIPTORS: ACHING
DESCRIPTORS: DISCOMFORT
DESCRIPTORS: ACHING
DESCRIPTORS: ACHING

## 2025-02-13 ASSESSMENT — PAIN SCALES - GENERAL
PAINLEVEL_OUTOF10: 1
PAINLEVEL_OUTOF10: 2
PAINLEVEL_OUTOF10: 1
PAINLEVEL_OUTOF10: 1
PAINLEVEL_OUTOF10: 2

## 2025-02-13 ASSESSMENT — PAIN DESCRIPTION - ORIENTATION
ORIENTATION: LOWER
ORIENTATION: LOWER

## 2025-02-13 ASSESSMENT — PAIN DESCRIPTION - LOCATION
LOCATION: ABDOMEN

## 2025-02-13 NOTE — CARE COORDINATION
CASE MANAGEMENT....Chart reviewed and met with patient at the bedside. POD 6 partial colectomy and bladder repair. Kearney cath placed and will remain intact at dc. Advanced to full liquid diet today. Internal Med on consult for new HTN. Continue to monitor labs/vitals and treat accordingly. Discharge plan is home with [facility]. Orders in place. Anticipate dc soon-Jo with [facility] updated. Will follow.

## 2025-02-13 NOTE — PROGRESS NOTES
Physical Therapy  Facility/Department: [address] ORTHO SURGERY  Daily Treatment Note  NAME: Sallie Gao  : 1955  MRN: [id number]    Date of Service: 2025      Patient Diagnosis(es): The encounter diagnosis was Diverticulitis of colon with perforation.  Past Medical History:  has a past medical history of Arthritis, Diverticulitis, Embolism (HCC), Hx of blood clots, SHANTANU (obstructive sleep apnea), Right foot pain, and Type 2 diabetes mellitus without complication (HCC).  Past Surgical History:  has a past surgical history that includes back surgery (); Hysterectomy (); Inguinal hernia repair (, ); Tonsillectomy (); Nose surgery (); eye surgery; Cataract removal; Nerve Block (Left, 2019); Anesthesia Nerve Block (Left, 2019); Nerve Block (Left, 2019); Anesthesia Nerve Block (Left, 2019); Nerve Block (Left, 2019); epidural steroid injection (Left, 2019); vitrectomy (Right, 2024); Medication Injection (Right, 2024); colectomy (N/A, 2025); and Cystoscopy (Bilateral, 2025).        Evaluating Therapist: Vonda Malcolm PT     Room #:  0706/0706-A  Diagnosis:  Diverticulitis of colon with perforation [K57.20]  Perforated diverticulum of large intestine [K57.20]  PMHx/PSHx:  DM, Arthritis  Procedure/Surgery:   LAPAROSCIOPIC, HAND ASSIST, PARTIAL COLECTOMY WITH PRIMARY ANASTAMOSIS AND BLADDER REPAIR WITH TAP BLOCK  LAPAROSCOPIC CYSTOTOMY REPAIR, CYSTOSCOPY, URETERAL CATHETER INSERTION AND REMOVAL, HICKS INSERTION  Precautions:  falls, O2, PCA pump, CBI        Social:  Pt lives with  in a 2 floor plan 7 steps and 1 rails to enter. Has half bath first floor  Prior to admission independent all areas without device       Initial Evaluation  Date: 25 Treatment  2025    Short Term/ Long Term   Goals   Was pt agreeable to Eval/treatment? yes yes     Does pt have pain? Around ribs Abdominal pain     Bed Mobility  Rolling: min

## 2025-02-13 NOTE — PROGRESS NOTES
Progress  Note  No chief complaint on file.    Historical Issues:  Current Facility-Administered Medications   Medication Dose Route Frequency Provider Last Rate Last Admin    metoprolol tartrate (LOPRESSOR) tablet 50 mg  50 mg Oral BID Ayad Humphrey MD   50 mg at 02/13/25 0856    polyethylene glycol (GLYCOLAX) packet 17 g  17 g Oral Daily Keyonna Oakes DO   17 g at 02/13/25 0857    acetaminophen (TYLENOL) tablet 650 mg  650 mg Oral 4 times per day Ayad Humphrey MD   650 mg at 02/13/25 1126    HYDROmorphone (DILAUDID) injection 0.5 mg  0.5 mg IntraVENous Q4H PRN Keyonna Oakes,         HYDROmorphone (DILAUDID) injection 1 mg  1 mg IntraVENous Q2H PRN Keyonna Oakes,         oxyCODONE (ROXICODONE) immediate release tablet 5 mg  5 mg Oral Q4H PRN Keyonna Oakes,         sodium chloride flush 0.9 % injection 5-40 mL  5-40 mL IntraVENous 2 times per day Virgilio King MD   10 mL at 02/10/25 2147    sodium chloride flush 0.9 % injection 5-40 mL  5-40 mL IntraVENous PRN Virgilio King MD        zolpidem (AMBIEN) tablet 5 mg  5 mg Oral Nightly PRN Giuseppe, Sonia A, DO   5 mg at 02/12/25 2233    sodium chloride flush 0.9 % injection 5-40 mL  5-40 mL IntraVENous 2 times per day Giuseppe, Sonia A, DO   10 mL at 02/12/25 0934    sodium chloride flush 0.9 % injection 5-40 mL  5-40 mL IntraVENous PRN Chin, Sonia A, DO        0.9 % sodium chloride infusion   IntraVENous PRN Chin, Sonia A, DO        potassium chloride (KLOR-CON M) extended release tablet 40 mEq  40 mEq Oral PRN Chin, Sonia A, DO        Or    potassium bicarb-citric acid (EFFER-K) effervescent tablet 40 mEq  40 mEq Oral PRN Chin, Sonia A, DO        Or    potassium chloride 10 mEq/100 mL IVPB (Peripheral Line)  10 mEq IntraVENous PRN Chin, Sonia A, DO        magnesium sulfate 2000 mg in 50 mL IVPB premix  2,000 mg IntraVENous PRN Chin, Sonia A, DO        enoxaparin (LOVENOX) injection 40 mg  40 mg SubCUTAneous Daily Sonia Chin DO   40

## 2025-02-13 NOTE — PROGRESS NOTES
GENERAL SURGERY  DAILY PROGRESS NOTE    Patient's Name/Date of Birth: Sallie Gao / 1955    Date: 2025     No chief complaint on file.       Subjective:        Objective:  Last 24Hrs  Temp  Av.1 °F (36.7 °C)  Min: 97.8 °F (36.6 °C)  Max: 98.4 °F (36.9 °C)  Resp  Av.3  Min: 10  Max: 18  Pulse  Av.8  Min: 77  Max: 82  Systolic (24hrs), Av , Min:140 , Max:180     Diastolic (24hrs), Av, Min:70, Max:87    SpO2  Av.1 %  Min: 95 %  Max: 100 %    I/O last 3 completed shifts:  In: 2750 [P.O.:1200; I.V.:1050; Other:500]  Out: 6470 [Urine:6470]      General: resting in bed  Cardiovascular: Warm throughout, no edema  Respiratory: no respiratory distress, equal chest rise on RA  Abdomen: soft, appropriate periincisional tenderness to palpation, nondistended. Incisions C/D/I  Skin: no obvious rashes or lesions appreciated, no jaundice  Extremities: moving all extremities      CBC  Recent Labs     02/10/25  0705 25  0502 25  0751   WBC 10.0 8.1 7.6   RBC 4.60 4.13 4.55   HGB 12.4 11.2* 12.6   HCT 40.6 36.1 39.4   MCV 88.3 87.4 86.6   MCH 27.0 27.1 27.7   MCHC 30.5* 31.0* 32.0   RDW 14.0 14.1 14.3    275 330   MPV 11.3 11.4 11.2       CMP  Recent Labs     02/10/25  0705 25  0502 25  0751    140 134   K 4.3 4.1 3.5   CL 99 104 93*   CO2 23 27 25   BUN 16 13 11   CREATININE 0.6 0.6 0.5   GLUCOSE 173* 119* 120*   CALCIUM 9.1 9.0 9.2         Assessment/Plan:    Patient Active Problem List   Diagnosis    Type 2 diabetes mellitus without complication (HCC)    Primary insomnia    Sleep disorder    Gastroesophageal reflux disease without esophagitis    Lumbar pain with radiation down left leg    Neural foraminal stenosis of lumbar spine    Radiculopathy, lumbosacral region    AMS (altered mental status)    Dizziness    Synovitis and tenosynovitis    Diverticulitis of large intestine with abscess without bleeding    Diverticulitis    Diverticulitis

## 2025-02-13 NOTE — PROGRESS NOTES
[facility] Quality Flow/Interdisciplinary Rounds Progress Note        Quality Flow Rounds held on February 13, 2025    Disciplines Attending:  Bedside Nurse, , , Nursing Unit Leadership, and      Sallie BOYCE Sima was admitted on 2/7/2025 10:06 AM    Anticipated Discharge Date:  Expected Discharge Date: 02/12/25    Disposition: Home with home health     Mitesh Score:  Mitesh Scale Score: 21    [facility] RISK OF UNPLANNED READMISSION 2.0             5.4 Total Score        Discussed patient goal for the day, patient clinical progression, and barriers to discharge.  The following Goal(s) of the Day/Commitment(s) have been identified:  Discharge - Obtain Order. RN call out to Dr. King re: relay patient's request to be discharged today as her  received a call from the transplant list and she needs to be with him. Dr King will be in to round at noon to make the official order and in the mean time would like all bowel regimen ordered given to patient       Kathy Jameson RN  February 13, 2025

## 2025-02-13 NOTE — PLAN OF CARE
Problem: Discharge Planning  Goal: Discharge to home or other facility with appropriate resources  Outcome: Progressing     Problem: Pain  Goal: Verbalizes/displays adequate comfort level or baseline comfort level  Outcome: Progressing     Problem: Chronic Conditions and Co-morbidities  Goal: Patient's chronic conditions and co-morbidity symptoms are monitored and maintained or improved  Outcome: Progressing     Problem: ABCDS Injury Assessment  Goal: Absence of physical injury  Outcome: Progressing     Problem: Safety - Adult  Goal: Free from fall injury  Outcome: Progressing     Problem: Nutrition Deficit:  Goal: Optimize nutritional status  Outcome: Progressing

## 2025-02-13 NOTE — PROGRESS NOTES
Occupational Therapy  Attempted therapy this PM.  Pt receiving nursing care. Will attempt another time.   Blanca TOLEDO/SHERLEY [id number]

## 2025-02-13 NOTE — PLAN OF CARE
Problem: Discharge Planning  Goal: Discharge to home or other facility with appropriate resources  2/13/2025 1210 by Renetta Haynes RN  Outcome: Progressing     Problem: Pain  Goal: Verbalizes/displays adequate comfort level or baseline comfort level  2/13/2025 1210 by Renetta Haynes RN  Outcome: Progressing     Problem: Chronic Conditions and Co-morbidities  Goal: Patient's chronic conditions and co-morbidity symptoms are monitored and maintained or improved  2/13/2025 1210 by Renetta Haynes RN  Outcome: Progressing     Problem: ABCDS Injury Assessment  Goal: Absence of physical injury  2/13/2025 1210 by Renetta Haynes RN  Outcome: Progressing     Problem: Safety - Adult  Goal: Free from fall injury  2/13/2025 1210 by Renetta Haynes RN  Outcome: Progressing     Problem: Nutrition Deficit:  Goal: Optimize nutritional status  2/13/2025 1210 by Renetta Haynes RN  Outcome: Progressing

## 2025-02-14 VITALS
DIASTOLIC BLOOD PRESSURE: 78 MMHG | WEIGHT: 145.5 LBS | HEIGHT: 65 IN | HEART RATE: 65 BPM | OXYGEN SATURATION: 99 % | BODY MASS INDEX: 24.24 KG/M2 | TEMPERATURE: 98.2 F | RESPIRATION RATE: 18 BRPM | SYSTOLIC BLOOD PRESSURE: 146 MMHG

## 2025-02-14 LAB
ANION GAP SERPL CALCULATED.3IONS-SCNC: 12 MMOL/L (ref 7–16)
BASOPHILS # BLD: 0.07 K/UL (ref 0–0.2)
BASOPHILS NFR BLD: 1 % (ref 0–2)
BUN SERPL-MCNC: 12 MG/DL (ref 6–23)
CALCIUM SERPL-MCNC: 9.5 MG/DL (ref 8.6–10.2)
CHLORIDE SERPL-SCNC: 103 MMOL/L (ref 98–107)
CO2 SERPL-SCNC: 21 MMOL/L (ref 22–29)
CREAT SERPL-MCNC: 0.6 MG/DL (ref 0.5–1)
EOSINOPHIL # BLD: 0.47 K/UL (ref 0.05–0.5)
EOSINOPHILS RELATIVE PERCENT: 6 % (ref 0–6)
ERYTHROCYTE [DISTWIDTH] IN BLOOD BY AUTOMATED COUNT: 14.4 % (ref 11.5–15)
GFR, ESTIMATED: >90 ML/MIN/1.73M2
GLUCOSE BLD-MCNC: 110 MG/DL (ref 74–99)
GLUCOSE BLD-MCNC: 153 MG/DL (ref 74–99)
GLUCOSE SERPL-MCNC: 133 MG/DL (ref 74–99)
HCT VFR BLD AUTO: 42.4 % (ref 34–48)
HGB BLD-MCNC: 13.4 G/DL (ref 11.5–15.5)
IMM GRANULOCYTES # BLD AUTO: 0.06 K/UL (ref 0–0.58)
IMM GRANULOCYTES NFR BLD: 1 % (ref 0–5)
LYMPHOCYTES NFR BLD: 2.04 K/UL (ref 1.5–4)
LYMPHOCYTES RELATIVE PERCENT: 28 % (ref 20–42)
MCH RBC QN AUTO: 27.1 PG (ref 26–35)
MCHC RBC AUTO-ENTMCNC: 31.6 G/DL (ref 32–34.5)
MCV RBC AUTO: 85.8 FL (ref 80–99.9)
MONOCYTES NFR BLD: 0.81 K/UL (ref 0.1–0.95)
MONOCYTES NFR BLD: 11 % (ref 2–12)
NEUTROPHILS NFR BLD: 53 % (ref 43–80)
NEUTS SEG NFR BLD: 3.85 K/UL (ref 1.8–7.3)
PLATELET # BLD AUTO: 427 K/UL (ref 130–450)
PMV BLD AUTO: 10.3 FL (ref 7–12)
POTASSIUM SERPL-SCNC: 3.9 MMOL/L (ref 3.5–5)
RBC # BLD AUTO: 4.94 M/UL (ref 3.5–5.5)
SODIUM SERPL-SCNC: 136 MMOL/L (ref 132–146)
WBC OTHER # BLD: 7.3 K/UL (ref 4.5–11.5)

## 2025-02-14 PROCEDURE — 85025 COMPLETE CBC W/AUTO DIFF WBC: CPT

## 2025-02-14 PROCEDURE — 6360000002 HC RX W HCPCS

## 2025-02-14 PROCEDURE — 6370000000 HC RX 637 (ALT 250 FOR IP): Performed by: INTERNAL MEDICINE

## 2025-02-14 PROCEDURE — 6370000000 HC RX 637 (ALT 250 FOR IP)

## 2025-02-14 PROCEDURE — 80048 BASIC METABOLIC PNL TOTAL CA: CPT

## 2025-02-14 PROCEDURE — 97535 SELF CARE MNGMENT TRAINING: CPT

## 2025-02-14 PROCEDURE — 82962 GLUCOSE BLOOD TEST: CPT

## 2025-02-14 PROCEDURE — 2500000003 HC RX 250 WO HCPCS

## 2025-02-14 PROCEDURE — 99231 SBSQ HOSP IP/OBS SF/LOW 25: CPT | Performed by: INTERNAL MEDICINE

## 2025-02-14 RX ORDER — HYDROCHLOROTHIAZIDE 25 MG/1
25 TABLET ORAL DAILY
Status: DISCONTINUED | OUTPATIENT
Start: 2025-02-14 | End: 2025-02-14 | Stop reason: HOSPADM

## 2025-02-14 RX ORDER — HYDROCHLOROTHIAZIDE 25 MG/1
25 TABLET ORAL DAILY
Qty: 30 TABLET | Refills: 3 | Status: SHIPPED | OUTPATIENT
Start: 2025-02-15

## 2025-02-14 RX ORDER — POLYETHYLENE GLYCOL 3350 17 G/17G
17 POWDER, FOR SOLUTION ORAL DAILY
Qty: 30 PACKET | Refills: 0 | Status: SHIPPED | OUTPATIENT
Start: 2025-02-15 | End: 2025-03-17

## 2025-02-14 RX ORDER — METOPROLOL TARTRATE 50 MG
50 TABLET ORAL 2 TIMES DAILY
Qty: 60 TABLET | Refills: 3 | Status: SHIPPED | OUTPATIENT
Start: 2025-02-14

## 2025-02-14 RX ORDER — OXYCODONE HYDROCHLORIDE 5 MG/1
5 TABLET ORAL EVERY 6 HOURS PRN
Qty: 28 TABLET | Refills: 0 | Status: SHIPPED | OUTPATIENT
Start: 2025-02-14 | End: 2025-02-21

## 2025-02-14 RX ADMIN — METOPROLOL TARTRATE 50 MG: 50 TABLET, FILM COATED ORAL at 08:41

## 2025-02-14 RX ADMIN — POLYETHYLENE GLYCOL 3350 17 G: 17 POWDER, FOR SOLUTION ORAL at 08:41

## 2025-02-14 RX ADMIN — SODIUM CHLORIDE, PRESERVATIVE FREE 10 ML: 5 INJECTION INTRAVENOUS at 08:42

## 2025-02-14 RX ADMIN — ENOXAPARIN SODIUM 40 MG: 100 INJECTION SUBCUTANEOUS at 08:41

## 2025-02-14 RX ADMIN — HYDROCHLOROTHIAZIDE 25 MG: 25 TABLET ORAL at 08:41

## 2025-02-14 NOTE — PROGRESS NOTES
[facility] Quality Flow/Interdisciplinary Rounds Progress Note        Quality Flow Rounds held on February 14, 2025    Disciplines Attending:  Bedside Nurse, , , and Nursing Unit Leadership    Sallie Gao was admitted on 2/7/2025 10:06 AM    Anticipated Discharge Date:  Expected Discharge Date: 02/12/25    Disposition:    Mitesh Score:  Mitesh Scale Score: 22    [facility] RISK OF UNPLANNED READMISSION 2.0             5.4 Total Score        Discussed patient goal for the day, patient clinical progression, and barriers to discharge.  The following Goal(s) of the Day/Commitment(s) have been identified:   diet advanced, discharge planning      Darius Cuevas, YULY  February 14, 2025

## 2025-02-14 NOTE — PROGRESS NOTES
Progress  Note  No chief complaint on file.    Historical Issues:  Current Facility-Administered Medications   Medication Dose Route Frequency Provider Last Rate Last Admin    hydroCHLOROthiazide (HYDRODIURIL) tablet 25 mg  25 mg Oral Daily Ayad Humphrey MD   25 mg at 02/14/25 0841    metoprolol tartrate (LOPRESSOR) tablet 50 mg  50 mg Oral BID Ayad Humphrey MD   50 mg at 02/14/25 0841    polyethylene glycol (GLYCOLAX) packet 17 g  17 g Oral Daily Keyonna Oakes, DO   17 g at 02/14/25 0841    acetaminophen (TYLENOL) tablet 650 mg  650 mg Oral 4 times per day Ayad Humphrey MD   650 mg at 02/13/25 1637    oxyCODONE (ROXICODONE) immediate release tablet 5 mg  5 mg Oral Q4H PRN Keyonna Oakes DO        sodium chloride flush 0.9 % injection 5-40 mL  5-40 mL IntraVENous 2 times per day Virgilio King MD   10 mL at 02/10/25 2147    sodium chloride flush 0.9 % injection 5-40 mL  5-40 mL IntraVENous PRN Virgilio King MD        zolpidem (AMBIEN) tablet 5 mg  5 mg Oral Nightly PRN Catalina Chinily A, DO   5 mg at 02/13/25 2205    sodium chloride flush 0.9 % injection 5-40 mL  5-40 mL IntraVENous 2 times per day Catalina Chinily A, DO   10 mL at 02/14/25 0842    sodium chloride flush 0.9 % injection 5-40 mL  5-40 mL IntraVENous PRN Catalina Chinily A, DO        0.9 % sodium chloride infusion   IntraVENous PRN Giuseppe Sonia A, DO        potassium chloride (KLOR-CON M) extended release tablet 40 mEq  40 mEq Oral PRN Giuseppe Sonia A, DO        Or    potassium bicarb-citric acid (EFFER-K) effervescent tablet 40 mEq  40 mEq Oral PRN Giuseppe, Sonia A, DO        Or    potassium chloride 10 mEq/100 mL IVPB (Peripheral Line)  10 mEq IntraVENous PRN Giuseppe Sonia A, DO        magnesium sulfate 2000 mg in 50 mL IVPB premix  2,000 mg IntraVENous PRN Sonia Chin A, DO        enoxaparin (LOVENOX) injection 40 mg  40 mg SubCUTAneous Daily Sonia Chin DO   40 mg at 02/14/25 0841    ondansetron (ZOFRAN-ODT) disintegrating tablet 4 mg

## 2025-02-14 NOTE — PROGRESS NOTES
GENERAL SURGERY  DAILY PROGRESS NOTE  2/14/2025    CHIEF COMPLAINT:  Diverticular disease    SUBJECTIVE:  Feeling well this morning, her nausea has resolved.  She did have 2 bowel movements yesterday that were loose but near normal.  She is feeling better than yesterday.    OBJECTIVE:  BP (!) 146/78   Pulse 65   Temp 98.2 °F (36.8 °C) (Oral)   Resp 18   Ht 1.651 m (5' 5\")   Wt 66 kg (145 lb 8 oz)   SpO2 99%   BMI 24.21 kg/m²     GENERAL:  NAD. A&Ox3.  HEENT: Normocephalic  LUNGS: On room air. No acute respiratory distress  CARDIOVASCULAR: hemodynamically stable  SKIN: Warm and dry, surgical incisions well-healing with skin glue in place  ABDOMEN:  Soft, non-distended, appropriately tender. No guarding, rigidity, rebound.  EXT: ROM intact all 4 extremities, no obvious deformities    ASSESSMENT/PLAN:  69 y.o. female with diverticular disease status post left-sided partial colectomy with primary anastomosis bladder repair    Plan  -Continue full liquid diet  -As needed pain and nausea control Oxy and Dilaudid  -Sliding scale insulin goal glucose less than 180  -Continue Kearney catheter appreciate urology input  -Lovenox and PCD's for DVT prophylax  -Medicine following for hypertension appreciate input  -Up and out of bed  -Dispo planning    Will DW Dr. Fernando Swift MD  Surgery Resident PGY-4  2/14/2025  8:38 AM     General Surgery attending addendum:    Agree with assessment and plan as stated below the following exceptions  Patient seen evaluated this morning doing much better since having 2 bowel movements overnight.  Says nausea and vomiting have completely resolved and tolerating p.o. intake well now.  Notes bloating and distention also improved.  Denies any acute complaints.  Labs reviewed.  Physical exam demonstrating soft, nondistended, appropriately tender abdomen with incisions clean dry and intact.  Patient will have diet advanced to regular diabetic diet.  Resume home medication.

## 2025-02-14 NOTE — PLAN OF CARE
Problem: Discharge Planning  Goal: Discharge to home or other facility with appropriate resources  2/14/2025 0144 by Zabrina Barker RN  Outcome: Progressing  2/13/2025 1210 by Renetta Haynes RN  Outcome: Progressing     Problem: Pain  Goal: Verbalizes/displays adequate comfort level or baseline comfort level  2/14/2025 0144 by Zabrina Barker RN  Outcome: Progressing  2/13/2025 1210 by Renetta Haynes RN  Outcome: Progressing     Problem: Chronic Conditions and Co-morbidities  Goal: Patient's chronic conditions and co-morbidity symptoms are monitored and maintained or improved  2/14/2025 0144 by Zabrina Barker RN  Outcome: Progressing  2/13/2025 1210 by Renetta Haynes RN  Outcome: Progressing     Problem: ABCDS Injury Assessment  Goal: Absence of physical injury  2/14/2025 0144 by Zabrina Barker RN  Outcome: Progressing  2/13/2025 1210 by Renetta Haynes RN  Outcome: Progressing     Problem: Safety - Adult  Goal: Free from fall injury  2/14/2025 0144 by Zabrina Barker RN  Outcome: Progressing  2/13/2025 1210 by Renetta Haynes RN  Outcome: Progressing     Problem: Nutrition Deficit:  Goal: Optimize nutritional status  2/14/2025 0144 by Zabrina Barker RN  Outcome: Progressing  2/13/2025 1210 by Renetta Haynes RN  Outcome: Progressing

## 2025-02-14 NOTE — PROGRESS NOTES
Occupational Therapy  OT BEDSIDE TREATMENT NOTE      Date:2025  Patient Name: Sallie Gao  MRN: [id number]  : 1955  Room: [address] [place], [place] [address]       Evaluating OT: Georgie Hogan OTR/L - [id number]     Referring Provider: Sonia Chin DO   Specific Provider Orders/Date: \"OT eval and treat\" - 2025     Diagnosis: Diverticulitis of colon with perforation [K57.20]  Perforated diverticulum of large intestine [K57.20]    Surgery: Patient underwent laparoscopic partial colectomy with bladder repair on 2025.  Pertinent Medical History: arthritis, SHANTANU, DM, back surgery      Precautions: fall risk     Assessment of Current Deficits:    [x] Functional mobility             [x]ADLs           [x] Strength                  []Cognition   [x] Functional transfers           [x] IADLs         [x] Safety Awareness   [x]Endurance   [] Fine Coordination              [x] Balance      [] Vision/perception   []Sensation     []Gross Motor Coordination  [] ROM           [] Delirium                   [] Motor Control      OT PLAN OF CARE   OT POC is based on physician orders, patient diagnosis, and results of clinical assessment.  Frequency/Duration 2-5 days/week for 2 weeks PRN   Specific OT Treatment Interventions to Include:   * Instruction/training on adapted ADL techniques and AE recommendations to increase functional independence within precautions       * Training on energy conservation strategies, correct breathing pattern and techniques to improve independence/tolerance for self-care routine  * Functional transfer/mobility training/DME recommendations for increased independence, safety, and fall prevention  * Patient/Family education to increase follow through with safety techniques and functional independence  * Recommendation of environmental modifications for increased safety with functional transfers/mobility and ADLs  * Therapeutic exercise to improve motor endurance, ROM, and functional strength for

## 2025-02-14 NOTE — PLAN OF CARE
Problem: Discharge Planning  Goal: Discharge to home or other facility with appropriate resources  Outcome: Completed     Problem: Pain  Goal: Verbalizes/displays adequate comfort level or baseline comfort level  Outcome: Completed     Problem: Chronic Conditions and Co-morbidities  Goal: Patient's chronic conditions and co-morbidity symptoms are monitored and maintained or improved  Outcome: Completed     Problem: ABCDS Injury Assessment  Goal: Absence of physical injury  Outcome: Completed     Problem: Safety - Adult  Goal: Free from fall injury  Outcome: Completed     Problem: Nutrition Deficit:  Goal: Optimize nutritional status  Outcome: Completed

## 2025-04-04 ENCOUNTER — OFFICE VISIT (OUTPATIENT)
Dept: PODIATRY | Age: 70
End: 2025-04-04
Payer: MEDICARE

## 2025-04-04 VITALS
BODY MASS INDEX: 24.13 KG/M2 | SYSTOLIC BLOOD PRESSURE: 132 MMHG | TEMPERATURE: 98.1 F | DIASTOLIC BLOOD PRESSURE: 78 MMHG | WEIGHT: 145 LBS

## 2025-04-04 DIAGNOSIS — M19.071 ARTHRITIS OF MIDTARSAL JOINT OF RIGHT FOOT: ICD-10-CM

## 2025-04-04 DIAGNOSIS — M65.971 TENOSYNOVITIS OF RIGHT FOOT: ICD-10-CM

## 2025-04-04 DIAGNOSIS — E11.9 TYPE 2 DIABETES MELLITUS WITHOUT COMPLICATION, WITHOUT LONG-TERM CURRENT USE OF INSULIN: Primary | ICD-10-CM

## 2025-04-04 PROCEDURE — G8428 CUR MEDS NOT DOCUMENT: HCPCS | Performed by: PODIATRIST

## 2025-04-04 PROCEDURE — 1123F ACP DISCUSS/DSCN MKR DOCD: CPT | Performed by: PODIATRIST

## 2025-04-04 PROCEDURE — 3017F COLORECTAL CA SCREEN DOC REV: CPT | Performed by: PODIATRIST

## 2025-04-04 PROCEDURE — 99213 OFFICE O/P EST LOW 20 MIN: CPT | Performed by: PODIATRIST

## 2025-04-04 PROCEDURE — G8400 PT W/DXA NO RESULTS DOC: HCPCS | Performed by: PODIATRIST

## 2025-04-04 PROCEDURE — 1090F PRES/ABSN URINE INCON ASSESS: CPT | Performed by: PODIATRIST

## 2025-04-04 PROCEDURE — 2022F DILAT RTA XM EVC RTNOPTHY: CPT | Performed by: PODIATRIST

## 2025-04-04 PROCEDURE — 3046F HEMOGLOBIN A1C LEVEL >9.0%: CPT | Performed by: PODIATRIST

## 2025-04-04 PROCEDURE — 1036F TOBACCO NON-USER: CPT | Performed by: PODIATRIST

## 2025-04-04 PROCEDURE — G8420 CALC BMI NORM PARAMETERS: HCPCS | Performed by: PODIATRIST

## 2025-04-04 NOTE — PROGRESS NOTES
will call for follow-up going forward.      No follow-ups on file.      Seen By:  Sai Hudson DPM      Document was created using voice recognition software.  Note was reviewed, however may contain grammatical errors.

## (undated) DEVICE — INSUFFLATION NEEDLE TO ESTABLISH PNEUMOPERITONEUM.: Brand: INSUFFLATION NEEDLE

## (undated) DEVICE — 3 ML SYRINGE LUER-LOCK TIP: Brand: MONOJECT

## (undated) DEVICE — LAPAROSCOPIC SCISSORS: Brand: EPIX LAPAROSCOPIC SCISSORS

## (undated) DEVICE — PAD,EYE,1-5/8X2 5/8,STERILE,LF,1/PK: Brand: MEDLINE

## (undated) DEVICE — BLADE,STAINLESS-STEEL,11,STRL,DISPOSABLE: Brand: MEDLINE

## (undated) DEVICE — MARKER,SKIN,WI/RULER AND LABELS: Brand: MEDLINE

## (undated) DEVICE — LENS VITRCTMY OCU FLAT DISP

## (undated) DEVICE — CANNULA INJ 25GA SFT TIP DISP

## (undated) DEVICE — STAPLER INT L34CM 60MM LNG ENDOSCP ARTC PWR + ECHELON FLX

## (undated) DEVICE — Device

## (undated) DEVICE — NON-DEHP CATHETER EXTENSION SET, MALE LUER LOCK ADAPTER

## (undated) DEVICE — BANDAGE ADH W0.75XL3IN UNIV WVN FAB NAT GEN USE STRP N ADH

## (undated) DEVICE — GAUZE,SPONGE,4"X4",12PLY,STERILE,LF,2'S: Brand: MEDLINE

## (undated) DEVICE — SHIELD EYE W3XL2.5IN UNIV CLR PLAS LTWT

## (undated) DEVICE — 3M™ TEGADERM™ TRANSPARENT FILM DRESSING FRAME STYLE, 1626W, 4 IN X 4-3/4 IN (10 CM X 12 CM), 50/CT 4CT/CASE: Brand: 3M™ TEGADERM™

## (undated) DEVICE — BANDAGE ADH W1XL3IN NAT FAB WVN FLX DURABLE N ADH PD SEAL

## (undated) DEVICE — GOWN,SIRUS,POLYRNF,BRTHSLV,XLN/XL,20/CS: Brand: MEDLINE

## (undated) DEVICE — CATHETER F BLLN 5CC 22FR 2 W HYDRGEL COAT LESS TRAUM LUB

## (undated) DEVICE — 6 ML SYRINGE LUER-LOCK TIP: Brand: MONOJECT

## (undated) DEVICE — DRAPE,REIN 53X77,STERILE: Brand: MEDLINE

## (undated) DEVICE — BIOM OPTIC SET DISPOSABLE, WITH BIOM HD FLEX LENS FOR F=175 MM OR F=200 MM: Brand: BIOM OPTIC SET DISPOSABLE, WITH BIOM HD FLEX LENS FOR F=175 MM OR F=200 MM

## (undated) DEVICE — TAPE ADH W1INXL10YD WHT PAPR GENTLE BRTH FLX COMFORTABLE

## (undated) DEVICE — APPLICATOR MEDICATED 26 CC SOLUTION HI LT ORNG CHLORAPREP

## (undated) DEVICE — APPLICATOR SURG XL L38CM FOR ARISTA ABSRB HEMSTAT FLEXITIP

## (undated) DEVICE — RELOAD STPL L60MM H1.5-3.6MM REG TISS BLU GRIPPING SURF B

## (undated) DEVICE — AGENT HEMSTAT 5GM ARISTA AH

## (undated) DEVICE — TOWEL OR BLUEE 16X26IN ST 8 PACK ORB08 16X26ORTWL

## (undated) DEVICE — DRAPE MICSCP FULL FLD STRL OCULUS ZEISS

## (undated) DEVICE — SYRINGE,TOOMEY,IRRIGATION,70CC,STERILE: Brand: MEDLINE

## (undated) DEVICE — ENCORE® LATEX TEXTURED SIZE 6.5, STERILE LATEX POWDER-FREE SURGICAL GLOVE: Brand: ENCORE

## (undated) DEVICE — NEEDLE HYPO 25GA L1.5IN BLU POLYPR HUB S STL REG BVL STR

## (undated) DEVICE — TOWEL,OR,DSP,ST,BLUE,STD,6/PK,12PK/CS: Brand: MEDLINE

## (undated) DEVICE — Z DISCONTINUED APPLICATOR SURG PREP 0.35OZ 2% CHG 70% ISO ALC W/ HI LT

## (undated) DEVICE — DRAPE,CHEST,FENES,15X10,STERIL: Brand: MEDLINE

## (undated) DEVICE — NEEDLE FLTR 18GA L1.5IN MEM THK5UM BLNT DISP

## (undated) DEVICE — SYRINGE MED 5ML STD CLR PLAS LUERLOCK TIP N CTRL DISP

## (undated) DEVICE — BLADE,STAINLESS-STEEL,10,STRL,DISPOSABLE: Brand: MEDLINE

## (undated) DEVICE — 3M™ RED DOT™ MONITORING ELECTRODE WITH FOAM TAPE AND STICKY GEL 2560, 50/BAG, 20/CASE, 72/PLT: Brand: RED DOT™

## (undated) DEVICE — RELOAD STPL H4.1X2MM DIA60MM THCK TISS GRN 6 ROW PWR GST B

## (undated) DEVICE — ELECTRODE PT RET AD L9FT HI MOIST COND ADH HYDRGEL CORDED

## (undated) DEVICE — STAPLER INT DIA25MM CLS STPL H1.5-2.2MM OPN LEG L5.2MM 22

## (undated) DEVICE — WHISTLE TIP URETERAL CATHETER (RIGHT): Brand: COOK

## (undated) DEVICE — GOWN,SIRUS,FABRNF,XL,20/CS: Brand: MEDLINE

## (undated) DEVICE — PACK SURGICAL PROCEDURE 25 GAUGE/20PK

## (undated) DEVICE — NEEDLE HYPO 18GA L1.5IN PNK POLYPR HUB S STL THN WALL FILL

## (undated) DEVICE — GAUZE,SPONGE,4"X4",16PLY,XRAY,STRL,LF: Brand: MEDLINE

## (undated) DEVICE — SYRINGE 20ML LL S/C 50

## (undated) DEVICE — TROCAR: Brand: KII SHIELDED BLADED ACCESS SYSTEM

## (undated) DEVICE — 1LYRTR 16FR10ML100%SIL UMS SNP: Brand: MEDLINE INDUSTRIES, INC.

## (undated) DEVICE — YANKAUER,POOLE TIP,STERILE,50/CS: Brand: MEDLINE

## (undated) DEVICE — SEALER ENDOSCP L37CM NANO COAT BLNT TIP LAP DIV

## (undated) DEVICE — INSUFFLATION TUBING SET WITH FILTER, FUNNEL CONNECTOR AND LUER LOCK: Brand: JOSNOE MEDICAL INC

## (undated) DEVICE — 1 ML TUBERCULIN SYRINGE,DETACHABLE NEEDLE: Brand: MONOJECT

## (undated) DEVICE — LEGGINGS, PAIR, 31X48, STERILE: Brand: MEDLINE

## (undated) DEVICE — LIQUIBAND RAPID ADHESIVE 36/CS 0.8ML: Brand: MEDLINE

## (undated) DEVICE — TROCAR: Brand: KII® SLEEVE

## (undated) DEVICE — PACK PROCEDURE SURG RETINAL ST ES CUST

## (undated) DEVICE — ACCESS PLATFORM FOR MINIMALLY INVASIVE SURGERY.: Brand: GELPORT® LAPAROSCOPIC  SYSTEM

## (undated) DEVICE — GLOVE ORANGE PI 7 1/2   MSG9075

## (undated) DEVICE — TAPE ADH W2INXL10YD PLAS TRNSPAR H2O RESIST HYPOALRG CURAD

## (undated) DEVICE — PUMP SUC IRR TBNG L10FT W/ HNDPC ASSEMB STRYKEFLOW 2

## (undated) DEVICE — POUCH, INSTRUMENT, 3POCKET, INVISISHIELD: Brand: MEDLINE

## (undated) DEVICE — GLOVE ORANGE PI 8   MSG9080

## (undated) DEVICE — 25+® REVOLUTION DSP ILM FORCEPS: Brand: ALCON GRIESHABER REVOLUTION 25+

## (undated) DEVICE — SKIN PREP TRAY 4 COMPARTM TRAY: Brand: MEDLINE INDUSTRIES, INC.

## (undated) DEVICE — GLOVE ORANGE PI 7   MSG9070

## (undated) DEVICE — NEEDLE SPNL 22GA L5IN BLK HUB S STL W/ QNCKE PNT W/OUT

## (undated) DEVICE — SYRINGE, LUER LOCK, 10ML: Brand: MEDLINE

## (undated) DEVICE — SYRINGE IRRIG 60ML SFT PLIABLE BLB EZ TO GRP 1 HND USE W/

## (undated) DEVICE — SOLUTION IRRIG 1000ML 0.9% SOD CHL USP POUR PLAS BTL

## (undated) DEVICE — NEEDLE HYPO 23GA L1.5IN TURQ POLYPR HUB S STL THN WALL IM

## (undated) DEVICE — DRAINBAG,ANTI-REFLUX TOWER,L/F,2000ML,LL: Brand: MEDLINE

## (undated) DEVICE — KIT,ANTI FOG,W/SPONGE & FLUID,SOFT PACK: Brand: MEDLINE

## (undated) DEVICE — MEDICINE CUP, GRADUATED, STER: Brand: MEDLINE

## (undated) DEVICE — SOLUTION IRRIG 1000ML STRL H2O USP PLAS POUR BTL

## (undated) DEVICE — GLOVE SURG SZ 7 CRM LTX FREE POLYISOPRENE POLYMER BEAD ANTI

## (undated) DEVICE — PAD PT POS 36 IN SURGYPAD DISP

## (undated) DEVICE — DOUBLE BASIN SET: Brand: MEDLINE INDUSTRIES, INC.

## (undated) DEVICE — CYSTO PACK: Brand: MEDLINE INDUSTRIES, INC.

## (undated) DEVICE — TROCAR: Brand: KII FIOS FIRST ENTRY

## (undated) DEVICE — STERILE POLYISOPRENE POWDER-FREE SURGICAL GLOVES: Brand: PROTEXIS

## (undated) DEVICE — GOWN,SIRUS,FABRNF,L,20/CS: Brand: MEDLINE

## (undated) DEVICE — SPONGE LAP W18XL18IN WHT COT 4 PLY FLD STRUNG RADPQ DISP ST 2 PER PACK

## (undated) DEVICE — SOLUTION IRRIG 3000ML 0.9% SOD CHL USP UROMATIC PLAS CONT